# Patient Record
Sex: MALE | Race: WHITE | NOT HISPANIC OR LATINO | URBAN - METROPOLITAN AREA
[De-identification: names, ages, dates, MRNs, and addresses within clinical notes are randomized per-mention and may not be internally consistent; named-entity substitution may affect disease eponyms.]

---

## 2016-01-27 RX ORDER — LOSARTAN POTASSIUM 100 MG/1
2 TABLET, FILM COATED ORAL
Qty: 0 | Refills: 0 | COMMUNITY
Start: 2016-01-27

## 2017-01-12 ENCOUNTER — EMERGENCY (EMERGENCY)
Facility: HOSPITAL | Age: 67
LOS: 1 days | Discharge: HOME | End: 2017-01-12
Attending: EMERGENCY MEDICINE | Admitting: EMERGENCY MEDICINE
Payer: COMMERCIAL

## 2017-01-12 VITALS
HEIGHT: 63 IN | DIASTOLIC BLOOD PRESSURE: 79 MMHG | SYSTOLIC BLOOD PRESSURE: 178 MMHG | TEMPERATURE: 98 F | HEART RATE: 74 BPM | OXYGEN SATURATION: 99 % | RESPIRATION RATE: 15 BRPM | WEIGHT: 190.04 LBS
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
## 2017-01-12 DIAGNOSIS — S61.214A LACERATION WITHOUT FOREIGN BODY OF RIGHT RING FINGER WITHOUT DAMAGE TO NAIL, INITIAL ENCOUNTER: ICD-10-CM

## 2017-01-12 DIAGNOSIS — Z79.02 LONG TERM (CURRENT) USE OF ANTITHROMBOTICS/ANTIPLATELETS: ICD-10-CM

## 2017-01-12 DIAGNOSIS — I25.10 ATHEROSCLEROTIC HEART DISEASE OF NATIVE CORONARY ARTERY WITHOUT ANGINA PECTORIS: ICD-10-CM

## 2017-01-12 DIAGNOSIS — I10 ESSENTIAL (PRIMARY) HYPERTENSION: ICD-10-CM

## 2017-01-12 DIAGNOSIS — E78.5 HYPERLIPIDEMIA, UNSPECIFIED: ICD-10-CM

## 2017-01-12 DIAGNOSIS — Z79.82 LONG TERM (CURRENT) USE OF ASPIRIN: ICD-10-CM
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
## 2017-01-12 PROCEDURE — 12001 RPR S/N/AX/GEN/TRNK 2.5CM/<: CPT

## 2017-01-12 PROCEDURE — 90715 TDAP VACCINE 7 YRS/> IM: CPT

## 2017-01-12 PROCEDURE — 99283 EMERGENCY DEPT VISIT LOW MDM: CPT | Mod: 25

## 2017-01-12 PROCEDURE — 90471 IMMUNIZATION ADMIN: CPT

## 2017-01-12 RX ORDER — TETANUS TOXOID, REDUCED DIPHTHERIA TOXOID AND ACELLULAR PERTUSSIS VACCINE, ADSORBED 5; 2.5; 8; 8; 2.5 [IU]/.5ML; [IU]/.5ML; UG/.5ML; UG/.5ML; UG/.5ML
0.5 SUSPENSION INTRAMUSCULAR ONCE
Qty: 0 | Refills: 0 | Status: COMPLETED | OUTPATIENT
Start: 2017-01-12 | End: 2017-01-12

## 2017-01-12 RX ADMIN — TETANUS TOXOID, REDUCED DIPHTHERIA TOXOID AND ACELLULAR PERTUSSIS VACCINE, ADSORBED 0.5 MILLILITER(S): 5; 2.5; 8; 8; 2.5 SUSPENSION INTRAMUSCULAR at 22:28

## 2017-01-12 NOTE — ED PROVIDER NOTE - OBJECTIVE STATEMENT
66 male presents to [facility] while at work today at 10am he cut his right 4th digit with a razor blade, noted bleeding, cleaned it out, co-worker applied bandages to control the bleeding.

## 2017-01-25 ENCOUNTER — EMERGENCY (EMERGENCY)
Facility: HOSPITAL | Age: 67
LOS: 1 days | Discharge: HOME | End: 2017-01-25
Attending: EMERGENCY MEDICINE | Admitting: EMERGENCY MEDICINE
Payer: COMMERCIAL

## 2017-01-25 VITALS
SYSTOLIC BLOOD PRESSURE: 148 MMHG | TEMPERATURE: 98 F | DIASTOLIC BLOOD PRESSURE: 82 MMHG | HEART RATE: 55 BPM | RESPIRATION RATE: 12 BRPM | WEIGHT: 179.9 LBS | OXYGEN SATURATION: 99 %

## 2017-01-25 DIAGNOSIS — Z79.02 LONG TERM (CURRENT) USE OF ANTITHROMBOTICS/ANTIPLATELETS: ICD-10-CM

## 2017-01-25 DIAGNOSIS — I10 ESSENTIAL (PRIMARY) HYPERTENSION: ICD-10-CM

## 2017-01-25 DIAGNOSIS — Z48.02 ENCOUNTER FOR REMOVAL OF SUTURES: ICD-10-CM

## 2017-01-25 DIAGNOSIS — Z79.82 LONG TERM (CURRENT) USE OF ASPIRIN: ICD-10-CM

## 2017-01-25 DIAGNOSIS — E78.5 HYPERLIPIDEMIA, UNSPECIFIED: ICD-10-CM

## 2017-01-25 DIAGNOSIS — I25.10 ATHEROSCLEROTIC HEART DISEASE OF NATIVE CORONARY ARTERY WITHOUT ANGINA PECTORIS: ICD-10-CM

## 2017-01-25 PROCEDURE — G0463: CPT

## 2017-01-25 NOTE — ED PROCEDURE NOTE - CPROC ED POST PROC CARE GUIDE1
Keep the cast/splint/dressing clean and dry./Verbal/written post procedure instructions were given to patient/caregiver./Instructed patient/caregiver regarding signs and symptoms of infection./Instructed patient/caregiver to follow-up with primary care physician.

## 2017-01-25 NOTE — ED PROVIDER NOTE - PHYSICAL EXAMINATION
R 4th finger:  2.5cm well approximated laceration with sutures in place.  No erythema, TTP, d/c, or other signs of infection.  FROM, motor 5/5, sensation intact, CR<2secs.

## 2017-09-11 NOTE — ED PROVIDER NOTE - CONTEXT
Subjective   HPI Comments: 54-year-old white male with known history of rectal cancer followed by Dr. Benjamin complaining of abdominal pain , vomiting and no bowel movement for 2 days.  He notified his doctor but he was told to come to the emergency department.  He denies fever, chills, urinary complaints, or other complaints.    Patient is a 54 y.o. male presenting with abdominal pain.   History provided by:  Patient  Abdominal Pain   Pain location:  Generalized  Pain quality: dull    Pain radiates to:  Does not radiate  Pain severity:  Moderate  Onset quality:  Gradual  Timing:  Constant  Progression:  Worsening  Chronicity:  Recurrent  Relieved by:  Nothing  Worsened by:  Nothing  Ineffective treatments:  None tried  Associated symptoms: no anorexia, no cough, no fever, no hematemesis, no hematochezia, no hematuria, no nausea and no shortness of breath        Review of Systems   Constitutional: Negative for fever.   Respiratory: Negative for cough and shortness of breath.    Gastrointestinal: Positive for abdominal pain. Negative for anorexia, hematemesis, hematochezia and nausea.   Genitourinary: Negative for hematuria.   All other systems reviewed and are negative.      Past Medical History:   Diagnosis Date   • Degenerative disc disease, lumbar    • Rectal cancer        Allergies   Allergen Reactions   • Codeine Itching       Past Surgical History:   Procedure Laterality Date   • COLONOSCOPY  2016   • HEMORROIDECTOMY  2014   • LUMBAR DISC SURGERY  2006    L2-L5   • PILONIDAL CYSTECTOMY  2015   • UMBILICAL HERNIA REPAIR  2014   • UPPER GASTROINTESTINAL ENDOSCOPY  2016       Family History   Problem Relation Age of Onset   • Stroke Father    • Throat cancer Father        Social History     Social History   • Marital status: Single     Spouse name: N/A   • Number of children: 0   • Years of education: H.S.     Occupational History   • [email or web address]     Social History Main Topics   • Smoking status: Former  Smoker     Packs/day: 1.00     Years: 20.00     Types: Cigarettes     Quit date: 6/25/2015   • Smokeless tobacco: Never Used   • Alcohol use No   • Drug use: No   • Sexual activity: Yes     Partners: Male     Other Topics Concern   • None     Social History Narrative   • None           Objective   Physical Exam   Constitutional: He appears well-developed and well-nourished.   HENT:   Head: Normocephalic and atraumatic.   Eyes: Conjunctivae are normal.   Neck: Normal range of motion. Neck supple.   Cardiovascular: Normal rate, regular rhythm and normal heart sounds.    Pulmonary/Chest: Effort normal and breath sounds normal.   Abdominal: Soft. There is tenderness. There is guarding.   Patient with generalized tenderness and absence of bowel sounds.  Patient has guarding in the mid abdomen.   Musculoskeletal: Normal range of motion.   Neurological: He is alert.   Skin: Skin is warm and dry.   Psychiatric: He has a normal mood and affect. His behavior is normal. Judgment and thought content normal.   Nursing note and vitals reviewed.      Procedures         ED Course  ED Course   Comment By Time   I spoke with Dr. Benjamin by phone.  H&P and results were given.  He recommended the patient calling the office for further instructions. JOSEPH Ontiveros 09/11 1510          Recent Results (from the past 24 hour(s))   Comprehensive Metabolic Panel    Collection Time: 09/11/17 11:22 AM   Result Value Ref Range    Glucose 80 70 - 100 mg/dL    BUN 10 9 - 23 mg/dL    Creatinine 1.00 0.60 - 1.30 mg/dL    Sodium 144 132 - 146 mmol/L    Potassium 4.2 3.5 - 5.5 mmol/L    Chloride 109 99 - 109 mmol/L    CO2 30.0 20.0 - 31.0 mmol/L    Calcium 9.7 8.7 - 10.4 mg/dL    Total Protein 7.3 5.7 - 8.2 g/dL    Albumin 4.60 3.20 - 4.80 g/dL    ALT (SGPT) 10 7 - 40 U/L    AST (SGOT) 13 0 - 33 U/L    Alkaline Phosphatase 90 25 - 100 U/L    Total Bilirubin 0.3 0.3 - 1.2 mg/dL    eGFR Non African Amer 78 >60 mL/min/1.73    Globulin 2.7 gm/dL    A/G  Ratio 1.7 1.5 - 2.5 g/dL    BUN/Creatinine Ratio 10.0 7.0 - 25.0    Anion Gap 5.0 3.0 - 11.0 mmol/L   Amylase    Collection Time: 09/11/17 11:22 AM   Result Value Ref Range    Amylase 66 30 - 118 U/L   Lipase    Collection Time: 09/11/17 11:22 AM   Result Value Ref Range    Lipase 118 (H) 6 - 51 U/L   Lactic Acid, Plasma    Collection Time: 09/11/17 11:22 AM   Result Value Ref Range    Lactate 0.6 0.5 - 2.0 mmol/L   CBC Auto Differential    Collection Time: 09/11/17 11:22 AM   Result Value Ref Range    WBC 4.90 3.50 - 10.80 10*3/mm3    RBC 4.37 4.20 - 5.76 10*6/mm3    Hemoglobin 13.6 13.1 - 17.5 g/dL    Hematocrit 38.9 38.9 - 50.9 %    MCV 89.0 80.0 - 99.0 fL    MCH 31.1 (H) 27.0 - 31.0 pg    MCHC 35.0 32.0 - 36.0 g/dL    RDW 13.2 11.3 - 14.5 %    RDW-SD 43.0 37.0 - 54.0 fl    MPV 10.3 6.0 - 12.0 fL    Platelets 161 150 - 450 10*3/mm3    Neutrophil % 62.9 41.0 - 71.0 %    Lymphocyte % 25.7 24.0 - 44.0 %    Monocyte % 9.2 0.0 - 12.0 %    Eosinophil % 1.4 0.0 - 3.0 %    Basophil % 0.6 0.0 - 1.0 %    Immature Grans % 0.2 0.0 - 0.6 %    Neutrophils, Absolute 3.08 1.50 - 8.30 10*3/mm3    Lymphocytes, Absolute 1.26 0.60 - 4.80 10*3/mm3    Monocytes, Absolute 0.45 0.00 - 1.00 10*3/mm3    Eosinophils, Absolute 0.07 0.00 - 0.30 10*3/mm3    Basophils, Absolute 0.03 0.00 - 0.20 10*3/mm3    Immature Grans, Absolute 0.01 0.00 - 0.03 10*3/mm3   Urinalysis With / Culture If Indicated    Collection Time: 09/11/17 11:32 AM   Result Value Ref Range    Color, UA Yellow Yellow, Straw    Appearance, UA Clear Clear    pH, UA 6.0 5.0 - 8.0    Specific Gravity, UA 1.006 1.001 - 1.030    Glucose, UA Negative Negative    Ketones, UA Negative Negative    Bilirubin, UA Negative Negative    Blood, UA Moderate (2+) (A) Negative    Protein, UA Negative Negative    Leuk Esterase, UA Negative Negative    Nitrite, UA Negative Negative    Urobilinogen, UA 0.2 E.U./dL 0.2 - 1.0 E.U./dL   Urinalysis, Microscopic Only    Collection Time: 09/11/17 11:32  AM   Result Value Ref Range    RBC, UA 3-6 (A) None Seen, 0-2 /HPF    WBC, UA None Seen None Seen /HPF    Bacteria, UA None Seen None Seen, Trace /HPF    Squamous Epithelial Cells, UA None Seen None Seen, 0-2 /HPF    Hyaline Casts, UA None Seen 0 - 6 /LPF    Methodology Automated Microscopy    POCT Occult Blood, stool    Collection Time: 09/11/17 12:10 PM   Result Value Ref Range    Fecal Occult Blood Negative Negative    Lot Number [id number]     Expiration Date 3-20     DEVELOPER LOT NUMBER [id number]     DEVELOPER EXPIRATION DATE 5-20     Positive Control Positive Positive    Negative Control Negative Negative     Note: In addition to lab results from this visit, the labs listed above may include labs taken at another facility or during a different encounter within the last 24 hours. Please correlate lab times with ED admission and discharge times for further clarification of the services performed during this visit.    CT Abdomen Pelvis With Contrast   Preliminary Result   No acute intra-abdominal or pelvic abnormality.       D:  09/11/2017   E:  09/11/2017            Vitals:    09/11/17 1330 09/11/17 1351 09/11/17 1430 09/11/17 1445   BP:  112/86 145/79 152/77   BP Location:  Right arm Left arm Left arm   Patient Position:  Lying Lying Lying   Pulse:  50 (!) 46 (!) 46   Resp:       Temp:       TempSrc:       SpO2: 97% 94% 96% 98%   Weight:       Height:         Medications   HYDROmorphone (DILAUDID) injection 1 mg (1 mg Intravenous Given 9/11/17 1135)   ondansetron (ZOFRAN) injection 4 mg (4 mg Intravenous Given 9/11/17 1135)   sodium chloride 0.9 % bolus 1,000 mL (0 mL Intravenous Stopped 9/11/17 1345)   iopamidol (ISOVUE-300) 61 % injection 100 mL (98 mL Intravenous Given 9/11/17 1308)     ECG/EMG Results (last 24 hours)     Procedure Component Value Units Date/Time    ECG 12 Lead [[id number]] Collected:  09/11/17 1121     Updated:  09/11/17 1127    Narrative:       Test Reason : abd pain  Blood Pressure : **/**  mmHG  Vent. Rate : 059 BPM     Atrial Rate : 059 BPM     P-R Int : 164 ms          QRS Dur : 084 ms      QT Int : 400 ms       P-R-T Axes : 057 031 041 degrees     QTc Int : 396 ms    Sinus bradycardia  Otherwise normal ECG  When compared with ECG of 27-AUG-2017 02:26,  fusion complexes are no longer present  premature ventricular complexes are no longer present  premature supraventricular complexes are no longer present  QT has shortened  Confirmed by NINO SCHMIDT MD ([id number]) on 9/11/2017 11:27:09 AM    Referred By:  GRACIE IRELAND           Confirmed By:NINO SCHMIDT MD              [facility]    Final diagnoses:   Abdominal pain, unspecified location            JOSEPH Ontiveros  09/11/17 8905     known (describe)/work related

## 2017-11-04 ENCOUNTER — EMERGENCY (EMERGENCY)
Facility: HOSPITAL | Age: 67
LOS: 1 days | Discharge: HOME | End: 2017-11-04
Attending: INTERNAL MEDICINE | Admitting: INTERNAL MEDICINE
Payer: COMMERCIAL

## 2017-11-04 VITALS
OXYGEN SATURATION: 97 % | WEIGHT: 182.98 LBS | DIASTOLIC BLOOD PRESSURE: 96 MMHG | HEART RATE: 73 BPM | HEIGHT: 65 IN | SYSTOLIC BLOOD PRESSURE: 202 MMHG | RESPIRATION RATE: 96 BRPM | TEMPERATURE: 98 F

## 2017-11-04 VITALS
OXYGEN SATURATION: 97 % | SYSTOLIC BLOOD PRESSURE: 152 MMHG | TEMPERATURE: 98 F | DIASTOLIC BLOOD PRESSURE: 93 MMHG | HEART RATE: 76 BPM | RESPIRATION RATE: 18 BRPM

## 2017-11-04 DIAGNOSIS — Z95.5 PRESENCE OF CORONARY ANGIOPLASTY IMPLANT AND GRAFT: ICD-10-CM

## 2017-11-04 DIAGNOSIS — E78.5 HYPERLIPIDEMIA, UNSPECIFIED: ICD-10-CM

## 2017-11-04 DIAGNOSIS — R10.32 LEFT LOWER QUADRANT PAIN: ICD-10-CM

## 2017-11-04 DIAGNOSIS — I10 ESSENTIAL (PRIMARY) HYPERTENSION: ICD-10-CM

## 2017-11-04 DIAGNOSIS — I25.10 ATHEROSCLEROTIC HEART DISEASE OF NATIVE CORONARY ARTERY WITHOUT ANGINA PECTORIS: ICD-10-CM

## 2017-11-04 DIAGNOSIS — K40.90 UNILATERAL INGUINAL HERNIA, WITHOUT OBSTRUCTION OR GANGRENE, NOT SPECIFIED AS RECURRENT: ICD-10-CM

## 2017-11-04 LAB
ALBUMIN SERPL ELPH-MCNC: 4.1 G/DL — SIGNIFICANT CHANGE UP (ref 3.3–5)
ALP SERPL-CCNC: 133 U/L — HIGH (ref 40–120)
ALT FLD-CCNC: 41 U/L — SIGNIFICANT CHANGE UP (ref 12–78)
ANION GAP SERPL CALC-SCNC: 10 MMOL/L — SIGNIFICANT CHANGE UP (ref 5–17)
AST SERPL-CCNC: 23 U/L — SIGNIFICANT CHANGE UP (ref 15–37)
BILIRUB SERPL-MCNC: 0.5 MG/DL — SIGNIFICANT CHANGE UP (ref 0.2–1.2)
BLD GP AB SCN SERPL QL: SIGNIFICANT CHANGE UP
BUN SERPL-MCNC: 28 MG/DL — HIGH (ref 7–23)
CALCIUM SERPL-MCNC: 8.8 MG/DL — SIGNIFICANT CHANGE UP (ref 8.5–10.1)
CHLORIDE SERPL-SCNC: 105 MMOL/L — SIGNIFICANT CHANGE UP (ref 96–108)
CO2 SERPL-SCNC: 24 MMOL/L — SIGNIFICANT CHANGE UP (ref 22–31)
CREAT SERPL-MCNC: 1.5 MG/DL — HIGH (ref 0.5–1.3)
GLUCOSE SERPL-MCNC: 143 MG/DL — HIGH (ref 70–99)
HCT VFR BLD CALC: 42.3 % — SIGNIFICANT CHANGE UP (ref 39–50)
HGB BLD-MCNC: 13.5 G/DL — SIGNIFICANT CHANGE UP (ref 13–17)
MCHC RBC-ENTMCNC: 23.6 PG — LOW (ref 27–34)
MCHC RBC-ENTMCNC: 32 GM/DL — SIGNIFICANT CHANGE UP (ref 32–36)
MCV RBC AUTO: 73.7 FL — LOW (ref 80–100)
PLATELET # BLD AUTO: 226 K/UL — SIGNIFICANT CHANGE UP (ref 150–400)
POTASSIUM SERPL-MCNC: 4.1 MMOL/L — SIGNIFICANT CHANGE UP (ref 3.5–5.3)
POTASSIUM SERPL-SCNC: 4.1 MMOL/L — SIGNIFICANT CHANGE UP (ref 3.5–5.3)
PROT SERPL-MCNC: 8.1 G/DL — SIGNIFICANT CHANGE UP (ref 6–8.3)
RBC # BLD: 5.74 M/UL — SIGNIFICANT CHANGE UP (ref 4.2–5.8)
RBC # FLD: 13.9 % — SIGNIFICANT CHANGE UP (ref 10.3–14.5)
SODIUM SERPL-SCNC: 139 MMOL/L — SIGNIFICANT CHANGE UP (ref 135–145)
WBC # BLD: 10 K/UL — SIGNIFICANT CHANGE UP (ref 3.8–10.5)
WBC # FLD AUTO: 10 K/UL — SIGNIFICANT CHANGE UP (ref 3.8–10.5)

## 2017-11-04 PROCEDURE — 74176 CT ABD & PELVIS W/O CONTRAST: CPT | Mod: 26

## 2017-11-04 PROCEDURE — 99284 EMERGENCY DEPT VISIT MOD MDM: CPT

## 2017-11-04 PROCEDURE — 86850 RBC ANTIBODY SCREEN: CPT

## 2017-11-04 PROCEDURE — 86900 BLOOD TYPING SEROLOGIC ABO: CPT

## 2017-11-04 PROCEDURE — 99284 EMERGENCY DEPT VISIT MOD MDM: CPT | Mod: 25

## 2017-11-04 PROCEDURE — 80053 COMPREHEN METABOLIC PANEL: CPT

## 2017-11-04 PROCEDURE — 74176 CT ABD & PELVIS W/O CONTRAST: CPT

## 2017-11-04 PROCEDURE — 86901 BLOOD TYPING SEROLOGIC RH(D): CPT

## 2017-11-04 PROCEDURE — 85027 COMPLETE CBC AUTOMATED: CPT

## 2017-11-04 RX ORDER — MAGNESIUM HYDROXIDE 400 MG/1
30 TABLET, CHEWABLE ORAL ONCE
Qty: 0 | Refills: 0 | Status: COMPLETED | OUTPATIENT
Start: 2017-11-04 | End: 2017-11-04

## 2017-11-04 RX ADMIN — MAGNESIUM HYDROXIDE 30 MILLILITER(S): 400 TABLET, CHEWABLE ORAL at 03:46

## 2017-11-04 NOTE — ED ADULT NURSE NOTE - OBJECTIVE STATEMENT
bilateral groin pain and constipation x a few weeks, left worse then right. denies bumps/lumps and all other c/o

## 2017-11-04 NOTE — ED PROVIDER NOTE - PROGRESS NOTE DETAILS
no pain, the patient wasn't to go home and will f/u with Dr Klein in his office, CT non obstructive hernia

## 2017-11-04 NOTE — ED ADULT TRIAGE NOTE - CHIEF COMPLAINT QUOTE
bilateral groin pain, left more then the right x weeks, progressively getting worse. also reports constipation x weeks.

## 2017-11-04 NOTE — ED PROVIDER NOTE - PMH
CAD (coronary artery disease)  stents x 2  Essential hypertension    Gastroesophageal reflux disease, esophagitis presence not specified    HTN (hypertension)    Hyperlipidemia

## 2017-11-07 ENCOUNTER — APPOINTMENT (OUTPATIENT)
Dept: SURGERY | Facility: CLINIC | Age: 67
End: 2017-11-07

## 2018-03-26 ENCOUNTER — INPATIENT (INPATIENT)
Facility: HOSPITAL | Age: 68
LOS: 0 days | Discharge: HOME | DRG: 313 | End: 2018-03-27
Attending: FAMILY MEDICINE | Admitting: INTERNAL MEDICINE
Payer: COMMERCIAL

## 2018-03-26 VITALS
HEIGHT: 64 IN | OXYGEN SATURATION: 100 % | HEART RATE: 56 BPM | TEMPERATURE: 98 F | WEIGHT: 179.9 LBS | RESPIRATION RATE: 16 BRPM | SYSTOLIC BLOOD PRESSURE: 193 MMHG | DIASTOLIC BLOOD PRESSURE: 102 MMHG

## 2018-03-26 DIAGNOSIS — R07.9 CHEST PAIN, UNSPECIFIED: ICD-10-CM

## 2018-03-26 DIAGNOSIS — E78.5 HYPERLIPIDEMIA, UNSPECIFIED: ICD-10-CM

## 2018-03-26 DIAGNOSIS — N17.9 ACUTE KIDNEY FAILURE, UNSPECIFIED: ICD-10-CM

## 2018-03-26 DIAGNOSIS — I10 ESSENTIAL (PRIMARY) HYPERTENSION: ICD-10-CM

## 2018-03-26 DIAGNOSIS — I25.10 ATHEROSCLEROTIC HEART DISEASE OF NATIVE CORONARY ARTERY WITHOUT ANGINA PECTORIS: ICD-10-CM

## 2018-03-26 DIAGNOSIS — Z29.9 ENCOUNTER FOR PROPHYLACTIC MEASURES, UNSPECIFIED: ICD-10-CM

## 2018-03-26 DIAGNOSIS — K21.9 GASTRO-ESOPHAGEAL REFLUX DISEASE WITHOUT ESOPHAGITIS: ICD-10-CM

## 2018-03-26 LAB
ALBUMIN SERPL ELPH-MCNC: 4.4 G/DL — SIGNIFICANT CHANGE UP (ref 3.3–5)
ALP SERPL-CCNC: 141 U/L — HIGH (ref 40–120)
ALT FLD-CCNC: 48 U/L — SIGNIFICANT CHANGE UP (ref 12–78)
ANION GAP SERPL CALC-SCNC: 6 MMOL/L — SIGNIFICANT CHANGE UP (ref 5–17)
AST SERPL-CCNC: 22 U/L — SIGNIFICANT CHANGE UP (ref 15–37)
BASOPHILS # BLD AUTO: 0.1 K/UL — SIGNIFICANT CHANGE UP (ref 0–0.2)
BASOPHILS NFR BLD AUTO: 1.2 % — SIGNIFICANT CHANGE UP (ref 0–2)
BILIRUB SERPL-MCNC: 0.4 MG/DL — SIGNIFICANT CHANGE UP (ref 0.2–1.2)
BUN SERPL-MCNC: 32 MG/DL — HIGH (ref 7–23)
CALCIUM SERPL-MCNC: 9.2 MG/DL — SIGNIFICANT CHANGE UP (ref 8.5–10.1)
CHLORIDE SERPL-SCNC: 104 MMOL/L — SIGNIFICANT CHANGE UP (ref 96–108)
CK MB BLD-MCNC: 1.6 % — SIGNIFICANT CHANGE UP (ref 0–3.5)
CK MB CFR SERPL CALC: 1.3 NG/ML — SIGNIFICANT CHANGE UP (ref 0–3.6)
CK SERPL-CCNC: 79 U/L — SIGNIFICANT CHANGE UP (ref 26–308)
CK SERPL-CCNC: 80 U/L — SIGNIFICANT CHANGE UP (ref 26–308)
CO2 SERPL-SCNC: 26 MMOL/L — SIGNIFICANT CHANGE UP (ref 22–31)
CREAT SERPL-MCNC: 1.8 MG/DL — HIGH (ref 0.5–1.3)
EOSINOPHIL # BLD AUTO: 0.2 K/UL — SIGNIFICANT CHANGE UP (ref 0–0.5)
EOSINOPHIL NFR BLD AUTO: 2.6 % — SIGNIFICANT CHANGE UP (ref 0–6)
GLUCOSE SERPL-MCNC: 110 MG/DL — HIGH (ref 70–99)
HCT VFR BLD CALC: 45.2 % — SIGNIFICANT CHANGE UP (ref 39–50)
HGB BLD-MCNC: 14.1 G/DL — SIGNIFICANT CHANGE UP (ref 13–17)
LYMPHOCYTES # BLD AUTO: 2.1 K/UL — SIGNIFICANT CHANGE UP (ref 1–3.3)
LYMPHOCYTES # BLD AUTO: 26.6 % — SIGNIFICANT CHANGE UP (ref 13–44)
MCHC RBC-ENTMCNC: 23.3 PG — LOW (ref 27–34)
MCHC RBC-ENTMCNC: 31.2 GM/DL — LOW (ref 32–36)
MCV RBC AUTO: 74.5 FL — LOW (ref 80–100)
MONOCYTES # BLD AUTO: 0.6 K/UL — SIGNIFICANT CHANGE UP (ref 0–0.9)
MONOCYTES NFR BLD AUTO: 7.6 % — SIGNIFICANT CHANGE UP (ref 1–9)
NEUTROPHILS # BLD AUTO: 5 K/UL — SIGNIFICANT CHANGE UP (ref 1.8–7.4)
NEUTROPHILS NFR BLD AUTO: 62.1 % — SIGNIFICANT CHANGE UP (ref 43–77)
PLATELET # BLD AUTO: 218 K/UL — SIGNIFICANT CHANGE UP (ref 150–400)
POTASSIUM SERPL-MCNC: 4.5 MMOL/L — SIGNIFICANT CHANGE UP (ref 3.5–5.3)
POTASSIUM SERPL-SCNC: 4.5 MMOL/L — SIGNIFICANT CHANGE UP (ref 3.5–5.3)
PROT SERPL-MCNC: 8.4 G/DL — HIGH (ref 6–8.3)
RBC # BLD: 6.06 M/UL — HIGH (ref 4.2–5.8)
RBC # FLD: 13.6 % — SIGNIFICANT CHANGE UP (ref 10.3–14.5)
SODIUM SERPL-SCNC: 136 MMOL/L — SIGNIFICANT CHANGE UP (ref 135–145)
TROPONIN I SERPL-MCNC: <.015 NG/ML — SIGNIFICANT CHANGE UP (ref 0.01–0.04)
WBC # BLD: 8 K/UL — SIGNIFICANT CHANGE UP (ref 3.8–10.5)
WBC # FLD AUTO: 8 K/UL — SIGNIFICANT CHANGE UP (ref 3.8–10.5)

## 2018-03-26 PROCEDURE — 71046 X-RAY EXAM CHEST 2 VIEWS: CPT | Mod: 26

## 2018-03-26 PROCEDURE — 99285 EMERGENCY DEPT VISIT HI MDM: CPT

## 2018-03-26 PROCEDURE — 93010 ELECTROCARDIOGRAM REPORT: CPT

## 2018-03-26 PROCEDURE — 99223 1ST HOSP IP/OBS HIGH 75: CPT

## 2018-03-26 PROCEDURE — 99223 1ST HOSP IP/OBS HIGH 75: CPT | Mod: AI

## 2018-03-26 RX ORDER — SODIUM CHLORIDE 9 MG/ML
3 INJECTION INTRAMUSCULAR; INTRAVENOUS; SUBCUTANEOUS ONCE
Qty: 0 | Refills: 0 | Status: COMPLETED | OUTPATIENT
Start: 2018-03-26 | End: 2018-03-26

## 2018-03-26 RX ORDER — ATENOLOL 25 MG/1
50 TABLET ORAL DAILY
Qty: 0 | Refills: 0 | Status: DISCONTINUED | OUTPATIENT
Start: 2018-03-26 | End: 2018-03-26

## 2018-03-26 RX ORDER — ASPIRIN/CALCIUM CARB/MAGNESIUM 324 MG
81 TABLET ORAL DAILY
Qty: 0 | Refills: 0 | Status: DISCONTINUED | OUTPATIENT
Start: 2018-03-27 | End: 2018-03-27

## 2018-03-26 RX ORDER — ATORVASTATIN CALCIUM 80 MG/1
20 TABLET, FILM COATED ORAL AT BEDTIME
Qty: 0 | Refills: 0 | Status: DISCONTINUED | OUTPATIENT
Start: 2018-03-26 | End: 2018-03-27

## 2018-03-26 RX ORDER — FOLIC ACID 0.8 MG
1 TABLET ORAL DAILY
Qty: 0 | Refills: 0 | Status: DISCONTINUED | OUTPATIENT
Start: 2018-03-26 | End: 2018-03-27

## 2018-03-26 RX ORDER — ENOXAPARIN SODIUM 100 MG/ML
40 INJECTION SUBCUTANEOUS DAILY
Qty: 0 | Refills: 0 | Status: DISCONTINUED | OUTPATIENT
Start: 2018-03-26 | End: 2018-03-27

## 2018-03-26 RX ORDER — CLOPIDOGREL BISULFATE 75 MG/1
75 TABLET, FILM COATED ORAL DAILY
Qty: 0 | Refills: 0 | Status: DISCONTINUED | OUTPATIENT
Start: 2018-03-26 | End: 2018-03-27

## 2018-03-26 RX ORDER — ASPIRIN/CALCIUM CARB/MAGNESIUM 324 MG
81 TABLET ORAL ONCE
Qty: 0 | Refills: 0 | Status: COMPLETED | OUTPATIENT
Start: 2018-03-26 | End: 2018-03-26

## 2018-03-26 RX ORDER — PANTOPRAZOLE SODIUM 20 MG/1
40 TABLET, DELAYED RELEASE ORAL
Qty: 0 | Refills: 0 | Status: DISCONTINUED | OUTPATIENT
Start: 2018-03-26 | End: 2018-03-27

## 2018-03-26 RX ORDER — ATENOLOL 25 MG/1
50 TABLET ORAL DAILY
Qty: 0 | Refills: 0 | Status: DISCONTINUED | OUTPATIENT
Start: 2018-03-26 | End: 2018-03-27

## 2018-03-26 RX ORDER — SODIUM CHLORIDE 9 MG/ML
1000 INJECTION INTRAMUSCULAR; INTRAVENOUS; SUBCUTANEOUS ONCE
Qty: 0 | Refills: 0 | Status: COMPLETED | OUTPATIENT
Start: 2018-03-26 | End: 2018-03-26

## 2018-03-26 RX ORDER — LOSARTAN POTASSIUM 100 MG/1
25 TABLET, FILM COATED ORAL DAILY
Qty: 0 | Refills: 0 | Status: DISCONTINUED | OUTPATIENT
Start: 2018-03-26 | End: 2018-03-26

## 2018-03-26 RX ADMIN — SODIUM CHLORIDE 2000 MILLILITER(S): 9 INJECTION INTRAMUSCULAR; INTRAVENOUS; SUBCUTANEOUS at 09:02

## 2018-03-26 RX ADMIN — ENOXAPARIN SODIUM 40 MILLIGRAM(S): 100 INJECTION SUBCUTANEOUS at 12:00

## 2018-03-26 RX ADMIN — Medication 1 MILLIGRAM(S): at 17:22

## 2018-03-26 RX ADMIN — Medication 81 MILLIGRAM(S): at 07:35

## 2018-03-26 RX ADMIN — SODIUM CHLORIDE 3 MILLILITER(S): 9 INJECTION INTRAMUSCULAR; INTRAVENOUS; SUBCUTANEOUS at 07:33

## 2018-03-26 RX ADMIN — ATORVASTATIN CALCIUM 20 MILLIGRAM(S): 80 TABLET, FILM COATED ORAL at 22:06

## 2018-03-26 NOTE — H&P ADULT - NSHPREVIEWOFSYSTEMS_GEN_ALL_CORE
REVIEW OF SYSTEMS:    CONSTITUTIONAL: No weakness, fevers or chills  EYES/ENT: No visual changes;  No vertigo or throat pain   NECK: No pain or stiffness  RESPIRATORY: No cough, wheezing, hemoptysis; No shortness of breath  CARDIOVASCULAR: + chest pain, No palpitations  GASTROINTESTINAL: No abdominal or epigastric pain. No nausea, vomiting, or hematemesis; No diarrhea or constipation. No melena or hematochezia.  GENITOURINARY: No dysuria, frequency or hematuria  NEUROLOGICAL: No numbness or weakness  SKIN: No itching, burning, rashes, or lesions   All other review of systems is negative unless indicated above.

## 2018-03-26 NOTE — H&P ADULT - ASSESSMENT
68 yr old male with PMH of  HTN, GERD CAD s/p PCI ANAHI (by Dr Shaw Cardiac Cath, s/p PCI stent x 1 to Prox LAD, stent x 2 to Ramus on 1/27/2016 at [facility]) presents with atypical chest pain. Admitted to rule out ACS. Thus far ECG no acute changes, troponins negative x1, Cardiology consulted. Pt noted to be in LEOBARDO, likely iatrogenic/prerenal.

## 2018-03-26 NOTE — H&P ADULT - NSHPPHYSICALEXAM_GEN_ALL_CORE
Vital Signs Last 24 Hrs  T(C): 36.7 (26 Mar 2018 11:05), Max: 36.7 (26 Mar 2018 09:05)  T(F): 98.1 (26 Mar 2018 11:05), Max: 98.1 (26 Mar 2018 11:05)  HR: 63 (26 Mar 2018 12:03) (55 - 63)  BP: 153/73 (26 Mar 2018 11:05) (153/73 - 193/102)  BP(mean): --  RR: 16 (26 Mar 2018 11:05) (15 - 16)  SpO2: 100% (26 Mar 2018 11:05) (99% - 100%)    General: WN/WD NAD  Neurology: A&Ox3, nonfocal, FAJARDO x 4  Respiratory: CTA B/L  CV: RRR, S1S2, no murmurs, rubs or gallops  Abdominal: Soft, NT, ND +BS  Extremities: No edema, + peripheral pulses

## 2018-03-26 NOTE — H&P ADULT - PROBLEM SELECTOR PLAN 4
Continue atenolol for now, hold losartan Uncontrolled, elevated on admission now improved  Continue atenolol for now, hold losartan  Monitor VS q4h

## 2018-03-26 NOTE — CONSULT NOTE ADULT - ASSESSMENT
68 yr old male with PMH of  HTN, GERD CAD s/p PCI ANAHI (by Dr Shaw Cardiac Cath, s/p PCI stent x 1 to Prox LAD, stent x 1 to Ramus on 1/27/2016 at [facility]) presents with atypical chest pain, admitted for r/o ACS, and LEOBARDO.       - No clear evidence of acute ischemia, trops negative x 1, ECG shows no acute changes compared to previous studies  - Will f/u repeat cardiac enzymes, though pt asymptomatic  - Doubt ACS, pain likely neurogenic vs musculoskeletal.  - BP elevated but improving, continue home Atenolol, monitor routine hemodynamics  - Hold home Losartan 2/2 LEOBARDO, monitor renal indices  - monitor and replete lytes, keep K>4, Mg>2  - Pt has history of heart attack, PCI with ANAHI x 2 to LAD and ramus   - Other cardiovascular workup will depend on clinical course.  - All other workup per primary team  - Will follow 68 yr old male with PMH of  HTN, GERD CAD s/p PCI ANAHI (by Dr Shaw Cardiac Cath, s/p PCI stent x 1 to Prox LAD, stent x 1 to Ramus on 1/27/2016 at [facility]) presents with atypical chest pain, admitted for r/o ACS, and LEOBARDO.       - No clear evidence of acute ischemia, trops negative x 1, ECG shows no acute changes compared to previous studies, pt asymptomatic  - Will f/u repeat cardiac enzymes  - Doubt ACS, pain likely neurogenic vs musculoskeletal.  - Pt instructed to reschedule NM stress test for next week with Dr. Gillespie's office  - Pt will repeat blood work next week at stress test  - BP elevated but improving, continue home Atenolol and losartan, monitor routine hemodynamics and renal indices  - monitor and replete lytes, keep K>4, Mg>2  - Pt has history of heart attack, PCI with ANAHI x 2 to LAD and ramus   - Other cardiovascular workup will depend on clinical course.  - All other workup per primary team  - Will follow

## 2018-03-26 NOTE — ED ADULT NURSE NOTE - OBJECTIVE STATEMENT
PT to ED c/o chest pressure, Gerd-like ingestion x 1week, pt had stents 2016. Pt took one 81mg aspirin this morning, Denies SOB, will continue to monitor

## 2018-03-26 NOTE — H&P ADULT - PROBLEM SELECTOR PLAN 2
Continue aspirin, plavix, statin, beta blocker  Hold ARB for now given mild LEOBARDO  F/u with cardiology

## 2018-03-26 NOTE — ED PROVIDER NOTE - OBJECTIVE STATEMENT
67 y/o male with hx of CAD, HTN , s/p stent X 2 2015 p/w c/o 1 week hx of intermittent left sided chest pain. Pt described the CP as burning in nature, that radiates to left arm, lasting for few sec. Denies of any associated symptoms including N/V, sob. Denies of any fever, chills or cough. Pt states took asa 81 mg earlier this morning.

## 2018-03-26 NOTE — H&P ADULT - PROBLEM SELECTOR PLAN 3
Likely prerenal  Encourage po hydration, received IVF in ER  Monitor Cr daily  Hold losartan, patient was taking 50mg at home, consider stopping and reducing dose.

## 2018-03-26 NOTE — PATIENT PROFILE ADULT. - VISION (WITH CORRECTIVE LENSES IF THE PATIENT USUALLY WEARS THEM):
Partially impaired: cannot see medication labels or newsprint, but can see obstacles in path, and the surrounding layout; can count fingers at arm's length/also uses contacts

## 2018-03-26 NOTE — ED PROVIDER NOTE - PROGRESS NOTE DETAILS
Pt was given asa 81 mg in ED. Pt was informed about the lab and informed about the admission.    Hospitalist  was informed the admission.

## 2018-03-26 NOTE — H&P ADULT - HISTORY OF PRESENT ILLNESS
68 yr old male with PMH of  HTN, GERD CAD s/p PCI ANAHI (by Dr Shaw Cardiac Cath, s/p PCI stent x 1 to Prox LAD, stent x 2 to Ramus on 1/27/2016 at [facility]) presents with chest pain. 68 yr old male with PMH of  HTN, GERD CAD s/p PCI ANAHI (by Dr Shaw Cardiac Cath, s/p PCI stent x 1 to Prox LAD, stent x 2 to Ramus on 1/27/2016 at [facility]) presents with atypical chest pain. Pt states the pain started about 1 week ago, described as burning pain, radiates at the to the left arm, lasting seconds to a few minutes. Chest pain is not associated with breathing, movements, leaning forward or walking. He denies the pain being as severe as when he had a heart attach in 2016. He follows with Cardiologist Dr Shaw, complaint with all his medications. Was planned to do a Stress Test prior to an inguinal hernia repair he has scheduled soon.  In ED vitals stable, ECG no LAWRENCE or TWI, Tropinins neg x1. CXR no acute pathology 68 yr old male with PMH of  HTN, GERD CAD s/p PCI ANAHI (by Dr Shaw Cardiac Cath, s/p PCI stent x 1 to Prox LAD, stent x 2 to Ramus on 1/27/2016 at [facility]) presents with atypical chest pain. Pt states the pain started about 1 week ago, described as burning pain, radiates to the left arm, lasting seconds to a few minutes. Chest pain is not associated with breathing, movements, leaning forward or walking. He denies the pain being as severe as when he had a heart attack in 2016. He follows with Cardiologist Dr Shaw, complaint with all his medications. Was planned to do a Stress Test prior to an inguinal hernia repair he has scheduled soon.  In ED vitals stable, ECG no LAWRENCE or TWI, Tropinins neg x1. CXR no acute pathology 68 yr old male with PMH of  HTN, GERD CAD s/p PCI ANAHI (by Dr Shaw Cardiac Cath, s/p PCI stent x 1 to Prox LAD, stent x 2 to Ramus on 1/27/2016 at [facility]) presents with atypical chest pain. Pt states the pain started about 1 week ago, described as burning pain, radiates to the left arm, lasting seconds to a few minutes. Chest pain is not associated with breathing, movements, leaning forward or walking. He denies the pain being as severe as when he had a heart attack in 2016. He follows with Cardiologist Dr Shaw, complaint with all his medications. Was planned to do a Stress Test prior to an inguinal hernia repair he has scheduled soon.  In ED patient noted to be hypertensive 193/102, ECG no LAWRENCE or TWI, Tropinins neg x1. CXR no acute pathology

## 2018-03-26 NOTE — H&P ADULT - PROBLEM SELECTOR PLAN 1
Continue aspirin, plavix, statin, beta blocker  Will trend cardiac enzymes  Serial ECG  No evidence on exam, labs of acute coronary syndrome, to be ruled out  Cardiology consulted, appreciate recs if further cardiac work up indicated will

## 2018-03-27 ENCOUNTER — TRANSCRIPTION ENCOUNTER (OUTPATIENT)
Age: 68
End: 2018-03-27

## 2018-03-27 VITALS
TEMPERATURE: 98 F | SYSTOLIC BLOOD PRESSURE: 119 MMHG | RESPIRATION RATE: 17 BRPM | DIASTOLIC BLOOD PRESSURE: 65 MMHG | OXYGEN SATURATION: 99 % | HEART RATE: 71 BPM

## 2018-03-27 LAB
ANION GAP SERPL CALC-SCNC: 5 MMOL/L — SIGNIFICANT CHANGE UP (ref 5–17)
BUN SERPL-MCNC: 23 MG/DL — SIGNIFICANT CHANGE UP (ref 7–23)
CALCIUM SERPL-MCNC: 8.7 MG/DL — SIGNIFICANT CHANGE UP (ref 8.5–10.1)
CHLORIDE SERPL-SCNC: 107 MMOL/L — SIGNIFICANT CHANGE UP (ref 96–108)
CK SERPL-CCNC: 68 U/L — SIGNIFICANT CHANGE UP (ref 26–308)
CO2 SERPL-SCNC: 29 MMOL/L — SIGNIFICANT CHANGE UP (ref 22–31)
CREAT SERPL-MCNC: 1.6 MG/DL — HIGH (ref 0.5–1.3)
GLUCOSE SERPL-MCNC: 100 MG/DL — HIGH (ref 70–99)
HCT VFR BLD CALC: 42.8 % — SIGNIFICANT CHANGE UP (ref 39–50)
HGB BLD-MCNC: 13.1 G/DL — SIGNIFICANT CHANGE UP (ref 13–17)
MCHC RBC-ENTMCNC: 22.8 PG — LOW (ref 27–34)
MCHC RBC-ENTMCNC: 30.6 GM/DL — LOW (ref 32–36)
MCV RBC AUTO: 74.3 FL — LOW (ref 80–100)
PLATELET # BLD AUTO: 196 K/UL — SIGNIFICANT CHANGE UP (ref 150–400)
POTASSIUM SERPL-MCNC: 4.5 MMOL/L — SIGNIFICANT CHANGE UP (ref 3.5–5.3)
POTASSIUM SERPL-SCNC: 4.5 MMOL/L — SIGNIFICANT CHANGE UP (ref 3.5–5.3)
RBC # BLD: 5.76 M/UL — SIGNIFICANT CHANGE UP (ref 4.2–5.8)
RBC # FLD: 13.8 % — SIGNIFICANT CHANGE UP (ref 10.3–14.5)
SODIUM SERPL-SCNC: 141 MMOL/L — SIGNIFICANT CHANGE UP (ref 135–145)
TROPONIN I SERPL-MCNC: <.015 NG/ML — SIGNIFICANT CHANGE UP (ref 0.01–0.04)
WBC # BLD: 7 K/UL — SIGNIFICANT CHANGE UP (ref 3.8–10.5)
WBC # FLD AUTO: 7 K/UL — SIGNIFICANT CHANGE UP (ref 3.8–10.5)

## 2018-03-27 PROCEDURE — 85027 COMPLETE CBC AUTOMATED: CPT

## 2018-03-27 PROCEDURE — 99285 EMERGENCY DEPT VISIT HI MDM: CPT | Mod: 25

## 2018-03-27 PROCEDURE — 99232 SBSQ HOSP IP/OBS MODERATE 35: CPT

## 2018-03-27 PROCEDURE — 93005 ELECTROCARDIOGRAM TRACING: CPT

## 2018-03-27 PROCEDURE — 99239 HOSP IP/OBS DSCHRG MGMT >30: CPT

## 2018-03-27 PROCEDURE — 80048 BASIC METABOLIC PNL TOTAL CA: CPT

## 2018-03-27 PROCEDURE — 82553 CREATINE MB FRACTION: CPT

## 2018-03-27 PROCEDURE — 84484 ASSAY OF TROPONIN QUANT: CPT

## 2018-03-27 PROCEDURE — 82550 ASSAY OF CK (CPK): CPT

## 2018-03-27 PROCEDURE — 71046 X-RAY EXAM CHEST 2 VIEWS: CPT

## 2018-03-27 PROCEDURE — 80053 COMPREHEN METABOLIC PANEL: CPT

## 2018-03-27 PROCEDURE — 96372 THER/PROPH/DIAG INJ SC/IM: CPT

## 2018-03-27 RX ORDER — ESOMEPRAZOLE MAGNESIUM 40 MG/1
1 CAPSULE, DELAYED RELEASE ORAL
Qty: 0 | Refills: 0 | COMMUNITY

## 2018-03-27 RX ADMIN — Medication 1 MILLIGRAM(S): at 11:08

## 2018-03-27 RX ADMIN — ENOXAPARIN SODIUM 40 MILLIGRAM(S): 100 INJECTION SUBCUTANEOUS at 11:09

## 2018-03-27 RX ADMIN — PANTOPRAZOLE SODIUM 40 MILLIGRAM(S): 20 TABLET, DELAYED RELEASE ORAL at 05:02

## 2018-03-27 RX ADMIN — Medication 81 MILLIGRAM(S): at 11:09

## 2018-03-27 RX ADMIN — CLOPIDOGREL BISULFATE 75 MILLIGRAM(S): 75 TABLET, FILM COATED ORAL at 11:08

## 2018-03-27 NOTE — PROGRESS NOTE ADULT - SUBJECTIVE AND OBJECTIVE BOX
[facility] - Edvin Patel, Heaven, Roxi, Jose Miguel, Caroline Carney  Office Number:  [phone]    68 yr old male with PMH of  HTN, GERD CAD s/p PCI ANAHI (by Dr Shaw Cardiac Cath, s/p PCI stent x 1 to Prox LAD, stent x 1 to Ramus on 1/27/2016 at [facility]) presents with atypical chest pain. Pt states the pain started about 1 week ago, described as intermittent burning pain, which radiates to left elbow, lasting seconds. Chest pain is not associated with breathing, movements, leaning forward or walking. He denies the pain being as severe as when he had a heart attack in 2016. He follows with Cardiologist Dr Shaw, and is complaint with all his medications. Patient had planned to do a Stress Test prior to an inguinal hernia repair he has scheduled. In ED patient noted to be hypertensive 193/102, ECG no LAWRENCE or TWI, Tropinins neg x1. CXR no acute pathology       Patient resting comfortably in bed in NAD.  Laying flat with no respiratory distress.  No complaints of chest pain, dyspnea, palpitations, PND, or orthopnea.    Telemetry:  Bradycardic this AM    MEDICATIONS  (STANDING):  aspirin enteric coated 81 milliGRAM(s) Oral daily  ATENolol  Tablet 50 milliGRAM(s) Oral daily  atorvastatin 20 milliGRAM(s) Oral at bedtime  clopidogrel Tablet 75 milliGRAM(s) Oral daily  enoxaparin Injectable 40 milliGRAM(s) SubCutaneous daily  folic acid 1 milliGRAM(s) Oral daily  pantoprazole    Tablet 40 milliGRAM(s) Oral before breakfast    MEDICATIONS  (PRN):      Allergies    No Known Allergies    Intolerances        Vital Signs Last 24 Hrs  T(C): 36.6 (27 Mar 2018 04:45), Max: 36.7 (26 Mar 2018 09:05)  T(F): 97.8 (27 Mar 2018 04:45), Max: 98.1 (26 Mar 2018 11:05)  HR: 51 (27 Mar 2018 07:30) (48 - 63)  BP: 129/74 (27 Mar 2018 04:45) (129/74 - 170/84)  BP(mean): --  RR: 16 (27 Mar 2018 04:45) (15 - 17)  SpO2: 98% (27 Mar 2018 04:45) (98% - 100%)    I&O's Summary      ON EXAM:    General: NAD, awake and alert, oriented x 3  HEENT: Mucous membranes are moist, anicteric  Lungs: Non-labored, breath sounds are clear bilaterally, No wheezing, rales or rhonchi  Cardiovascular: Regular, S1 and S2, no murmurs, rubs, or gallops  Gastrointestinal: Bowel Sounds present, soft, nontender.   Lymph: No peripheral edema. No lymphadenopathy.  Skin: No rashes or ulcers  Psych:  Mood & affect appropriate    LABS: All Labs Reviewed:                        13.1   7.0   )-----------( 196      ( 27 Mar 2018 06:08 )             42.8                         14.1   8.0   )-----------( 218      ( 26 Mar 2018 07:31 )             45.2     27 Mar 2018 06:08    141    |  107    |  23     ----------------------------<  100    4.5     |  29     |  1.60   26 Mar 2018 07:31    136    |  104    |  32     ----------------------------<  110    4.5     |  26     |  1.80     Ca    8.7        27 Mar 2018 06:08  Ca    9.2        26 Mar 2018 07:31    TPro  8.4    /  Alb  4.4    /  TBili  0.4    /  DBili  x      /  AST  22     /  ALT  48     /  AlkPhos  141    26 Mar 2018 07:31      CARDIAC MARKERS ( 27 Mar 2018 06:08 )  <.015 ng/mL / x     / x     / x     / x      CARDIAC MARKERS ( 26 Mar 2018 22:37 )  <.015 ng/mL / x     / 80 U/L / x     / x      CARDIAC MARKERS ( 26 Mar 2018 16:01 )  <.015 ng/mL / x     / 79 U/L / x     / 1.3 ng/mL  CARDIAC MARKERS ( 26 Mar 2018 07:31 )  <.015 ng/mL / x     / x     / x     / x          Blood Culture:

## 2018-03-27 NOTE — DISCHARGE NOTE ADULT - MEDICATION SUMMARY - MEDICATIONS TO TAKE
I will START or STAY ON the medications listed below when I get home from the hospital:    aspirin 81 mg oral tablet  -- 1 tab(s) by mouth once a day  -- Indication: For CAD (coronary artery disease)    Lipitor 20 mg oral tablet  -- 1 tab(s) by mouth once a day  -- Indication: For CAD (coronary artery disease)    clopidogrel 75 mg oral tablet  -- 1 tab(s) by mouth once a day  -- Indication: For CAD (coronary artery disease)    atenolol 50 mg oral tablet  -- 1 tab(s) by mouth once a day  -- Indication: For HTN (hypertension)    esomeprazole 40 mg oral delayed release capsule  -- 1 cap(s) by mouth once a day  -- Indication: For Gerd    folic acid 1 mg oral tablet  -- 1 tab(s) by mouth once a day  -- Indication: For vitamin

## 2018-03-27 NOTE — PROGRESS NOTE ADULT - ASSESSMENT
68 yr old male with PMH of  HTN, GERD CAD s/p PCI ANAHI (by Dr Shaw Cardiac Cath, s/p PCI stent x 1 to Prox LAD, stent x 1 to Ramus on 1/27/2016 at [facility]) presents with atypical chest pain, admitted for r/o ACS, and LEOBARDO. Bradycardic overnight. Troponins negative x 4.      - No clear evidence of acute ischemia, trops negative x 4, ECG shows no acute changes compared to previous studies, pt asymptomatic  - - Pt instructed to reschedule NM stress test for next week with Dr. Gillespie's office  - Doubt ACS, pain likely neurogenic vs musculoskeletal.  - Pt will repeat blood work next week at stress test to monitor renal indices  - BP elevated but improving, continue home Atenolol and losartan, monitor routine hemodynamics and renal indices  - monitor and replete lytes, keep K>4, Mg>2  - Pt has history of heart attack, PCI with ANAHI x 2 to LAD and ramus   - Other cardiovascular workup will depend on clinical course.  - All other workup per primary team  - Will follow 68 yr old male with PMH of  HTN, GERD CAD s/p PCI ANAHI (by Dr Shaw Cardiac Cath, s/p PCI stent x 1 to Prox LAD, stent x 1 to Ramus on 1/27/2016 at [facility]) presents with atypical chest pain, admitted for r/o ACS, and LEOBARDO. Bradycardic overnight. Troponins negative x 4.      - No clear evidence of acute ischemia, trops negative x 4, ECG shows no acute changes compared to previous studies, pt asymptomatic  - - Pt instructed to reschedule NM stress test for next week with Dr. Gillespie's office  - Doubt ACS, pain likely neurogenic vs musculoskeletal.  - Pt will repeat blood work next week at stress test to monitor renal indices  - BP elevated but improving, continue home Atenolol and losartan, monitor routine hemodynamics and renal indices  - monitor and replete lytes, keep K>4, Mg>2  - Pt has history of heart attack, PCI with ANAHI x 2 to LAD and ramus   - Other cardiovascular workup will depend on clinical course.  - All other workup per primary team  - Will follow   DC planning per primary team.

## 2018-03-27 NOTE — DISCHARGE NOTE ADULT - PATIENT PORTAL LINK FT
You can access the [facility] Patient Portal, offered by [facility], by registering with the following website: [email or web address]

## 2018-03-27 NOTE — DISCHARGE NOTE ADULT - CARE PLAN
Principal Discharge DX:	Chest pain, unspecified type  Goal:	resolution of pain  Assessment and plan of treatment:	-heart attack was ruled out as a cause of your chest pain, it is likely musculoskeletal   -please follow-up with cardio-Dr. Lomas within 1 week to schedule a stress test   -follow-up with your PMD-Dr. Bustillos within 1 week  Secondary Diagnosis:	LEOBARDO (acute kidney injury)  Assessment and plan of treatment:	-your kidney function was noted to be mildly abnormal, some of this has resolved while in the hospital, however, you should follow-up with your PMD-Dr. Bustillos within 1 week for repeat blood work   -one of your blood pressure medications, Losartan, was held during your hospital stay as it may make your kidney function worse. Again, please follow-up with your PMD-Dr. Bustillos within 1 week to see if you need to be restarted on this medication  Secondary Diagnosis:	CAD (coronary artery disease)  Assessment and plan of treatment:	-continue with aspirin, plavix  Secondary Diagnosis:	Hyperlipidemia  Assessment and plan of treatment:	-continue with simvastatin  Secondary Diagnosis:	HTN (hypertension)  Assessment and plan of treatment:	-continue with atenolol   -your losartan was held during your hospital stay as it may worsen your kidney function, please follow-up with your PMD-Dr. Bustillos within 1 week to see if you need to be restarted on this medication Principal Discharge DX:	Chest pain, unspecified type  Goal:	resolution of pain  Assessment and plan of treatment:	-heart attack was ruled out as a cause of your chest pain, it is likely musculoskeletal   -please follow-up with cardio-Dr. Lomas within 1 week to schedule a stress test   -follow-up with your PMD-Dr. Bustillos within 1 week  Secondary Diagnosis:	LEOBARDO (acute kidney injury)  Assessment and plan of treatment:	-your kidney function was noted to be mildly abnormal (creatinine elevated), this has improved, but still remains slightly elevated. You should follow-up with your PMD-Dr. Bustillos within 1 week for repeat blood work   -one of your blood pressure medications, Losartan, was held during your hospital stay as it may make your kidney function worse. Again, please follow-up with your PMD-Dr. Bustillos within 1 week to see if you need to be restarted on this medication  Secondary Diagnosis:	CAD (coronary artery disease)  Assessment and plan of treatment:	-continue with aspirin, Plavix  Secondary Diagnosis:	Hyperlipidemia  Assessment and plan of treatment:	-continue with simvastatin  Secondary Diagnosis:	HTN (hypertension)  Assessment and plan of treatment:	-continue with atenolol   -your losartan was held during your hospital stay as it may worsen your kidney function, please follow-up with your PMD-Dr. Bustillos within 1 week to see if you need to be restarted on this medication

## 2018-03-27 NOTE — DISCHARGE NOTE ADULT - PLAN OF CARE
-heart attack was ruled out as a cause of your chest pain, it is likely musculoskeletal   -please follow-up with cardio-Dr. Lomas within 1 week to schedule a stress test   -follow-up with your PMD-Dr. Bustillos within 1 week -your kidney function was noted to be mildly abnormal, some of this has resolved while in the hospital, however, you should follow-up with your PMD-Dr. Bustillos within 1 week for repeat blood work   -one of your blood pressure medications, Losartan, was held during your hospital stay as it may make your kidney function worse. Again, please follow-up with your PMD-Dr. Bustillos within 1 week to see if you need to be restarted on this medication -continue with aspirin, plavix -continue with simvastatin -continue with atenolol   -your losartan was held during your hospital stay as it may worsen your kidney function, please follow-up with your PMD-Dr. Bustillos within 1 week to see if you need to be restarted on this medication resolution of pain -your kidney function was noted to be mildly abnormal (creatinine elevated), this has improved, but still remains slightly elevated. You should follow-up with your PMD-Dr. Bustillos within 1 week for repeat blood work   -one of your blood pressure medications, Losartan, was held during your hospital stay as it may make your kidney function worse. Again, please follow-up with your PMD-Dr. Bustillos within 1 week to see if you need to be restarted on this medication -continue with aspirin, Plavix

## 2018-03-27 NOTE — DISCHARGE NOTE ADULT - MEDICATION SUMMARY - MEDICATIONS TO STOP TAKING
I will STOP taking the medications listed below when I get home from the hospital:    losartan 25 mg oral tablet  -- 2 tab(s) by mouth once a day

## 2018-03-27 NOTE — DISCHARGE NOTE ADULT - CARE PROVIDERS DIRECT ADDRESSES
[email or web address]

## 2018-03-27 NOTE — DISCHARGE NOTE ADULT - HOSPITAL COURSE
68 y.o. M with PMHx of  HTN, GERD CAD s/p PCI ANAHI (by Dr Shaw Cardiac Cath, s/p PCI stent x 1 to Prox LAD, stent x 2 to Ramus on 1/27/2016 at [facility]) presents with atypical chest pain, admitted to r/o ACS. ACS was ruled out with negative trops x4, no changes on EKG. Cardio-Dr. Gillespie evaluated pt, chest pain likely musculoskeletal in nature, pt to schedule outpt stress test within 1 week. Pt also noted to have LEOBARDO, pt unsure of baseline. Slightly improved with hydration. Losartan held during hospital stay. Remote tele monitoring only significant for sinus alejandra, asymptomatic. Pt pain free, stable for d/c to home. Follow-up with cardio-Dr. Gillespie within 1 week for stress test. Follow-up with PMD-Dr. Bustillos for repeat blood work to assess kidney function and need to restart Losartan. 68 y.o. M with PMHx of  HTN, GERD CAD s/p PCI ANAHI (by Dr Shaw Cardiac Cath, s/p PCI stent x 1 to Prox LAD, stent x 2 to Ramus on 1/27/2016 at [facility]) presents with atypical chest pain, admitted to r/o ACS. ACS was ruled out with negative trops x4, no changes on EKG. Cardio-Dr. Gillespie evaluated pt, chest pain likely musculoskeletal in nature, pt to schedule outpt stress test within 1 week. Pt also noted to have LEOBARDO, pt unsure of baseline. Slightly improved with hydration. Losartan held during hospital stay. Remote tele monitoring only significant for sinus alejandra, asymptomatic. Pt pain free, stable for d/c to home. Follow-up with cardio-Dr. Gillespie within 1 week for stress test. Follow-up with PMD-Dr. Bustillos for repeat blood work to assess kidney function and need to restart Losartan.     Patient seen and examined on day of discharge, in NAD, resting comfortably  Vital Signs  T(F): 97.8  HR: 51  BP: 129/74   RR: 16  SpO2: 98%    Physical Exam  General: WN/WD NAD  	Neurology: A&Ox3, nonfocal, FAJARDO x 4  	Respiratory: CTA B/L  	CV: RRR, S1S2, no murmurs, rubs or gallops  	Abdominal: Soft, NT, ND +BS  Extremities: No edema, + peripheral pulses 68 y.o. M with PMHx of  HTN, GERD CAD s/p PCI ANAHI (by Dr Shaw Cardiac Cath, s/p PCI stent x 1 to Prox LAD, stent x 2 to Ramus on 1/27/2016 at [facility]) presents with atypical chest pain, admitted to r/o ACS. ACS was ruled out with negative trops x4, no changes on EKG. Cardio-Dr. Gillespie evaluated pt, chest pain likely musculoskeletal in nature, pt to schedule outpt stress test within 1 week. Pt also noted to have LEOBARDO, pt unsure of baseline. Slightly improved with hydration. Losartan held during hospital stay. Remote tele monitoring only significant for sinus alejandra, asymptomatic. Pt pain free, stable for d/c to home. Follow-up with cardio-Dr. Gillespie within 1 week for stress test. Follow-up with PMD-Dr. Bustillos for repeat blood work to assess kidney function and need to restart Losartan.     Patient seen and examined on day of discharge, in NAD, resting comfortably  Vital Signs  T(F): 97.8  HR: 51  BP: 129/74   RR: 16  SpO2: 98%    Physical Exam  General: WN/WD NAD  HEENT: NC/AT, EOMI, neck supple, MMM	  Neurology: A&Ox3, nonfocal, FAJARDO x 4  Respiratory: CTA B/L  CV: RRR, S1S2, no murmurs, rubs or gallops  Abdominal: Soft, NT, ND +BS  Extremities: No edema, + peripheral pulses    Patient stable for discharge home. He will follow up with his PCP and Cardiology.    PCP will be notified--dc to be faxed to him.    Time spent: 40 mins.

## 2018-03-27 NOTE — PROGRESS NOTE ADULT - ATTENDING COMMENTS
I saw and examined the patient personally. I reviewed the above findings.  I agree with the above history, physical, and plan which I have reviewed and edited where appropriate.

## 2018-03-27 NOTE — DISCHARGE NOTE ADULT - CARE PROVIDER_API CALL
Ezekiel Gillespie), Internal Medicine  [address] [place] [address]  Phone: [phone]  Fax: [phone]    Jaskaran Bustillos), Family Medicine  [address] [place] [address]  Phone: [phone]  Fax: [phone]

## 2018-04-20 NOTE — ED ADULT TRIAGE NOTE - NSWEIGHTCALCTOOLDRUG_GEN_A_CORE
· POA, noted on CT scan  Follows Dr Shu Norris  No fever or leukocytosis noted  No signs of sepsis     · Given concerning symptoms that may or may not have been related to antibiotics will hold for now   · Would consider restarting antibiotics if she spikes a temp  · Consult gastroenterology   · Clear liquid diet   · Supportive care   · Bentyl  used

## 2018-04-26 ENCOUNTER — NON-APPOINTMENT (OUTPATIENT)
Age: 68
End: 2018-04-26

## 2018-04-26 ENCOUNTER — APPOINTMENT (OUTPATIENT)
Dept: CARDIOLOGY | Facility: CLINIC | Age: 68
End: 2018-04-26
Payer: COMMERCIAL

## 2018-04-26 VITALS
BODY MASS INDEX: 26.52 KG/M2 | SYSTOLIC BLOOD PRESSURE: 174 MMHG | DIASTOLIC BLOOD PRESSURE: 96 MMHG | HEIGHT: 68 IN | OXYGEN SATURATION: 100 % | HEART RATE: 62 BPM | WEIGHT: 175 LBS

## 2018-04-26 PROCEDURE — 99215 OFFICE O/P EST HI 40 MIN: CPT

## 2018-04-26 PROCEDURE — 93000 ELECTROCARDIOGRAM COMPLETE: CPT

## 2018-06-07 ENCOUNTER — NON-APPOINTMENT (OUTPATIENT)
Age: 68
End: 2018-06-07

## 2018-06-07 ENCOUNTER — APPOINTMENT (OUTPATIENT)
Dept: CARDIOLOGY | Facility: CLINIC | Age: 68
End: 2018-06-07
Payer: COMMERCIAL

## 2018-06-07 VITALS
HEART RATE: 55 BPM | SYSTOLIC BLOOD PRESSURE: 152 MMHG | WEIGHT: 175 LBS | HEIGHT: 68 IN | OXYGEN SATURATION: 99 % | BODY MASS INDEX: 26.52 KG/M2 | DIASTOLIC BLOOD PRESSURE: 82 MMHG

## 2018-06-07 PROCEDURE — 93000 ELECTROCARDIOGRAM COMPLETE: CPT

## 2018-06-07 PROCEDURE — 99215 OFFICE O/P EST HI 40 MIN: CPT

## 2018-06-25 ENCOUNTER — RX RENEWAL (OUTPATIENT)
Age: 68
End: 2018-06-25

## 2018-07-14 ENCOUNTER — APPOINTMENT (OUTPATIENT)
Dept: CARDIOLOGY | Facility: CLINIC | Age: 68
End: 2018-07-14
Payer: COMMERCIAL

## 2018-07-14 PROCEDURE — 93306 TTE W/DOPPLER COMPLETE: CPT

## 2018-07-20 ENCOUNTER — APPOINTMENT (OUTPATIENT)
Dept: CARDIOLOGY | Facility: CLINIC | Age: 68
End: 2018-07-20
Payer: COMMERCIAL

## 2018-07-20 VITALS
HEART RATE: 57 BPM | OXYGEN SATURATION: 98 % | WEIGHT: 174 LBS | SYSTOLIC BLOOD PRESSURE: 126 MMHG | BODY MASS INDEX: 26.37 KG/M2 | HEIGHT: 68 IN | DIASTOLIC BLOOD PRESSURE: 78 MMHG

## 2018-07-20 PROCEDURE — 93000 ELECTROCARDIOGRAM COMPLETE: CPT

## 2018-07-20 PROCEDURE — 99215 OFFICE O/P EST HI 40 MIN: CPT

## 2018-07-20 RX ORDER — AMLODIPINE BESYLATE 5 MG/1
5 TABLET ORAL
Qty: 30 | Refills: 1 | Status: DISCONTINUED | COMMUNITY
Start: 2018-06-25 | End: 2018-07-20

## 2018-11-12 ENCOUNTER — RX RENEWAL (OUTPATIENT)
Age: 68
End: 2018-11-12

## 2018-12-31 ENCOUNTER — RX RENEWAL (OUTPATIENT)
Age: 68
End: 2018-12-31

## 2019-01-11 ENCOUNTER — APPOINTMENT (OUTPATIENT)
Dept: CARDIOLOGY | Facility: CLINIC | Age: 69
End: 2019-01-11
Payer: COMMERCIAL

## 2019-01-11 PROCEDURE — 93015 CV STRESS TEST SUPVJ I&R: CPT

## 2019-01-11 PROCEDURE — A9500: CPT

## 2019-01-11 PROCEDURE — 78452 HT MUSCLE IMAGE SPECT MULT: CPT

## 2019-03-12 ENCOUNTER — MEDICATION RENEWAL (OUTPATIENT)
Age: 69
End: 2019-03-12

## 2019-03-27 ENCOUNTER — EMERGENCY (EMERGENCY)
Facility: HOSPITAL | Age: 69
LOS: 1 days | Discharge: HOME | End: 2019-03-27
Attending: EMERGENCY MEDICINE | Admitting: EMERGENCY MEDICINE
Payer: COMMERCIAL

## 2019-03-27 VITALS
SYSTOLIC BLOOD PRESSURE: 134 MMHG | RESPIRATION RATE: 17 BRPM | HEART RATE: 60 BPM | OXYGEN SATURATION: 100 % | DIASTOLIC BLOOD PRESSURE: 89 MMHG | TEMPERATURE: 98 F

## 2019-03-27 VITALS
DIASTOLIC BLOOD PRESSURE: 75 MMHG | HEART RATE: 78 BPM | TEMPERATURE: 98 F | WEIGHT: 179.9 LBS | SYSTOLIC BLOOD PRESSURE: 175 MMHG | HEIGHT: 68 IN | OXYGEN SATURATION: 97 % | RESPIRATION RATE: 18 BRPM

## 2019-03-27 LAB
ALBUMIN SERPL ELPH-MCNC: 4.2 G/DL — SIGNIFICANT CHANGE UP (ref 3.3–5)
ALP SERPL-CCNC: 102 U/L — SIGNIFICANT CHANGE UP (ref 40–120)
ALT FLD-CCNC: 33 U/L — SIGNIFICANT CHANGE UP (ref 12–78)
ANION GAP SERPL CALC-SCNC: 7 MMOL/L — SIGNIFICANT CHANGE UP (ref 5–17)
APPEARANCE UR: CLEAR — SIGNIFICANT CHANGE UP
APTT BLD: 32.4 SEC — SIGNIFICANT CHANGE UP (ref 28.5–37)
AST SERPL-CCNC: 17 U/L — SIGNIFICANT CHANGE UP (ref 15–37)
BASOPHILS # BLD AUTO: 0.03 K/UL — SIGNIFICANT CHANGE UP (ref 0–0.2)
BASOPHILS NFR BLD AUTO: 0.4 % — SIGNIFICANT CHANGE UP (ref 0–2)
BILIRUB SERPL-MCNC: 0.5 MG/DL — SIGNIFICANT CHANGE UP (ref 0.2–1.2)
BILIRUB UR-MCNC: NEGATIVE — SIGNIFICANT CHANGE UP
BUN SERPL-MCNC: 22 MG/DL — SIGNIFICANT CHANGE UP (ref 7–23)
CALCIUM SERPL-MCNC: 8.8 MG/DL — SIGNIFICANT CHANGE UP (ref 8.5–10.1)
CHLORIDE SERPL-SCNC: 108 MMOL/L — SIGNIFICANT CHANGE UP (ref 96–108)
CO2 SERPL-SCNC: 27 MMOL/L — SIGNIFICANT CHANGE UP (ref 22–31)
COLOR SPEC: YELLOW — SIGNIFICANT CHANGE UP
CREAT SERPL-MCNC: 1.3 MG/DL — SIGNIFICANT CHANGE UP (ref 0.5–1.3)
DIFF PNL FLD: NEGATIVE — SIGNIFICANT CHANGE UP
EOSINOPHIL # BLD AUTO: 0.19 K/UL — SIGNIFICANT CHANGE UP (ref 0–0.5)
EOSINOPHIL NFR BLD AUTO: 2.8 % — SIGNIFICANT CHANGE UP (ref 0–6)
GLUCOSE SERPL-MCNC: 93 MG/DL — SIGNIFICANT CHANGE UP (ref 70–99)
GLUCOSE UR QL: NEGATIVE — SIGNIFICANT CHANGE UP
HCT VFR BLD CALC: 41.1 % — SIGNIFICANT CHANGE UP (ref 39–50)
HGB BLD-MCNC: 13 G/DL — SIGNIFICANT CHANGE UP (ref 13–17)
IMM GRANULOCYTES NFR BLD AUTO: 0.4 % — SIGNIFICANT CHANGE UP (ref 0–1.5)
INR BLD: 1.21 RATIO — HIGH (ref 0.88–1.16)
KETONES UR-MCNC: NEGATIVE — SIGNIFICANT CHANGE UP
LACTATE SERPL-SCNC: 0.5 MMOL/L — LOW (ref 0.7–2)
LEUKOCYTE ESTERASE UR-ACNC: NEGATIVE — SIGNIFICANT CHANGE UP
LYMPHOCYTES # BLD AUTO: 1.91 K/UL — SIGNIFICANT CHANGE UP (ref 1–3.3)
LYMPHOCYTES # BLD AUTO: 28.5 % — SIGNIFICANT CHANGE UP (ref 13–44)
MCHC RBC-ENTMCNC: 22.5 PG — LOW (ref 27–34)
MCHC RBC-ENTMCNC: 31.6 GM/DL — LOW (ref 32–36)
MCV RBC AUTO: 71.1 FL — LOW (ref 80–100)
MONOCYTES # BLD AUTO: 0.61 K/UL — SIGNIFICANT CHANGE UP (ref 0–0.9)
MONOCYTES NFR BLD AUTO: 9.1 % — SIGNIFICANT CHANGE UP (ref 2–14)
NEUTROPHILS # BLD AUTO: 3.93 K/UL — SIGNIFICANT CHANGE UP (ref 1.8–7.4)
NEUTROPHILS NFR BLD AUTO: 58.8 % — SIGNIFICANT CHANGE UP (ref 43–77)
NITRITE UR-MCNC: NEGATIVE — SIGNIFICANT CHANGE UP
NRBC # BLD: 0 /100 WBCS — SIGNIFICANT CHANGE UP (ref 0–0)
PH UR: 6.5 — SIGNIFICANT CHANGE UP (ref 5–8)
PLATELET # BLD AUTO: 290 K/UL — SIGNIFICANT CHANGE UP (ref 150–400)
POTASSIUM SERPL-MCNC: 4.2 MMOL/L — SIGNIFICANT CHANGE UP (ref 3.5–5.3)
POTASSIUM SERPL-SCNC: 4.2 MMOL/L — SIGNIFICANT CHANGE UP (ref 3.5–5.3)
PROT SERPL-MCNC: 8.1 G/DL — SIGNIFICANT CHANGE UP (ref 6–8.3)
PROT UR-MCNC: NEGATIVE — SIGNIFICANT CHANGE UP
PROTHROM AB SERPL-ACNC: 13.9 SEC — HIGH (ref 10–12.9)
RBC # BLD: 5.78 M/UL — SIGNIFICANT CHANGE UP (ref 4.2–5.8)
RBC # FLD: 15.4 % — HIGH (ref 10.3–14.5)
SODIUM SERPL-SCNC: 142 MMOL/L — SIGNIFICANT CHANGE UP (ref 135–145)
SP GR SPEC: 1.01 — SIGNIFICANT CHANGE UP (ref 1.01–1.02)
UROBILINOGEN FLD QL: NEGATIVE — SIGNIFICANT CHANGE UP
WBC # BLD: 6.7 K/UL — SIGNIFICANT CHANGE UP (ref 3.8–10.5)
WBC # FLD AUTO: 6.7 K/UL — SIGNIFICANT CHANGE UP (ref 3.8–10.5)

## 2019-03-27 PROCEDURE — 99284 EMERGENCY DEPT VISIT MOD MDM: CPT

## 2019-03-27 PROCEDURE — 99284 EMERGENCY DEPT VISIT MOD MDM: CPT | Mod: 25

## 2019-03-27 PROCEDURE — 74176 CT ABD & PELVIS W/O CONTRAST: CPT

## 2019-03-27 PROCEDURE — 83605 ASSAY OF LACTIC ACID: CPT

## 2019-03-27 PROCEDURE — 36415 COLL VENOUS BLD VENIPUNCTURE: CPT

## 2019-03-27 PROCEDURE — 74176 CT ABD & PELVIS W/O CONTRAST: CPT | Mod: 26

## 2019-03-27 PROCEDURE — 85027 COMPLETE CBC AUTOMATED: CPT

## 2019-03-27 PROCEDURE — 80053 COMPREHEN METABOLIC PANEL: CPT

## 2019-03-27 PROCEDURE — 85730 THROMBOPLASTIN TIME PARTIAL: CPT

## 2019-03-27 PROCEDURE — 85610 PROTHROMBIN TIME: CPT

## 2019-03-27 PROCEDURE — 81003 URINALYSIS AUTO W/O SCOPE: CPT

## 2019-03-27 NOTE — ED PROVIDER NOTE - NSFOLLOWUPINSTRUCTIONS_ED_ALL_ED_FT
1) Follow-up with Dr. Mills and Dr. Gillespie. Call today / next business day for prompt follow-up.  2) Return to Emergency room for any worsening or persistent pain, weakness, fever, or any other concerning symptoms.  3) See attached instruction sheets for additional information, including information regarding signs and symptoms to look out for, reasons to seek immediate care and other important instructions.

## 2019-03-27 NOTE — ED PROVIDER NOTE - CARE PROVIDER_API CALL
Ze Mills (MD)  Surgery  [address], [address]  [place] [address]  Phone: [phone]  Fax: [phone]  Follow Up Time:

## 2019-03-27 NOTE — ED PROVIDER NOTE - OBJECTIVE STATEMENT
70 yo male hx of CAD, htn, gerd, inguinal hernia c/o inguinal hernia pain x 1.5 years, seen Dr. Klein in the past, but never scheduled surgery, past few days, pain got worse.  +bulge 68 yo male hx of CAD, htn, gerd, inguinal hernia c/o inguinal hernia pain x 1.5 years, seen Dr. Klein in the past, but never scheduled surgery, past few days, pain got worse.  +bulge noted in right inguinal region with standing.  No fever/chills.  No nausea/vomiting/diarrhea.

## 2019-03-27 NOTE — ED ADULT NURSE NOTE - OBJECTIVE STATEMENT
Received pt awake alert and oriented  x3, FAJAROD. Pt presents with abdominal pain started a few days ago, as per pt he has abdominal hernia that might be related to his pain.  Denies nausea, vomiting or diarrhea. Vss, afebrile. will continue to monitor

## 2019-03-27 NOTE — ED PROVIDER NOTE - PHYSICAL EXAMINATION
Gen: Alert, NAD  Head/eyes: NC/AT, PERRL, EOMI, normal lids/conjunctiva, no scleral icterus  ENT: Bilateral TM WNL, normal hearing, patent oropharynx without erythema/exudate, uvula midline, no peritonsillar abscess, no tongue/uvula swelling  Neck: supple, no tenderness/meningismus/JVD, Trachea midline  Pulm/lung: Bilateral clear BS, normal resp effort, no wheeze/stridor/retractions  CV/heart: RRR, no M/R/G, +2 dist pulses (radial, pedal DP/PT, popliteal)  GI/Abd: soft, +mild ttp LLQ/ND, +BS, no guarding/rebound tenderness, +small inguinal hernia on left noted, no erythema, mild ttp  Musculoskeletal: no edema/erythema/cyanosis, FROM in all extremities, no C/T/L spine ttp  Skin: no rash, no vesicles, no petechaie, no ecchymosis, no swelling  Neuro: AAOx3, CN 2-12 intact, normal sensation, 5/5 motor strength in all extremities, normal gait, no dysmetria

## 2019-03-27 NOTE — ED PROVIDER NOTE - PROGRESS NOTE DETAILS
feels better, seen by Dr Mills will f/u with him in office,  patient has to f/u with Dr. Gillespie for outpatient angiocath (had +stress test 2 months ago)

## 2019-03-27 NOTE — CONSULT NOTE ADULT - ASSESSMENT
68 yo with b/l inguinal hernia, incarcerated Left. Reduced in ER. PT had +stress test and needs angiogram. PT also on plavix/asa      -cT      -will need cardiac workup and off plavix for 5 days for urgent case

## 2019-03-28 ENCOUNTER — NON-APPOINTMENT (OUTPATIENT)
Age: 69
End: 2019-03-28

## 2019-03-28 ENCOUNTER — APPOINTMENT (OUTPATIENT)
Dept: CARDIOLOGY | Facility: CLINIC | Age: 69
End: 2019-03-28
Payer: COMMERCIAL

## 2019-03-28 VITALS
OXYGEN SATURATION: 99 % | SYSTOLIC BLOOD PRESSURE: 135 MMHG | HEIGHT: 68 IN | BODY MASS INDEX: 26.07 KG/M2 | HEART RATE: 50 BPM | DIASTOLIC BLOOD PRESSURE: 79 MMHG | WEIGHT: 172 LBS

## 2019-03-28 PROCEDURE — 93000 ELECTROCARDIOGRAM COMPLETE: CPT

## 2019-03-28 PROCEDURE — 99215 OFFICE O/P EST HI 40 MIN: CPT

## 2019-04-08 ENCOUNTER — OUTPATIENT (OUTPATIENT)
Dept: OUTPATIENT SERVICES | Facility: HOSPITAL | Age: 69
LOS: 1 days | End: 2019-04-08
Payer: COMMERCIAL

## 2019-04-08 VITALS
HEIGHT: 66 IN | HEART RATE: 56 BPM | DIASTOLIC BLOOD PRESSURE: 59 MMHG | OXYGEN SATURATION: 99 % | WEIGHT: 173.06 LBS | TEMPERATURE: 98 F | SYSTOLIC BLOOD PRESSURE: 129 MMHG | RESPIRATION RATE: 16 BRPM

## 2019-04-08 DIAGNOSIS — I25.10 ATHEROSCLEROTIC HEART DISEASE OF NATIVE CORONARY ARTERY WITHOUT ANGINA PECTORIS: Chronic | ICD-10-CM

## 2019-04-08 DIAGNOSIS — K40.20 BILATERAL INGUINAL HERNIA, WITHOUT OBSTRUCTION OR GANGRENE, NOT SPECIFIED AS RECURRENT: ICD-10-CM

## 2019-04-08 DIAGNOSIS — Z01.818 ENCOUNTER FOR OTHER PREPROCEDURAL EXAMINATION: ICD-10-CM

## 2019-04-08 LAB
ANION GAP SERPL CALC-SCNC: 4 MMOL/L — LOW (ref 5–17)
APPEARANCE UR: CLEAR — SIGNIFICANT CHANGE UP
APTT BLD: 33.1 SEC — SIGNIFICANT CHANGE UP (ref 28.5–37)
BILIRUB UR-MCNC: NEGATIVE — SIGNIFICANT CHANGE UP
BUN SERPL-MCNC: 23 MG/DL — SIGNIFICANT CHANGE UP (ref 7–23)
CALCIUM SERPL-MCNC: 9 MG/DL — SIGNIFICANT CHANGE UP (ref 8.5–10.1)
CHLORIDE SERPL-SCNC: 107 MMOL/L — SIGNIFICANT CHANGE UP (ref 96–108)
CO2 SERPL-SCNC: 30 MMOL/L — SIGNIFICANT CHANGE UP (ref 22–31)
COLOR SPEC: YELLOW — SIGNIFICANT CHANGE UP
CREAT SERPL-MCNC: 1.4 MG/DL — HIGH (ref 0.5–1.3)
DIFF PNL FLD: NEGATIVE — SIGNIFICANT CHANGE UP
GLUCOSE SERPL-MCNC: 104 MG/DL — HIGH (ref 70–99)
GLUCOSE UR QL: NEGATIVE — SIGNIFICANT CHANGE UP
HCT VFR BLD CALC: 39.7 % — SIGNIFICANT CHANGE UP (ref 39–50)
HGB BLD-MCNC: 12.8 G/DL — LOW (ref 13–17)
INR BLD: 1 RATIO — SIGNIFICANT CHANGE UP (ref 0.88–1.16)
KETONES UR-MCNC: NEGATIVE — SIGNIFICANT CHANGE UP
LEUKOCYTE ESTERASE UR-ACNC: NEGATIVE — SIGNIFICANT CHANGE UP
MCHC RBC-ENTMCNC: 23.1 PG — LOW (ref 27–34)
MCHC RBC-ENTMCNC: 32.2 GM/DL — SIGNIFICANT CHANGE UP (ref 32–36)
MCV RBC AUTO: 71.8 FL — LOW (ref 80–100)
NITRITE UR-MCNC: NEGATIVE — SIGNIFICANT CHANGE UP
NRBC # BLD: 0 /100 WBCS — SIGNIFICANT CHANGE UP (ref 0–0)
PH UR: 6.5 — SIGNIFICANT CHANGE UP (ref 5–8)
PLATELET # BLD AUTO: 263 K/UL — SIGNIFICANT CHANGE UP (ref 150–400)
POTASSIUM SERPL-MCNC: 4.8 MMOL/L — SIGNIFICANT CHANGE UP (ref 3.5–5.3)
POTASSIUM SERPL-SCNC: 4.8 MMOL/L — SIGNIFICANT CHANGE UP (ref 3.5–5.3)
PROT UR-MCNC: NEGATIVE — SIGNIFICANT CHANGE UP
PROTHROM AB SERPL-ACNC: 11.4 SEC — SIGNIFICANT CHANGE UP (ref 10–12.9)
RBC # BLD: 5.53 M/UL — SIGNIFICANT CHANGE UP (ref 4.2–5.8)
RBC # FLD: 15.9 % — HIGH (ref 10.3–14.5)
SODIUM SERPL-SCNC: 141 MMOL/L — SIGNIFICANT CHANGE UP (ref 135–145)
SP GR SPEC: 1 — LOW (ref 1.01–1.02)
UROBILINOGEN FLD QL: NEGATIVE — SIGNIFICANT CHANGE UP
WBC # BLD: 5.79 K/UL — SIGNIFICANT CHANGE UP (ref 3.8–10.5)
WBC # FLD AUTO: 5.79 K/UL — SIGNIFICANT CHANGE UP (ref 3.8–10.5)

## 2019-04-08 PROCEDURE — 81003 URINALYSIS AUTO W/O SCOPE: CPT

## 2019-04-08 PROCEDURE — 93010 ELECTROCARDIOGRAM REPORT: CPT | Mod: NC

## 2019-04-08 PROCEDURE — 87086 URINE CULTURE/COLONY COUNT: CPT

## 2019-04-08 PROCEDURE — 86850 RBC ANTIBODY SCREEN: CPT

## 2019-04-08 PROCEDURE — 85730 THROMBOPLASTIN TIME PARTIAL: CPT

## 2019-04-08 PROCEDURE — 93005 ELECTROCARDIOGRAM TRACING: CPT

## 2019-04-08 PROCEDURE — 36415 COLL VENOUS BLD VENIPUNCTURE: CPT

## 2019-04-08 PROCEDURE — 86901 BLOOD TYPING SEROLOGIC RH(D): CPT

## 2019-04-08 PROCEDURE — 85027 COMPLETE CBC AUTOMATED: CPT

## 2019-04-08 PROCEDURE — 86900 BLOOD TYPING SEROLOGIC ABO: CPT

## 2019-04-08 PROCEDURE — 85610 PROTHROMBIN TIME: CPT

## 2019-04-08 PROCEDURE — 80048 BASIC METABOLIC PNL TOTAL CA: CPT

## 2019-04-08 PROCEDURE — G0463: CPT

## 2019-04-08 NOTE — H&P PST ADULT - CARDIOVASCULAR COMMENTS
h/o of stents h/o of stents 2016, not sure if it was a heart attack, went to hospital with chest pains. 4 stents

## 2019-04-08 NOTE — H&P PST ADULT - HISTORY OF PRESENT ILLNESS
70 y/o male, PMH of HTN, HLD, CAD with DE stents (not sure how many) card shows 4 form 2016. In PST with c/o of bulging in the left groin over a yr ago and c/o of lot of abdominal pain and constipation for almost a yr. Was  in the Er with abdominal pain on 3/27/19, scan showed bilateral inguinal hernia. seen by Dr Mills on 4/1/19. Now in PST scheduled for laparoscopic bilateral inguinal hernia repair. Pre op testing today. 70 y/o male, PMH of HTN, HLD, CAD with DE stents (not sure how many) card shows 4 in 2016. In PST with c/o of bulging in the left groin over a yr ago and c/o of lot of abdominal pain and constipation for almost a yr. Was  in the Er with abdominal pain on 3/27/19, scan showed bilateral inguinal hernia. seen by Dr Mills on 4/1/19. Now in PST scheduled for laparoscopic bilateral inguinal hernia repair. Pre op testing today.

## 2019-04-08 NOTE — H&P PST ADULT - ASSESSMENT
68 y/o male, PMH of HTN, HLD, CAD with DE stents (not sure how many) card shows 4 form 2016. In PST with c/o of bulging in the left groin over a yr ago and c/o of lot of abdominal pain and constipation for almost a yr. Was  in the Er with abdominal pain on 3/27/19, scan showed bilateral inguinal hernia. seen by Dr Mills on 4/1/19. Now in PST scheduled for laparoscopic bilateral inguinal hernia repair. Pre op testing today.

## 2019-04-08 NOTE — H&P PST ADULT - NSICDXPASTMEDICALHX_GEN_ALL_CORE_FT
PAST MEDICAL HISTORY:  CAD (coronary artery disease) stents x 2    Essential hypertension     Gastroesophageal reflux disease, esophagitis presence not specified     HTN (hypertension)     Hyperlipidemia

## 2019-04-08 NOTE — H&P PST ADULT - NSICDXPROBLEM_GEN_ALL_CORE_FT
PROBLEM DIAGNOSES  Problem: Bilateral inguinal hernia  Assessment and Plan: laparoscopic bilateral inguinal hernia

## 2019-04-08 NOTE — H&P PST ADULT - NSANTHOSAYNRD_GEN_A_CORE
No. HANK screening performed.  STOP BANG Legend: 0-2 = LOW Risk; 3-4 = INTERMEDIATE Risk; 5-8 = HIGH Risk

## 2019-04-08 NOTE — H&P PST ADULT - BP NONINVASIVE MEAN (MM HG)
Postpartum progress note  3/26/2017     S: Doing well, got a period of 2 hours of sleep and felt very refreshed. Pain is pretty well controlled on the oral pain medications. Lochia less than a normal period. Ambulating without difficulty. Voiding without difficulty. Passing flatus, no BM yet.  Tolerating po intake.     PHYSICAL EXAM:   Vitals:    17 0749 17 1600 17 0120 17 0943   BP: 110/61 135/73 126/70 122/70   Pulse: 81 80 74 88   Resp: 16 16 16 18   Temp: 98.1  F (36.7  C) 98.4  F (36.9  C) 97.9  F (36.6  C) 97.9  F (36.6  C)   TempSrc: Oral Oral Oral Oral   SpO2:   96%    Weight:       Height:           General: sitting up, alert and cooperative  Abd: soft, non-distended, non-tender. Fundus firm, nontender, 2 cm below umbilicus.   Incision clean/dry/intact with dermabond in place.  Extremities: calves nontender, trace edema of lower extremities bilaterally    Vitals:    17 0600   Weight: 75.1 kg (165 lb 9.6 oz)       Lab Results   Component Value Date    HGB 11.5 2017    HGB 13.1 2017    HGB 12.3 2017     Blood type: AB  Lab Results   Component Value Date    RH  Pos 2017     No results found for: RUBELLAABIGG    ASSESSMENT: 29 year old  POD 2 s/p PLTCS for breech.     PLAN:  - Heme: 13.1 > 11.5, no s/s ABLA  - Feeding: breast  - Birth control: undecided   - Rh status: pos, Rhogam not indicated   - Rubella status: immune  - Dispo: home tomorrow    Chey Gilbert MD, MPH  [facility]     84

## 2019-04-09 LAB
CULTURE RESULTS: NO GROWTH — SIGNIFICANT CHANGE UP
SPECIMEN SOURCE: SIGNIFICANT CHANGE UP

## 2019-04-14 ENCOUNTER — TRANSCRIPTION ENCOUNTER (OUTPATIENT)
Age: 69
End: 2019-04-14

## 2019-04-15 ENCOUNTER — TRANSCRIPTION ENCOUNTER (OUTPATIENT)
Age: 69
End: 2019-04-15

## 2019-04-18 ENCOUNTER — APPOINTMENT (OUTPATIENT)
Dept: CARDIOLOGY | Facility: CLINIC | Age: 69
End: 2019-04-18
Payer: COMMERCIAL

## 2019-04-18 ENCOUNTER — NON-APPOINTMENT (OUTPATIENT)
Age: 69
End: 2019-04-18

## 2019-04-18 VITALS
DIASTOLIC BLOOD PRESSURE: 74 MMHG | HEART RATE: 47 BPM | WEIGHT: 170 LBS | SYSTOLIC BLOOD PRESSURE: 131 MMHG | HEIGHT: 68 IN | BODY MASS INDEX: 25.76 KG/M2 | OXYGEN SATURATION: 99 %

## 2019-04-18 DIAGNOSIS — R07.9 CHEST PAIN, UNSPECIFIED: ICD-10-CM

## 2019-04-18 PROCEDURE — 93000 ELECTROCARDIOGRAM COMPLETE: CPT

## 2019-04-18 PROCEDURE — 99215 OFFICE O/P EST HI 40 MIN: CPT

## 2019-04-18 NOTE — PHYSICAL EXAM
[General Appearance - Well Developed] : well developed [Well Groomed] : well groomed [Normal Appearance] : normal appearance [General Appearance - Well Nourished] : well nourished [No Deformities] : no deformities [General Appearance - In No Acute Distress] : no acute distress [Normal Conjunctiva] : the conjunctiva exhibited no abnormalities [Normal Oral Mucosa] : normal oral mucosa [Normal Jugular Venous V Waves Present] : normal jugular venous V waves present [FreeTextEntry1] : No JVD [Exaggerated Use Of Accessory Muscles For Inspiration] : no accessory muscle use [Respiration, Rhythm And Depth] : normal respiratory rhythm and effort [Auscultation Breath Sounds / Voice Sounds] : lungs were clear to auscultation bilaterally [Bowel Sounds] : normal bowel sounds [Abdomen Soft] : soft [Abdomen Tenderness] : non-tender [Abnormal Walk] : normal gait [Gait - Sufficient For Exercise Testing] : the gait was sufficient for exercise testing [Cyanosis, Localized] : no localized cyanosis [Nail Clubbing] : no clubbing of the fingernails [Petechial Hemorrhages (___cm)] : no petechial hemorrhages [Skin Color & Pigmentation] : normal skin color and pigmentation [] : no rash [No Venous Stasis] : no venous stasis [Skin Lesions] : no skin lesions [Oriented To Time, Place, And Person] : oriented to person, place, and time [Affect] : the affect was normal [Mood] : the mood was normal [No Anxiety] : not feeling anxious [Not Palpable] : not palpable [No Precordial Heave] : no precordial heave was noted [Bradycardia] : bradycardic [Rhythm Regular] : regular [Normal S1] : normal S1 [No Gallop] : no gallop heard [Normal S2] : normal S2 [No Murmur] : no murmurs heard [Right Carotid Bruit] : no bruit heard over the right carotid [Left Carotid Bruit] : no bruit heard over the left carotid [2+] : left 2+ [Bruit] : no bruit heard [No Abnormalities] : the abdominal aorta was not enlarged and no bruit was heard [No Pitting Edema] : no pitting edema present

## 2019-04-18 NOTE — DISCUSSION/SUMMARY
[FreeTextEntry1] : Mr. Kat is a 69 year old man with a history of CAD s/p PCI to the proximal LAD and RI in January of 2016, hypertension and hyperlipidemia who presents to the office for follow up.  \par \par He presents today with chest pain, since the weekend. His hernia surgery has been postponed because of this. His EKG is a sinus bradycardia without obvious ischemia.\par \par Given the description of his pain, and mid to distal anterior ischemia on recent stress test, I think we have no choice but to pursue cardiac catheterization. He will restart his Plavix today, given the fact that his surgery has been cancelled. \par I have explained to him and his wife in detail that if the cardiac angiogram is positive and that he requires stents, he will need to remain on dual antiplatelet therapy without interruption for at least 6 months. His elective laparoscopic hernia repair will need to be postponed until then.\par \par He will continue taking his atenolol and amlodipine, along with a statin and statin.\par He will follow up with Dr. Gillespie after the procedure. He is to go to the ER if the pain returns.

## 2019-04-18 NOTE — HISTORY OF PRESENT ILLNESS
[FreeTextEntry1] : 69 year old man with a history of CAD s/p PCI to the proximal LAD and RI in January of 2016, hypertension and hyperlipidemia who presents to the office for follow up. \par \par In April of 2018 was having episodic, burning left sided chest pain radiating to the left elbow.  It was non exertional.  He ruled out and had a normal EKG, and was discharged with follow up.  In January 2019 he had an echocardiogram that showed normal LV function.  He had a stress test where he had a 9 METS exercise capacity with no symptoms.  He had a large area of inferior and inferolateral infarct, along with a medium sized area of mild mid to distal anterior wall ischemia.  Medical management was pursued.\par \par He presents today for followup. He went for an elective hernia repair today, and reported chest pain over the past weekend, and his surgery was postponed.\par On Saturday, he felt a left-sided chest burning, radiating down the left arm. The pain has been intermittent over the last few days. It seems sometimes to be exertional though there is no significant factor to make it better or worse. He denies significant palpitations or shortness of breath. He stopped taking his Plavix on Saturday, as recommended, prior to proceeding with his hernia surgery.

## 2019-05-22 NOTE — ED ADULT NURSE NOTE - CHPI ED NUR DURATION
Health Maintenance Due   Topic Date Due   • DTaP/Tdap/Td Vaccine (1 - Tdap) 06/23/1986   • Shingles Vaccine (1 of 2) 06/23/2017       Patient is due for topics as listed above but is not proceeding with Immunization(s) Dtap/Tdap/Td and Shingles at this time.              day(s)

## 2019-06-02 NOTE — CONSULT NOTE ADULT - SUBJECTIVE AND OBJECTIVE BOX
[facility] - Edvin Patel, Heaven, Roxi, Jose Miguel, Caroline Carney  Office Number: [phone]    Initial Consult Note    CHIEF COMPLAINT: Patient is a 68y old  Male who presents with a chief complaint of chest pain (26 Mar 2018 11:41)      HPI:  68 yr old male with PMH of  HTN, GERD CAD s/p PCI ANAHI (by Dr Shaw Cardiac Cath, s/p PCI stent x 1 to Prox LAD, stent x 1 to Ramus on 1/27/2016 at [facility]) presents with atypical chest pain. Pt states the pain started about 1 week ago, described as intermittent burning pain, which radiates to left elbow, lasting seconds. Chest pain is not associated with breathing, movements, leaning forward or walking. He denies the pain being as severe as when he had a heart attack in 2016. He follows with Cardiologist Dr Shaw, and is complaint with all his medications. Patient had planned to do a Stress Test prior to an inguinal hernia repair he has scheduled. In ED patient noted to be hypertensive 193/102, ECG no LAWRENCE or TWI, Tropinins neg x1. CXR no acute pathology (26 Mar 2018 09:32)    Patient seen and examined at bedside with wife present. Patient appeared comfortable and able to participate in exam. Patient had no active complaints.    PAST MEDICAL & SURGICAL HISTORY:  CAD (coronary artery disease): stents x 2  Hyperlipidemia  HTN (hypertension)  Gastroesophageal reflux disease, esophagitis presence not specified  Essential hypertension  No significant past surgical history  No significant past surgical history      SOCIAL HISTORY:  No tobacco, ethanol, or drug abuse.    FAMILY HISTORY:  No pertinent family history in first degree relatives    No family history of acute MI or sudden cardiac death.    MEDICATIONS  (STANDING):  aspirin enteric coated 81 milliGRAM(s) Oral daily  ATENolol  Tablet 50 milliGRAM(s) Oral daily  atorvastatin 20 milliGRAM(s) Oral at bedtime  clopidogrel Tablet 75 milliGRAM(s) Oral daily  enoxaparin Injectable 40 milliGRAM(s) SubCutaneous daily  folic acid 1 milliGRAM(s) Oral daily  pantoprazole    Tablet 40 milliGRAM(s) Oral before breakfast    MEDICATIONS  (PRN):      Allergies    No Known Allergies    Intolerances        REVIEW OF SYSTEMS:    CONSTITUTIONAL: No weakness, fevers or chills  EYES/ENT: No visual changes;  No vertigo or throat pain   NECK: No pain or stiffness  RESPIRATORY: No cough, wheezing, hemoptysis; No shortness of breath  CARDIOVASCULAR: +++ Left upper chest pain, no palpitations  GASTROINTESTINAL: No abdominal pain. No nausea, vomiting, or hematemesis; No diarrhea or constipation. No melena or hematochezia.  GENITOURINARY: No dysuria, frequency or hematuria  NEUROLOGICAL: No numbness or weakness  SKIN: No itching or rash  All other review of systems is negative unless indicated above    VITAL SIGNS:   Vital Signs Last 24 Hrs  T(C): 36.7 (26 Mar 2018 11:05), Max: 36.7 (26 Mar 2018 09:05)  T(F): 98.1 (26 Mar 2018 11:05), Max: 98.1 (26 Mar 2018 11:05)  HR: 63 (26 Mar 2018 12:03) (55 - 63)  BP: 153/73 (26 Mar 2018 11:05) (153/73 - 193/102)  BP(mean): --  RR: 16 (26 Mar 2018 11:05) (15 - 16)  SpO2: 100% (26 Mar 2018 11:05) (99% - 100%)    I&O's Summary      On Exam:    Constitutional: NAD, alert and oriented x 3  Lungs:  Non-labored, breath sounds are clear bilaterally, No wheezing, rales or rhonchi  Cardiovascular: RRR.  S1 and S2 positive.  No murmurs, rubs, gallops or clicks  Gastrointestinal: Bowel Sounds present, soft, nontender.   Lymph: No peripheral edema. No cervical lymphadenopathy.  Neurological: Alert, no focal deficits  Skin: No rashes or ulcers   Psych:  Mood & affect appropriate.    LABS: All Labs Reviewed:                        14.1   8.0   )-----------( 218      ( 26 Mar 2018 07:31 )             45.2     26 Mar 2018 07:31    136    |  104    |  32     ----------------------------<  110    4.5     |  26     |  1.80     Ca    9.2        26 Mar 2018 07:31    TPro  8.4    /  Alb  4.4    /  TBili  0.4    /  DBili  x      /  AST  22     /  ALT  48     /  AlkPhos  141    26 Mar 2018 07:31      CARDIAC MARKERS ( 26 Mar 2018 07:31 )  <.015 ng/mL / x     / x     / x     / x          Blood Culture:         RADIOLOGY:    CXR  IMPRESSION:  Normal chest    EKG:  NSR @ 66bpm Cellulitis of leg, left

## 2019-12-16 ENCOUNTER — MEDICATION RENEWAL (OUTPATIENT)
Age: 69
End: 2019-12-16

## 2020-01-24 ENCOUNTER — NON-APPOINTMENT (OUTPATIENT)
Age: 70
End: 2020-01-24

## 2020-01-24 ENCOUNTER — APPOINTMENT (OUTPATIENT)
Dept: CARDIOLOGY | Facility: CLINIC | Age: 70
End: 2020-01-24
Payer: COMMERCIAL

## 2020-01-24 VITALS
BODY MASS INDEX: 29.1 KG/M2 | WEIGHT: 192 LBS | DIASTOLIC BLOOD PRESSURE: 84 MMHG | OXYGEN SATURATION: 99 % | HEART RATE: 60 BPM | HEIGHT: 68 IN | SYSTOLIC BLOOD PRESSURE: 162 MMHG

## 2020-01-24 DIAGNOSIS — Z00.00 ENCOUNTER FOR GENERAL ADULT MEDICAL EXAMINATION W/OUT ABNORMAL FINDINGS: ICD-10-CM

## 2020-01-24 PROCEDURE — 99214 OFFICE O/P EST MOD 30 MIN: CPT

## 2020-01-24 PROCEDURE — 93000 ELECTROCARDIOGRAM COMPLETE: CPT

## 2020-01-24 RX ORDER — ESOMEPRAZOLE MAGNESIUM 40 MG/1
40 CAPSULE, DELAYED RELEASE ORAL DAILY
Refills: 0 | Status: DISCONTINUED | COMMUNITY
Start: 2018-04-26 | End: 2020-01-24

## 2020-02-03 ENCOUNTER — TRANSCRIPTION ENCOUNTER (OUTPATIENT)
Age: 70
End: 2020-02-03

## 2020-02-04 ENCOUNTER — TRANSCRIPTION ENCOUNTER (OUTPATIENT)
Age: 70
End: 2020-02-04

## 2020-02-04 LAB
ALBUMIN SERPL ELPH-MCNC: 4.6 G/DL
ALP BLD-CCNC: 116 U/L
ALT SERPL-CCNC: 58 U/L
ANION GAP SERPL CALC-SCNC: 12 MMOL/L
AST SERPL-CCNC: 27 U/L
BASOPHILS # BLD AUTO: 0.09 K/UL
BASOPHILS NFR BLD AUTO: 1.3 %
BILIRUB SERPL-MCNC: 0.5 MG/DL
BUN SERPL-MCNC: 24 MG/DL
CALCIUM SERPL-MCNC: 9.5 MG/DL
CHLORIDE SERPL-SCNC: 105 MMOL/L
CHOLEST SERPL-MCNC: 140 MG/DL
CHOLEST/HDLC SERPL: 3.6 RATIO
CO2 SERPL-SCNC: 24 MMOL/L
CREAT SERPL-MCNC: 1.64 MG/DL
EOSINOPHIL # BLD AUTO: 0.33 K/UL
EOSINOPHIL NFR BLD AUTO: 4.8 %
ESTIMATED AVERAGE GLUCOSE: 111 MG/DL
GLUCOSE SERPL-MCNC: 108 MG/DL
HBA1C MFR BLD HPLC: 5.5 %
HCT VFR BLD CALC: 42.2 %
HDLC SERPL-MCNC: 39 MG/DL
HGB BLD-MCNC: 13.4 G/DL
IMM GRANULOCYTES NFR BLD AUTO: 0.3 %
LDLC SERPL CALC-MCNC: 83 MG/DL
LYMPHOCYTES # BLD AUTO: 1.98 K/UL
LYMPHOCYTES NFR BLD AUTO: 28.7 %
MAN DIFF?: NORMAL
MCHC RBC-ENTMCNC: 23.7 PG
MCHC RBC-ENTMCNC: 31.8 GM/DL
MCV RBC AUTO: 74.7 FL
MONOCYTES # BLD AUTO: 0.62 K/UL
MONOCYTES NFR BLD AUTO: 9 %
NEUTROPHILS # BLD AUTO: 3.86 K/UL
NEUTROPHILS NFR BLD AUTO: 55.9 %
PLATELET # BLD AUTO: 238 K/UL
POTASSIUM SERPL-SCNC: 4.4 MMOL/L
PROT SERPL-MCNC: 7.2 G/DL
RBC # BLD: 5.65 M/UL
RBC # FLD: 16.1 %
SODIUM SERPL-SCNC: 141 MMOL/L
TRIGL SERPL-MCNC: 90 MG/DL
WBC # FLD AUTO: 6.9 K/UL

## 2020-02-27 ENCOUNTER — TRANSCRIPTION ENCOUNTER (OUTPATIENT)
Age: 70
End: 2020-02-27

## 2020-03-25 ENCOUNTER — APPOINTMENT (OUTPATIENT)
Dept: CARDIOLOGY | Facility: CLINIC | Age: 70
End: 2020-03-25

## 2020-03-31 NOTE — ED ADULT NURSE NOTE - NS ED NURSE DISCH DISPOSITION
From: Lynn Tao  To: Shamika Arroyo DO  Sent: 3/30/2020 12:01 PM CDT  Subject: Non-Urgent Medical Question    This message is being sent by Pearl Arora on behalf of Lynn Tao    Everytime Lynn tries to do the bathroom she forces to much she throws up what can I do? I have been doing what you said and just giving her cereal every other day but a very little only   Discharged

## 2020-09-23 NOTE — ED PROCEDURE NOTE - SUBCUTANEOUS SUTURE
interrupted Advancement-Rotation Flap Text: The defect edges were debeveled with a #15 scalpel blade.  Given the location of the defect, shape of the defect and the proximity to free margins an advancement-rotation flap was deemed most appropriate.  Using a sterile surgical marker, an appropriate flap was drawn incorporating the defect and placing the expected incisions within the relaxed skin tension lines where possible. The area thus outlined was incised deep to adipose tissue with a #15 scalpel blade.  The skin margins were undermined to an appropriate distance in all directions utilizing iris scissors.

## 2020-11-13 ENCOUNTER — APPOINTMENT (OUTPATIENT)
Dept: CARDIOLOGY | Facility: CLINIC | Age: 70
End: 2020-11-13
Payer: COMMERCIAL

## 2020-11-13 ENCOUNTER — NON-APPOINTMENT (OUTPATIENT)
Age: 70
End: 2020-11-13

## 2020-11-13 VITALS
WEIGHT: 187 LBS | BODY MASS INDEX: 28.34 KG/M2 | HEART RATE: 62 BPM | OXYGEN SATURATION: 99 % | SYSTOLIC BLOOD PRESSURE: 167 MMHG | DIASTOLIC BLOOD PRESSURE: 74 MMHG | HEIGHT: 68 IN

## 2020-11-13 PROCEDURE — 99214 OFFICE O/P EST MOD 30 MIN: CPT

## 2020-11-13 PROCEDURE — 99072 ADDL SUPL MATRL&STAF TM PHE: CPT

## 2020-11-13 PROCEDURE — 93000 ELECTROCARDIOGRAM COMPLETE: CPT

## 2021-01-04 NOTE — ED PROVIDER NOTE - ENMT, MLM
[facility]  Cardiac Rehab Department  [address]  [phone]  Toll Free [phone]              January 7, 2021    Dear Tiffanie Oleary,    Please find enclosed information concerning the care of your pacemaker insertion site and the guidelines you should follow for the next four weeks of your recovery. Each of these recommendations apply unless you were specifically instructed otherwise by your cardiologist.    If you have not already received one, a transtelephonic patient transmitter has been ordered for you on your behalf. When in your possession and appropriate, unbox the transmitter, plug it in and complete your first pacemaker check by following the instructional pictures in the easy-to-follow pamphlet or on the transmitter itself. In the event you have a 'smartphone', an erica may have been downloaded on your phone during your hospitilization to allow you to use your phone for the same purpose. If any questions or concerns arise or you experience difficulty completing the steps stated above, you should contact your cardiologist's office for assistance. Rest assured that the use of your transmitter will be reviewed with you as needed during your upcoming follow-up visit in Dr. Mirna Aleman office or via teleconference. Thank you. To Your [facility],        [facility] Cardiac Re[facility] Staff    ALPA Mendoza, BS, MA  Registered Nurse  Registered Nurse   Exercise Physiologist
Airway patent, Nasal mucosa clear. Mouth with normal mucosa. Throat has no vesicles, no oropharyngeal exudates and uvula is midline.

## 2021-04-25 NOTE — ED ADULT TRIAGE NOTE - CHIEF COMPLAINT QUOTE
"I cut my finger @ work today" lac to right 4th digit. Unsure of last tetanus Brunilda Knox(Attending)

## 2021-08-06 ENCOUNTER — APPOINTMENT (OUTPATIENT)
Dept: CARDIOLOGY | Facility: CLINIC | Age: 71
End: 2021-08-06
Payer: COMMERCIAL

## 2021-08-06 ENCOUNTER — NON-APPOINTMENT (OUTPATIENT)
Age: 71
End: 2021-08-06

## 2021-08-06 VITALS
BODY MASS INDEX: 28.79 KG/M2 | SYSTOLIC BLOOD PRESSURE: 155 MMHG | OXYGEN SATURATION: 99 % | DIASTOLIC BLOOD PRESSURE: 78 MMHG | WEIGHT: 190 LBS | HEIGHT: 68 IN | HEART RATE: 57 BPM

## 2021-08-06 PROCEDURE — 99214 OFFICE O/P EST MOD 30 MIN: CPT

## 2021-08-06 PROCEDURE — 93000 ELECTROCARDIOGRAM COMPLETE: CPT

## 2021-08-11 ENCOUNTER — TRANSCRIPTION ENCOUNTER (OUTPATIENT)
Age: 71
End: 2021-08-11

## 2021-12-14 ENCOUNTER — RX RENEWAL (OUTPATIENT)
Age: 71
End: 2021-12-14

## 2022-02-07 ENCOUNTER — RX RENEWAL (OUTPATIENT)
Age: 72
End: 2022-02-07

## 2022-04-08 ENCOUNTER — APPOINTMENT (OUTPATIENT)
Dept: CARDIOLOGY | Facility: CLINIC | Age: 72
End: 2022-04-08
Payer: COMMERCIAL

## 2022-04-08 ENCOUNTER — NON-APPOINTMENT (OUTPATIENT)
Age: 72
End: 2022-04-08

## 2022-04-08 VITALS
WEIGHT: 191 LBS | OXYGEN SATURATION: 97 % | DIASTOLIC BLOOD PRESSURE: 82 MMHG | HEIGHT: 68 IN | HEART RATE: 56 BPM | BODY MASS INDEX: 28.95 KG/M2 | SYSTOLIC BLOOD PRESSURE: 162 MMHG

## 2022-04-08 PROCEDURE — 93000 ELECTROCARDIOGRAM COMPLETE: CPT

## 2022-04-08 PROCEDURE — 99214 OFFICE O/P EST MOD 30 MIN: CPT

## 2022-05-13 ENCOUNTER — LABORATORY RESULT (OUTPATIENT)
Age: 72
End: 2022-05-13

## 2022-05-18 LAB
25(OH)D3 SERPL-MCNC: 14 NG/ML
ALBUMIN SERPL ELPH-MCNC: 4.5 G/DL
ALP BLD-CCNC: 121 U/L
ALT SERPL-CCNC: 57 U/L
ANION GAP SERPL CALC-SCNC: 14 MMOL/L
AST SERPL-CCNC: 28 U/L
BASOPHILS # BLD AUTO: 0.1 K/UL
BASOPHILS NFR BLD AUTO: 1.4 %
BILIRUB SERPL-MCNC: 0.3 MG/DL
BUN SERPL-MCNC: 28 MG/DL
CALCIUM SERPL-MCNC: 9.6 MG/DL
CHLORIDE SERPL-SCNC: 108 MMOL/L
CHOLEST SERPL-MCNC: 143 MG/DL
CO2 SERPL-SCNC: 20 MMOL/L
CREAT SERPL-MCNC: 1.57 MG/DL
EGFR: 47 ML/MIN/1.73M2
EOSINOPHIL # BLD AUTO: 0.27 K/UL
EOSINOPHIL NFR BLD AUTO: 3.8 %
ESTIMATED AVERAGE GLUCOSE: 114 MG/DL
GLUCOSE SERPL-MCNC: 101 MG/DL
HBA1C MFR BLD HPLC: 5.6 %
HCT VFR BLD CALC: 43.3 %
HDLC SERPL-MCNC: 38 MG/DL
HGB BLD-MCNC: 13.5 G/DL
IMM GRANULOCYTES NFR BLD AUTO: 0.4 %
LDLC SERPL CALC-MCNC: 87 MG/DL
LYMPHOCYTES # BLD AUTO: 1.92 K/UL
LYMPHOCYTES NFR BLD AUTO: 27 %
MAN DIFF?: NORMAL
MCHC RBC-ENTMCNC: 23 PG
MCHC RBC-ENTMCNC: 31.2 GM/DL
MCV RBC AUTO: 73.8 FL
MONOCYTES # BLD AUTO: 0.77 K/UL
MONOCYTES NFR BLD AUTO: 10.8 %
NEUTROPHILS # BLD AUTO: 4.01 K/UL
NEUTROPHILS NFR BLD AUTO: 56.6 %
NONHDLC SERPL-MCNC: 105 MG/DL
PLATELET # BLD AUTO: 244 K/UL
POTASSIUM SERPL-SCNC: 4.6 MMOL/L
PROT SERPL-MCNC: 7 G/DL
PSA FREE FLD-MCNC: 21 %
PSA FREE SERPL-MCNC: 0.61 NG/ML
PSA SERPL-MCNC: 2.94 NG/ML
RBC # BLD: 5.87 M/UL
RBC # FLD: 18 %
SODIUM SERPL-SCNC: 142 MMOL/L
T3RU NFR SERPL: 1 TBI
T4 SERPL-MCNC: 6.5 UG/DL
TRIGL SERPL-MCNC: 92 MG/DL
TSH SERPL-ACNC: 2.48 UIU/ML
WBC # FLD AUTO: 7.1 K/UL

## 2022-10-14 ENCOUNTER — NON-APPOINTMENT (OUTPATIENT)
Age: 72
End: 2022-10-14

## 2022-10-14 ENCOUNTER — APPOINTMENT (OUTPATIENT)
Dept: CARDIOLOGY | Facility: CLINIC | Age: 72
End: 2022-10-14

## 2022-10-14 VITALS
BODY MASS INDEX: 29.1 KG/M2 | OXYGEN SATURATION: 96 % | HEART RATE: 63 BPM | DIASTOLIC BLOOD PRESSURE: 84 MMHG | WEIGHT: 192 LBS | SYSTOLIC BLOOD PRESSURE: 158 MMHG | HEIGHT: 68 IN

## 2022-10-14 VITALS — DIASTOLIC BLOOD PRESSURE: 68 MMHG | SYSTOLIC BLOOD PRESSURE: 142 MMHG

## 2022-10-14 PROCEDURE — 99214 OFFICE O/P EST MOD 30 MIN: CPT

## 2022-10-14 PROCEDURE — 93000 ELECTROCARDIOGRAM COMPLETE: CPT

## 2022-10-14 RX ORDER — CLOPIDOGREL BISULFATE 75 MG/1
75 TABLET, FILM COATED ORAL
Qty: 90 | Refills: 3 | Status: DISCONTINUED | COMMUNITY
Start: 2018-04-26 | End: 2022-10-14

## 2022-10-14 RX ORDER — FOLIC ACID 1 MG/1
1 TABLET ORAL DAILY
Qty: 30 | Refills: 5 | Status: DISCONTINUED | COMMUNITY
Start: 2018-04-26 | End: 2022-10-14

## 2022-10-14 NOTE — PHYSICAL EXAM
[Normal Appearance] : normal appearance [General Appearance - In No Acute Distress] : no acute distress [Normal Conjunctiva] : the conjunctiva exhibited no abnormalities [Normal Oral Mucosa] : normal oral mucosa [Normal Jugular Venous V Waves Present] : normal jugular venous V waves present [Respiration, Rhythm And Depth] : normal respiratory rhythm and effort [Exaggerated Use Of Accessory Muscles For Inspiration] : no accessory muscle use [Auscultation Breath Sounds / Voice Sounds] : lungs were clear to auscultation bilaterally [Bowel Sounds] : normal bowel sounds [Abdomen Soft] : soft [Abdomen Tenderness] : non-tender [Abnormal Walk] : normal gait [Gait - Sufficient For Exercise Testing] : the gait was sufficient for exercise testing [Nail Clubbing] : no clubbing of the fingernails [Cyanosis, Localized] : no localized cyanosis [Petechial Hemorrhages (___cm)] : no petechial hemorrhages [Skin Color & Pigmentation] : normal skin color and pigmentation [] : no rash [No Venous Stasis] : no venous stasis [Skin Lesions] : no skin lesions [Oriented To Time, Place, And Person] : oriented to person, place, and time [Affect] : the affect was normal [Mood] : the mood was normal [No Anxiety] : not feeling anxious [Not Palpable] : not palpable [No Precordial Heave] : no precordial heave was noted [Bradycardia] : bradycardic [Rhythm Regular] : regular [Normal S1] : normal S1 [Normal S2] : normal S2 [No Gallop] : no gallop heard [No Murmur] : no murmurs heard [2+] : left 2+ [No Abnormalities] : the abdominal aorta was not enlarged and no bruit was heard [No Pitting Edema] : no pitting edema present [FreeTextEntry1] : No JVD [Right Carotid Bruit] : no bruit heard over the right carotid [Left Carotid Bruit] : no bruit heard over the left carotid [Bruit] : no bruit heard

## 2022-10-14 NOTE — HISTORY OF PRESENT ILLNESS
[FreeTextEntry1] : This is a 72 year old man with a history of CAD s/p PCI to the proximal LAD and RI in January of 2016, hypertension and hyperlipidemia who presents to the office for follow up.  He underwent nuclear stress testing in January of 2019 and was found to have a 9 METS exercise capacity with no symptoms.  He had a large area of inferior and inferolateral infarct, along with a medium sized area of mild mid to distal anterior wall ischemia.  We discussed the options, and given that his defect was mild, he had a robust exercise tolerance, and no symptoms, we elected to manage this medically.  He has not had any further chest pains since.   He denies dyspnea, PND, orthopnea, LE swelling, dizziness, or syncope.  He is taking his medications as prescribe.d  He is not eating right or exercising.  His blood pressure is elevated today.

## 2022-10-14 NOTE — CARDIOLOGY SUMMARY
Subjective   Jonathan Lakhani is a 17 y.o. male.   Chief Complaint   Patient presents with   • Sore Throat      History of Present Illness       Jonathan presents to [facility] accompanied by his father with cc of sore throat and low grade fever today, dad says he hasn't had any medications today.  Reviewed PMFSH, immunizations are UTD.  See ROS.        The following portions of the patient's history were reviewed and updated as appropriate: allergies, current medications, past family history, past medical history, past social history, past surgical history and problem list.    Review of Systems   Constitutional: Positive for fever. Negative for fatigue.   HENT: Positive for sore throat.    Respiratory: Negative for shortness of breath.    Cardiovascular: Negative for chest pain.   Endocrine: Negative for cold intolerance.   Skin: Negative for rash.   Neurological: Positive for headaches.   Hematological: Negative for adenopathy.     Pulse 83   Temp 98 °F (36.7 °C) (Temporal Artery )   Resp 18   Wt 106 kg (233 lb 9.6 oz)   SpO2 97%     Objective     Current Outpatient Prescriptions:   •  albuterol (PROVENTIL HFA;VENTOLIN HFA) 108 (90 BASE) MCG/ACT inhaler, Inhale 2 puffs every 4 (four) hours as needed for wheezing or shortness of air., Disp: 1 inhaler, Rfl: 0  •  amoxicillin (AMOXIL) 400 MG/5ML suspension, Take 12.6 mL by mouth 2 (Two) Times a Day for 10 days., Disp: 252 mL, Rfl: 0  No Known Allergies    Physical Exam   Constitutional: He is oriented to person, place, and time. He appears well-developed and well-nourished. No distress.   HENT:   Head: Normocephalic and atraumatic.   Right Ear: Tympanic membrane and external ear normal.   Left Ear: Tympanic membrane and external ear normal.   Nose: Mucosal edema present. Right sinus exhibits no maxillary sinus tenderness and no frontal sinus tenderness. Left sinus exhibits no maxillary sinus tenderness and no frontal sinus tenderness.   Mouth/Throat: Uvula is midline and  [___] : [unfilled] mucous membranes are normal. Posterior oropharyngeal erythema present. No oropharyngeal exudate. Tonsils are 2+ on the right. Tonsils are 2+ on the left.   Eyes: Conjunctivae and EOM are normal. Pupils are equal, round, and reactive to light.   Neck: Normal range of motion. Neck supple.   Cardiovascular: Normal rate, regular rhythm and normal heart sounds.    Pulmonary/Chest: Effort normal and breath sounds normal. No respiratory distress.   Abdominal: Soft. Bowel sounds are normal.   Musculoskeletal: Normal range of motion.   Lymphadenopathy:     He has no cervical adenopathy.   Neurological: He is alert and oriented to person, place, and time.   Skin: Skin is warm and dry. No rash noted.   Psychiatric: He has a normal mood and affect. His behavior is normal. Judgment and thought content normal.   Nursing note and vitals reviewed.      Assessment/Plan   Jonathan was seen today for sore throat.    Diagnoses and all orders for this visit:    Strep pharyngitis  -     POCT rapid strep A  -     amoxicillin (AMOXIL) 400 MG/5ML suspension; Take 12.6 mL by mouth 2 (Two) Times a Day for 10 days.      Results for orders placed or performed in visit on 04/11/18   POCT rapid strep A   Result Value Ref Range    Rapid Strep A Screen Positive (A) Negative, VALID, INVALID, Not Performed    Internal Control Passed Passed    Lot Number [id number]     Expiration Date 12/19                [None] : normal LV function [de-identified] : Sinus bradycardia.

## 2022-10-21 DIAGNOSIS — M79.89 OTHER SPECIFIED SOFT TISSUE DISORDERS: ICD-10-CM

## 2022-10-22 ENCOUNTER — OUTPATIENT (OUTPATIENT)
Dept: OUTPATIENT SERVICES | Facility: HOSPITAL | Age: 72
LOS: 1 days | End: 2022-10-22
Payer: SELF-PAY

## 2022-10-22 ENCOUNTER — APPOINTMENT (OUTPATIENT)
Dept: ULTRASOUND IMAGING | Facility: CLINIC | Age: 72
End: 2022-10-22

## 2022-10-22 DIAGNOSIS — M79.89 OTHER SPECIFIED SOFT TISSUE DISORDERS: ICD-10-CM

## 2022-10-22 DIAGNOSIS — I25.10 ATHEROSCLEROTIC HEART DISEASE OF NATIVE CORONARY ARTERY WITHOUT ANGINA PECTORIS: Chronic | ICD-10-CM

## 2022-10-22 PROCEDURE — 93971 EXTREMITY STUDY: CPT | Mod: 26,LT

## 2022-10-22 PROCEDURE — 93971 EXTREMITY STUDY: CPT

## 2022-12-21 NOTE — DISCUSSION/SUMMARY
Next appt none  Last appt 10/14/2022    Refill request for   Disp Refills Start End    fluoxetine (PROzac) 10 MG tablet 34 tablet 0 11/16/2022 12/25/2022    Sig - Route: Take 0.5 tablets by mouth daily for 8 days, THEN 1 tablet daily. - Oral        New medication, refill appropriate?    Routed to provider's pool to review, thanks.        [FreeTextEntry1] : This is a 72 year old man with a history of CAD s/p PCI to the proximal LAD and RI in January of 2016, hypertension and hyperlipidemia who presents to the office for follow up.  He underwent nuclear stress testing in January of 2019 and was found to have a 9 METS exercise capacity with no symptoms.  He had a large area of inferior and inferolateral infarct, along with a medium sized area of mild mid to distal anterior wall ischemia.  We discussed the options, and given that his defect was mild, he had a robust exercise tolerance, and no symptoms, we elected to manage this medically.  He has not had any further chest pains since.   He continues asymptomatic, and I am going to continue to manage his CAD medically.  His BP is mildly high.  He is due for his medications, and will likely come down.  He will continue Imdur 120 QD.  He will continue amlodipine and atenolol at the current doses.  We discussed that he needs to exercise, eat better, and lose weight.  He will continue his statin drug.  I am stopping Plavix as he is far removed from PCI.  He will follow in six months.

## 2023-01-10 ENCOUNTER — RX RENEWAL (OUTPATIENT)
Age: 73
End: 2023-01-10

## 2023-01-31 ENCOUNTER — NON-APPOINTMENT (OUTPATIENT)
Age: 73
End: 2023-01-31

## 2023-01-31 ENCOUNTER — APPOINTMENT (OUTPATIENT)
Dept: CARDIOLOGY | Facility: CLINIC | Age: 73
End: 2023-01-31
Payer: COMMERCIAL

## 2023-01-31 VITALS
DIASTOLIC BLOOD PRESSURE: 96 MMHG | BODY MASS INDEX: 29.55 KG/M2 | WEIGHT: 195 LBS | HEIGHT: 68 IN | OXYGEN SATURATION: 99 % | HEART RATE: 99 BPM | SYSTOLIC BLOOD PRESSURE: 153 MMHG

## 2023-01-31 PROCEDURE — 93000 ELECTROCARDIOGRAM COMPLETE: CPT

## 2023-01-31 PROCEDURE — 99215 OFFICE O/P EST HI 40 MIN: CPT

## 2023-02-03 ENCOUNTER — TRANSCRIPTION ENCOUNTER (OUTPATIENT)
Age: 73
End: 2023-02-03

## 2023-02-06 ENCOUNTER — TRANSCRIPTION ENCOUNTER (OUTPATIENT)
Age: 73
End: 2023-02-06

## 2023-02-07 ENCOUNTER — EMERGENCY (EMERGENCY)
Facility: HOSPITAL | Age: 73
LOS: 1 days | Discharge: HOME | End: 2023-02-07
Attending: STUDENT IN AN ORGANIZED HEALTH CARE EDUCATION/TRAINING PROGRAM | Admitting: EMERGENCY MEDICINE
Payer: COMMERCIAL

## 2023-02-07 VITALS
WEIGHT: 190.04 LBS | HEIGHT: 64 IN | SYSTOLIC BLOOD PRESSURE: 145 MMHG | HEART RATE: 60 BPM | TEMPERATURE: 98 F | DIASTOLIC BLOOD PRESSURE: 78 MMHG | RESPIRATION RATE: 18 BRPM | OXYGEN SATURATION: 100 %

## 2023-02-07 DIAGNOSIS — I25.10 ATHEROSCLEROTIC HEART DISEASE OF NATIVE CORONARY ARTERY WITHOUT ANGINA PECTORIS: Chronic | ICD-10-CM

## 2023-02-07 PROCEDURE — 93010 ELECTROCARDIOGRAM REPORT: CPT

## 2023-02-07 PROCEDURE — 99285 EMERGENCY DEPT VISIT HI MDM: CPT

## 2023-02-07 NOTE — ED ADULT TRIAGE NOTE - CHIEF COMPLAINT QUOTE
Pt c/ochet pain under left pectoral area since this morning, pt sts he was supposed to have some cardiac testing done last week but never followed up.

## 2023-02-07 NOTE — ED ADULT NURSE NOTE - CHIEF COMPLAINT QUOTE
Pt c/o chest pain under left pectoral area since this morning, pt sts he was supposed to have some cardiac testing done last week but never followed up.

## 2023-02-07 NOTE — ED ADULT NURSE NOTE - OBJECTIVE STATEMENT
received pt stable alert oriented x4 with c/o of chest pain pt placed on cardiac monitor  ekg done and blood work drawned and sent to lab  awaiting repeat cardiac enezyme and cardio in am

## 2023-02-08 VITALS
RESPIRATION RATE: 16 BRPM | TEMPERATURE: 98 F | SYSTOLIC BLOOD PRESSURE: 160 MMHG | OXYGEN SATURATION: 100 % | HEART RATE: 57 BPM | DIASTOLIC BLOOD PRESSURE: 75 MMHG

## 2023-02-08 LAB
ALBUMIN SERPL ELPH-MCNC: 3.8 G/DL — SIGNIFICANT CHANGE UP (ref 3.3–5)
ALP SERPL-CCNC: 89 U/L — SIGNIFICANT CHANGE UP (ref 40–120)
ALT FLD-CCNC: 36 U/L — SIGNIFICANT CHANGE UP (ref 12–78)
ANION GAP SERPL CALC-SCNC: 6 MMOL/L — SIGNIFICANT CHANGE UP (ref 5–17)
ANISOCYTOSIS BLD QL: SLIGHT — SIGNIFICANT CHANGE UP
APTT BLD: 43.4 SEC — HIGH (ref 27.5–35.5)
AST SERPL-CCNC: 21 U/L — SIGNIFICANT CHANGE UP (ref 15–37)
BASOPHILS # BLD AUTO: 0.05 K/UL — SIGNIFICANT CHANGE UP (ref 0–0.2)
BASOPHILS NFR BLD AUTO: 0.7 % — SIGNIFICANT CHANGE UP (ref 0–2)
BILIRUB SERPL-MCNC: 0.3 MG/DL — SIGNIFICANT CHANGE UP (ref 0.2–1.2)
BUN SERPL-MCNC: 23 MG/DL — SIGNIFICANT CHANGE UP (ref 7–23)
CALCIUM SERPL-MCNC: 8.8 MG/DL — SIGNIFICANT CHANGE UP (ref 8.5–10.1)
CHLORIDE SERPL-SCNC: 110 MMOL/L — HIGH (ref 96–108)
CK MB BLD-MCNC: 1.1 % — SIGNIFICANT CHANGE UP (ref 0–3.5)
CK MB BLD-MCNC: 1.8 % — SIGNIFICANT CHANGE UP (ref 0–3.5)
CK MB CFR SERPL CALC: 1.1 NG/ML — SIGNIFICANT CHANGE UP (ref 0–3.6)
CK MB CFR SERPL CALC: 1.4 NG/ML — SIGNIFICANT CHANGE UP (ref 0–3.6)
CK SERPL-CCNC: 79 U/L — SIGNIFICANT CHANGE UP (ref 26–308)
CK SERPL-CCNC: 96 U/L — SIGNIFICANT CHANGE UP (ref 26–308)
CO2 SERPL-SCNC: 28 MMOL/L — SIGNIFICANT CHANGE UP (ref 22–31)
CREAT SERPL-MCNC: 1.5 MG/DL — HIGH (ref 0.5–1.3)
D DIMER BLD IA.RAPID-MCNC: <150 NG/ML DDU — SIGNIFICANT CHANGE UP
EGFR: 50 ML/MIN/1.73M2 — LOW
EOSINOPHIL # BLD AUTO: 0.33 K/UL — SIGNIFICANT CHANGE UP (ref 0–0.5)
EOSINOPHIL NFR BLD AUTO: 4.9 % — SIGNIFICANT CHANGE UP (ref 0–6)
GLUCOSE SERPL-MCNC: 105 MG/DL — HIGH (ref 70–99)
HCT VFR BLD CALC: 41.3 % — SIGNIFICANT CHANGE UP (ref 39–50)
HGB BLD-MCNC: 13.2 G/DL — SIGNIFICANT CHANGE UP (ref 13–17)
IMM GRANULOCYTES NFR BLD AUTO: 0.3 % — SIGNIFICANT CHANGE UP (ref 0–0.9)
INR BLD: 1.69 RATIO — HIGH (ref 0.88–1.16)
LYMPHOCYTES # BLD AUTO: 1.83 K/UL — SIGNIFICANT CHANGE UP (ref 1–3.3)
LYMPHOCYTES # BLD AUTO: 27.3 % — SIGNIFICANT CHANGE UP (ref 13–44)
MANUAL SMEAR VERIFICATION: SIGNIFICANT CHANGE UP
MCHC RBC-ENTMCNC: 23.6 PG — LOW (ref 27–34)
MCHC RBC-ENTMCNC: 32 GM/DL — SIGNIFICANT CHANGE UP (ref 32–36)
MCV RBC AUTO: 73.9 FL — LOW (ref 80–100)
MONOCYTES # BLD AUTO: 0.64 K/UL — SIGNIFICANT CHANGE UP (ref 0–0.9)
MONOCYTES NFR BLD AUTO: 9.6 % — SIGNIFICANT CHANGE UP (ref 2–14)
NEUTROPHILS # BLD AUTO: 3.83 K/UL — SIGNIFICANT CHANGE UP (ref 1.8–7.4)
NEUTROPHILS NFR BLD AUTO: 57.2 % — SIGNIFICANT CHANGE UP (ref 43–77)
NRBC # BLD: 0 /100 WBCS — SIGNIFICANT CHANGE UP (ref 0–0)
NT-PROBNP SERPL-SCNC: 384 PG/ML — HIGH (ref 0–125)
PLAT MORPH BLD: NORMAL — SIGNIFICANT CHANGE UP
PLATELET # BLD AUTO: 255 K/UL — SIGNIFICANT CHANGE UP (ref 150–400)
POIKILOCYTOSIS BLD QL AUTO: SLIGHT — SIGNIFICANT CHANGE UP
POTASSIUM SERPL-MCNC: 4.4 MMOL/L — SIGNIFICANT CHANGE UP (ref 3.5–5.3)
POTASSIUM SERPL-SCNC: 4.4 MMOL/L — SIGNIFICANT CHANGE UP (ref 3.5–5.3)
PROT SERPL-MCNC: 7.2 G/DL — SIGNIFICANT CHANGE UP (ref 6–8.3)
PROTHROM AB SERPL-ACNC: 19.9 SEC — HIGH (ref 10.5–13.4)
RAPID RVP RESULT: SIGNIFICANT CHANGE UP
RBC # BLD: 5.59 M/UL — SIGNIFICANT CHANGE UP (ref 4.2–5.8)
RBC # FLD: 15.8 % — HIGH (ref 10.3–14.5)
RBC BLD AUTO: SIGNIFICANT CHANGE UP
SARS-COV-2 RNA SPEC QL NAA+PROBE: SIGNIFICANT CHANGE UP
SODIUM SERPL-SCNC: 144 MMOL/L — SIGNIFICANT CHANGE UP (ref 135–145)
TROPONIN I, HIGH SENSITIVITY RESULT: 10.3 NG/L — SIGNIFICANT CHANGE UP
TROPONIN I, HIGH SENSITIVITY RESULT: 11.9 NG/L — SIGNIFICANT CHANGE UP
WBC # BLD: 6.7 K/UL — SIGNIFICANT CHANGE UP (ref 3.8–10.5)
WBC # FLD AUTO: 6.7 K/UL — SIGNIFICANT CHANGE UP (ref 3.8–10.5)

## 2023-02-08 PROCEDURE — 85379 FIBRIN DEGRADATION QUANT: CPT

## 2023-02-08 PROCEDURE — 0225U NFCT DS DNA&RNA 21 SARSCOV2: CPT

## 2023-02-08 PROCEDURE — 71045 X-RAY EXAM CHEST 1 VIEW: CPT | Mod: 26

## 2023-02-08 PROCEDURE — 99285 EMERGENCY DEPT VISIT HI MDM: CPT | Mod: 25

## 2023-02-08 PROCEDURE — 85025 COMPLETE CBC W/AUTO DIFF WBC: CPT

## 2023-02-08 PROCEDURE — 82550 ASSAY OF CK (CPK): CPT

## 2023-02-08 PROCEDURE — 85610 PROTHROMBIN TIME: CPT

## 2023-02-08 PROCEDURE — 84484 ASSAY OF TROPONIN QUANT: CPT

## 2023-02-08 PROCEDURE — 99284 EMERGENCY DEPT VISIT MOD MDM: CPT

## 2023-02-08 PROCEDURE — 93010 ELECTROCARDIOGRAM REPORT: CPT

## 2023-02-08 PROCEDURE — 80053 COMPREHEN METABOLIC PANEL: CPT

## 2023-02-08 PROCEDURE — 82553 CREATINE MB FRACTION: CPT

## 2023-02-08 PROCEDURE — 36415 COLL VENOUS BLD VENIPUNCTURE: CPT

## 2023-02-08 PROCEDURE — 71045 X-RAY EXAM CHEST 1 VIEW: CPT

## 2023-02-08 PROCEDURE — 83880 ASSAY OF NATRIURETIC PEPTIDE: CPT

## 2023-02-08 PROCEDURE — 85730 THROMBOPLASTIN TIME PARTIAL: CPT

## 2023-02-08 PROCEDURE — 93005 ELECTROCARDIOGRAM TRACING: CPT

## 2023-02-08 NOTE — ED PROVIDER NOTE - PATIENT PORTAL LINK FT
You can access the FollowMyHealth Patient Portal offered by [facility] by registering at the following website: [email or web address]. By joining [employer]’s FollowMyHealth portal, you will also be able to view your health information using other applications (apps) compatible with our system.

## 2023-02-08 NOTE — CONSULT NOTE ADULT - SUBJECTIVE AND OBJECTIVE BOX
[facility]         Edvin Patel, Heaven, Roxi, Jose Miguel, Caroline Carney        [phone] (office)    Reason for Consult: Chest pain     HPI:  71 yo M with PMHx of CAD s/p PCI to prox LAD + RI (2016), Afib (on Xarelto), Hypertension, Hyperlipidemia presents to ED c/o chest pain. Pt reports he was lifting heavy boxes at work an doing chest and back exercises yesterday. Later that evening he began to experience 6/10 L-sided chest pain, constant, non-radiating to jaw/extremities, worse with leaning forward and improved with leaning backwards. Pt reports the pain feels like a muscle strain and the pain is unlike the pain he experienced in 2016 at the time of his previous MI. Denies headache, dizziness, palpitations, dizziness, shortness of breath. Admits to chronic LE edema. Pt follows with Cardio Dr. Gillespie outpt. He last saw him on  and is scheduled for follow up with him on . Of note, previous stress test (2019) showed METS 9 and Echo (2018) showed EF 59%.     PAST MEDICAL & SURGICAL HISTORY:  Essential hypertension      Gastroesophageal reflux disease, esophagitis presence not specified      HTN (hypertension)      Hyperlipidemia      CAD (coronary artery disease)  stents x 2      CAD S/P percutaneous coronary angioplasty  with ANAHI          SOCIAL HISTORY: No active tobacco, alcohol or illicit drug use    FAMILY HISTORY:  FH: heart disease  father and mother         Home Medications:  aspirin 81 mg oral tablet: 1 tab(s) orally once a day (2023 06:46)  atenolol 50 mg oral tablet: 1 tab(s) orally once a day (2023 06:46)  Colace 100 mg oral capsule: as needed (2023 06:46)  folic acid 1 mg oral tablet: 1 tab(s) orally once a day (2023 06:46)  Lipitor 20 mg oral tablet: 1 tab(s) orally once a day (2023 06:46)  Xarelto:  (2023 06:46)      MEDICATIONS  (STANDING):    MEDICATIONS  (PRN):      Allergies    No Known Allergies    Intolerances        REVIEW OF SYSTEMS: Negative except as per HPI.    VITAL SIGNS:   Vital Signs Last 24 Hrs  T(C): 36.7 (2023 07:41), Max: 36.7 (2023 06:00)  T(F): 98 (2023 07:41), Max: 98 (2023 06:00)  HR: 57 (2023 07:41) (56 - 60)  BP: 160/75 (2023 07:41) (130/74 - 160/75)  BP(mean): --  RR: 16 (2023 07:41) (16 - 18)  SpO2: 100% (2023 07:41) (100% - 100%)    Parameters below as of 2023 07:41  Patient On (Oxygen Delivery Method): room air        I&O's Summary      PHYSICAL EXAM:  Constitutional: NAD, well-appearing  HEENT: NC/AT, moist mucous membranes  Pulmonary: Non-labored, breath sounds are clear bilaterally, no wheezing, rales or rhonchi  Cardiovascular: +S1, S2, RRR, no murmur  Gastrointestinal: Soft, nontender, nondistended  MSK: tenderness to deep palpation of L chest   Extremities: b/l LE edema, no calf tenderness  Neurological: AAOx3, appropriately responsive to questions and commands  Skin: No obvious lesions/rashes  Psych: Mood & affect appropriate    LABS: All Labs Reviewed:                        13.2   6.70  )-----------( 255      ( 2023 00:40 )             41.3     2023 00:40    144    |  110    |  23     ----------------------------<  105    4.4     |  28     |  1.50     Ca    8.8        2023 00:40    TPro  7.2    /  Alb  3.8    /  TBili  0.3    /  DBili  x      /  AST  21     /  ALT  36     /  AlkPhos  89     2023 00:40    PT/INR - ( 2023 00:40 )   PT: 19.9 sec;   INR: 1.69 ratio         PTT - ( 2023 00:40 )  PTT:43.4 sec  CARDIAC MARKERS ( 2023 04:31 )  x     / x     / 79 U/L / x     / 1.4 ng/mL  CARDIAC MARKERS ( 2023 00:40 )  x     / x     / 96 U/L / x     / 1.1 ng/mL      Blood Culture:    @ 00:40  Pro Bnp 384        EKG:    RADIOLOGY:    CXR:

## 2023-02-08 NOTE — ED PROVIDER NOTE - OBJECTIVE STATEMENT
72 year old male with a history of CAD, stent x 2, HTN, HLD, presents with chest pain that started yesterday morning.  Patient states he was at work and developed pain to his left pectoral region around 11:00am.  It was initially dull but then became constant.  He described it as a "muscle pull" and feel it is worse with movement and sitting up.  No radiation to his back, arm, jaw.  No associated n/v/SOB/diaphoresis.  Patient was started on Xarelto last week, stopped taking Plavix in October.  He also started taking an extra tab of Atenolol as per cardiology.  He is scheduled to see cardiology on 2/16 and wife states patient had an abnormal EKG at his last visit, possible a-fib.  PMD Dr. Lazo, Cardiology Dr. Gillespie

## 2023-02-08 NOTE — CONSULT NOTE ADULT - ATTENDING COMMENTS
I have personally seen and examined the patient in detail.  I have spoken to the provider regarding the assessment and plan of care.  I have made changes to the note accordingly.    71 yo M with PMHx of CAD s/p PCI to prox LAD + RI (1/2016), Afib (on Xarelto), Hypertension, Hyperlipidemia presents to ED c/o chest pain.    Chest pain  - Likely musculoskeletal   - Pain is localized, reproducible upon palpation and reproducible with positional changes  - No clear evidence of acute ischemia, trops negative x 2, CKs negative   - No acute changes on EKG compared to previous  - Patient stable for dc from cardio standpoint with outpt follow up with Dr. Gillespie on 1/16    Afib   - NSR upon arrival but AF during exam  - Continue home Atenolol and Xarelto    Thank you for this consult!

## 2023-02-08 NOTE — ED PROVIDER NOTE - CARE PROVIDER_API CALL
JOHNY TAY  Internal Medicine  Phone: [phone]  Fax: [phone]  Follow Up Time:     Ezekiel Gillespie)  [place] [address]  Phone: [phone]  Fax: [phone]  Follow Up Time:

## 2023-02-08 NOTE — ED PROVIDER NOTE - CLINICAL SUMMARY MEDICAL DECISION MAKING FREE TEXT BOX
72 year old male with CAD, stent x 2, HTN, HLD p/w chest pain since yesterday, left-sided, worse with movement.  Check labs, CE x 2, CXR, EKG, cardiac monitor.  AM cardiology consult. Evaluate for MI, CAD, CHF, PE, PNA

## 2023-02-08 NOTE — CONSULT NOTE ADULT - ASSESSMENT
71 yo M with PMHx of CAD s/p PCI to prox LAD + RI (1/2016), Afib (on Xarelto), Hypertension, Hyperlipidemia presents to ED c/o chest pain.    Chest pain  - Likely musculoskeletal   - Pain is localized, reproducible upon palpation and reproducible with positional changes  - No clear evidence of acute ischemia, trops negative x 2, CKs negative   - No acute changes on EKG compared to previous  - Patient stable for dc from cardio standpoint with outpt follow up with Dr. Gillespie on 1/16    Afib   - Continue home Atenolol and Xarelto    Hypertension   - Continue home Amlodipine and Imdur    Hyperlipidemia   - Continue home ASA and statin

## 2023-02-08 NOTE — ED PROVIDER NOTE - LOCATION
pectoral region Melolabial Transposition Flap Text: The defect edges were debeveled with a #15 scalpel blade.  Given the location of the defect and the proximity to free margins a melolabial flap was deemed most appropriate.  Using a sterile surgical marker, an appropriate melolabial transposition flap was drawn incorporating the defect.    The area thus outlined was incised deep to adipose tissue with a #15 scalpel blade.  The skin margins were undermined to an appropriate distance in all directions utilizing iris scissors.

## 2023-02-16 ENCOUNTER — APPOINTMENT (OUTPATIENT)
Dept: CARDIOLOGY | Facility: CLINIC | Age: 73
End: 2023-02-16

## 2023-03-07 ENCOUNTER — NON-APPOINTMENT (OUTPATIENT)
Age: 73
End: 2023-03-07

## 2023-03-13 NOTE — H&P PST ADULT - NEGATIVE GENERAL SYMPTOMS
Patient wife called in asking to speak with WAI Sawyer and requesting a call back. Patients wife would not specify what it was regarding but stated it is important information   no chills/no fever/no sweating/no anorexia/no weight loss/no weight gain

## 2023-06-07 NOTE — ED PROVIDER NOTE - GASTROINTESTINAL NEGATIVE STATEMENT, MLM
MD Jose C Montalvo MD Jose C Montalvo MD, Damian Justice MD Jose C Montalvo MD, Damian Justice no abdominal pain, no bloating, no constipation, no diarrhea, no nausea and no vomiting.

## 2023-06-08 NOTE — ED PROVIDER NOTE - CROS ED GI ALL NEG
negative... Azathioprine Pregnancy And Lactation Text: This medication is Pregnancy Category D and isn't considered safe during pregnancy. It is unknown if this medication is excreted in breast milk.

## 2023-12-20 ENCOUNTER — INPATIENT (INPATIENT)
Facility: HOSPITAL | Age: 73
LOS: 3 days | Discharge: HOME | DRG: 291 | End: 2023-12-24
Attending: FAMILY MEDICINE | Admitting: FAMILY MEDICINE
Payer: COMMERCIAL

## 2023-12-20 VITALS
RESPIRATION RATE: 18 BRPM | SYSTOLIC BLOOD PRESSURE: 194 MMHG | WEIGHT: 195.11 LBS | HEIGHT: 64 IN | HEART RATE: 84 BPM | DIASTOLIC BLOOD PRESSURE: 114 MMHG | OXYGEN SATURATION: 88 % | TEMPERATURE: 98 F

## 2023-12-20 DIAGNOSIS — I25.10 ATHEROSCLEROTIC HEART DISEASE OF NATIVE CORONARY ARTERY WITHOUT ANGINA PECTORIS: Chronic | ICD-10-CM

## 2023-12-20 LAB
ALBUMIN SERPL ELPH-MCNC: 3.6 G/DL — SIGNIFICANT CHANGE UP (ref 3.3–5)
ALBUMIN SERPL ELPH-MCNC: 3.6 G/DL — SIGNIFICANT CHANGE UP (ref 3.3–5)
ALP SERPL-CCNC: 115 U/L — SIGNIFICANT CHANGE UP (ref 40–120)
ALP SERPL-CCNC: 115 U/L — SIGNIFICANT CHANGE UP (ref 40–120)
ALT FLD-CCNC: 33 U/L — SIGNIFICANT CHANGE UP (ref 12–78)
ALT FLD-CCNC: 33 U/L — SIGNIFICANT CHANGE UP (ref 12–78)
ANION GAP SERPL CALC-SCNC: 3 MMOL/L — LOW (ref 5–17)
ANION GAP SERPL CALC-SCNC: 3 MMOL/L — LOW (ref 5–17)
ANISOCYTOSIS BLD QL: SLIGHT — SIGNIFICANT CHANGE UP
ANISOCYTOSIS BLD QL: SLIGHT — SIGNIFICANT CHANGE UP
APTT BLD: 40.7 SEC — HIGH (ref 24.5–35.6)
APTT BLD: 40.7 SEC — HIGH (ref 24.5–35.6)
AST SERPL-CCNC: 18 U/L — SIGNIFICANT CHANGE UP (ref 15–37)
AST SERPL-CCNC: 18 U/L — SIGNIFICANT CHANGE UP (ref 15–37)
BASOPHILS # BLD AUTO: 0.09 K/UL — SIGNIFICANT CHANGE UP (ref 0–0.2)
BASOPHILS # BLD AUTO: 0.09 K/UL — SIGNIFICANT CHANGE UP (ref 0–0.2)
BASOPHILS NFR BLD AUTO: 0.7 % — SIGNIFICANT CHANGE UP (ref 0–2)
BASOPHILS NFR BLD AUTO: 0.7 % — SIGNIFICANT CHANGE UP (ref 0–2)
BILIRUB SERPL-MCNC: 0.5 MG/DL — SIGNIFICANT CHANGE UP (ref 0.2–1.2)
BILIRUB SERPL-MCNC: 0.5 MG/DL — SIGNIFICANT CHANGE UP (ref 0.2–1.2)
BUN SERPL-MCNC: 39 MG/DL — HIGH (ref 7–23)
BUN SERPL-MCNC: 39 MG/DL — HIGH (ref 7–23)
CALCIUM SERPL-MCNC: 8.5 MG/DL — SIGNIFICANT CHANGE UP (ref 8.5–10.1)
CALCIUM SERPL-MCNC: 8.5 MG/DL — SIGNIFICANT CHANGE UP (ref 8.5–10.1)
CHLORIDE SERPL-SCNC: 112 MMOL/L — HIGH (ref 96–108)
CHLORIDE SERPL-SCNC: 112 MMOL/L — HIGH (ref 96–108)
CO2 SERPL-SCNC: 25 MMOL/L — SIGNIFICANT CHANGE UP (ref 22–31)
CO2 SERPL-SCNC: 25 MMOL/L — SIGNIFICANT CHANGE UP (ref 22–31)
CREAT SERPL-MCNC: 2 MG/DL — HIGH (ref 0.5–1.3)
CREAT SERPL-MCNC: 2 MG/DL — HIGH (ref 0.5–1.3)
EGFR: 35 ML/MIN/1.73M2 — LOW
EGFR: 35 ML/MIN/1.73M2 — LOW
EOSINOPHIL # BLD AUTO: 0.56 K/UL — HIGH (ref 0–0.5)
EOSINOPHIL # BLD AUTO: 0.56 K/UL — HIGH (ref 0–0.5)
EOSINOPHIL NFR BLD AUTO: 4.2 % — SIGNIFICANT CHANGE UP (ref 0–6)
EOSINOPHIL NFR BLD AUTO: 4.2 % — SIGNIFICANT CHANGE UP (ref 0–6)
FLUAV AG NPH QL: SIGNIFICANT CHANGE UP
FLUAV AG NPH QL: SIGNIFICANT CHANGE UP
FLUBV AG NPH QL: SIGNIFICANT CHANGE UP
FLUBV AG NPH QL: SIGNIFICANT CHANGE UP
GLUCOSE SERPL-MCNC: 134 MG/DL — HIGH (ref 70–99)
GLUCOSE SERPL-MCNC: 134 MG/DL — HIGH (ref 70–99)
HCT VFR BLD CALC: 42.6 % — SIGNIFICANT CHANGE UP (ref 39–50)
HCT VFR BLD CALC: 42.6 % — SIGNIFICANT CHANGE UP (ref 39–50)
HGB BLD-MCNC: 13.5 G/DL — SIGNIFICANT CHANGE UP (ref 13–17)
HGB BLD-MCNC: 13.5 G/DL — SIGNIFICANT CHANGE UP (ref 13–17)
IMM GRANULOCYTES NFR BLD AUTO: 0.5 % — SIGNIFICANT CHANGE UP (ref 0–0.9)
IMM GRANULOCYTES NFR BLD AUTO: 0.5 % — SIGNIFICANT CHANGE UP (ref 0–0.9)
INR BLD: 1.9 RATIO — HIGH (ref 0.85–1.18)
INR BLD: 1.9 RATIO — HIGH (ref 0.85–1.18)
LYMPHOCYTES # BLD AUTO: 1.83 K/UL — SIGNIFICANT CHANGE UP (ref 1–3.3)
LYMPHOCYTES # BLD AUTO: 1.83 K/UL — SIGNIFICANT CHANGE UP (ref 1–3.3)
LYMPHOCYTES # BLD AUTO: 13.7 % — SIGNIFICANT CHANGE UP (ref 13–44)
LYMPHOCYTES # BLD AUTO: 13.7 % — SIGNIFICANT CHANGE UP (ref 13–44)
MAGNESIUM SERPL-MCNC: 1.9 MG/DL — SIGNIFICANT CHANGE UP (ref 1.6–2.6)
MAGNESIUM SERPL-MCNC: 1.9 MG/DL — SIGNIFICANT CHANGE UP (ref 1.6–2.6)
MANUAL SMEAR VERIFICATION: SIGNIFICANT CHANGE UP
MANUAL SMEAR VERIFICATION: SIGNIFICANT CHANGE UP
MCHC RBC-ENTMCNC: 23.6 PG — LOW (ref 27–34)
MCHC RBC-ENTMCNC: 23.6 PG — LOW (ref 27–34)
MCHC RBC-ENTMCNC: 31.7 GM/DL — LOW (ref 32–36)
MCHC RBC-ENTMCNC: 31.7 GM/DL — LOW (ref 32–36)
MCV RBC AUTO: 74.3 FL — LOW (ref 80–100)
MCV RBC AUTO: 74.3 FL — LOW (ref 80–100)
MICROCYTES BLD QL: SLIGHT — SIGNIFICANT CHANGE UP
MICROCYTES BLD QL: SLIGHT — SIGNIFICANT CHANGE UP
MONOCYTES # BLD AUTO: 0.56 K/UL — SIGNIFICANT CHANGE UP (ref 0–0.9)
MONOCYTES # BLD AUTO: 0.56 K/UL — SIGNIFICANT CHANGE UP (ref 0–0.9)
MONOCYTES NFR BLD AUTO: 4.2 % — SIGNIFICANT CHANGE UP (ref 2–14)
MONOCYTES NFR BLD AUTO: 4.2 % — SIGNIFICANT CHANGE UP (ref 2–14)
NEUTROPHILS # BLD AUTO: 10.2 K/UL — HIGH (ref 1.8–7.4)
NEUTROPHILS # BLD AUTO: 10.2 K/UL — HIGH (ref 1.8–7.4)
NEUTROPHILS NFR BLD AUTO: 76.7 % — SIGNIFICANT CHANGE UP (ref 43–77)
NEUTROPHILS NFR BLD AUTO: 76.7 % — SIGNIFICANT CHANGE UP (ref 43–77)
NRBC # BLD: 0 /100 WBCS — SIGNIFICANT CHANGE UP (ref 0–0)
NRBC # BLD: 0 /100 WBCS — SIGNIFICANT CHANGE UP (ref 0–0)
NT-PROBNP SERPL-SCNC: 1153 PG/ML — HIGH (ref 0–125)
NT-PROBNP SERPL-SCNC: 1153 PG/ML — HIGH (ref 0–125)
PLAT MORPH BLD: NORMAL — SIGNIFICANT CHANGE UP
PLAT MORPH BLD: NORMAL — SIGNIFICANT CHANGE UP
PLATELET # BLD AUTO: 252 K/UL — SIGNIFICANT CHANGE UP (ref 150–400)
PLATELET # BLD AUTO: 252 K/UL — SIGNIFICANT CHANGE UP (ref 150–400)
POLYCHROMASIA BLD QL SMEAR: SLIGHT — SIGNIFICANT CHANGE UP
POLYCHROMASIA BLD QL SMEAR: SLIGHT — SIGNIFICANT CHANGE UP
POTASSIUM SERPL-MCNC: 4 MMOL/L — SIGNIFICANT CHANGE UP (ref 3.5–5.3)
POTASSIUM SERPL-MCNC: 4 MMOL/L — SIGNIFICANT CHANGE UP (ref 3.5–5.3)
POTASSIUM SERPL-SCNC: 4 MMOL/L — SIGNIFICANT CHANGE UP (ref 3.5–5.3)
POTASSIUM SERPL-SCNC: 4 MMOL/L — SIGNIFICANT CHANGE UP (ref 3.5–5.3)
PROT SERPL-MCNC: 7.2 G/DL — SIGNIFICANT CHANGE UP (ref 6–8.3)
PROT SERPL-MCNC: 7.2 G/DL — SIGNIFICANT CHANGE UP (ref 6–8.3)
PROTHROM AB SERPL-ACNC: 21.8 SEC — HIGH (ref 9.5–13)
PROTHROM AB SERPL-ACNC: 21.8 SEC — HIGH (ref 9.5–13)
RBC # BLD: 5.73 M/UL — SIGNIFICANT CHANGE UP (ref 4.2–5.8)
RBC # BLD: 5.73 M/UL — SIGNIFICANT CHANGE UP (ref 4.2–5.8)
RBC # FLD: 16.8 % — HIGH (ref 10.3–14.5)
RBC # FLD: 16.8 % — HIGH (ref 10.3–14.5)
RBC BLD AUTO: SIGNIFICANT CHANGE UP
RBC BLD AUTO: SIGNIFICANT CHANGE UP
RSV RNA NPH QL NAA+NON-PROBE: SIGNIFICANT CHANGE UP
RSV RNA NPH QL NAA+NON-PROBE: SIGNIFICANT CHANGE UP
SARS-COV-2 RNA SPEC QL NAA+PROBE: SIGNIFICANT CHANGE UP
SARS-COV-2 RNA SPEC QL NAA+PROBE: SIGNIFICANT CHANGE UP
SODIUM SERPL-SCNC: 140 MMOL/L — SIGNIFICANT CHANGE UP (ref 135–145)
SODIUM SERPL-SCNC: 140 MMOL/L — SIGNIFICANT CHANGE UP (ref 135–145)
TROPONIN I, HIGH SENSITIVITY RESULT: 19.2 NG/L — SIGNIFICANT CHANGE UP
TROPONIN I, HIGH SENSITIVITY RESULT: 19.2 NG/L — SIGNIFICANT CHANGE UP
WBC # BLD: 13.31 K/UL — HIGH (ref 3.8–10.5)
WBC # BLD: 13.31 K/UL — HIGH (ref 3.8–10.5)
WBC # FLD AUTO: 13.31 K/UL — HIGH (ref 3.8–10.5)
WBC # FLD AUTO: 13.31 K/UL — HIGH (ref 3.8–10.5)

## 2023-12-20 PROCEDURE — 99285 EMERGENCY DEPT VISIT HI MDM: CPT

## 2023-12-20 PROCEDURE — 93010 ELECTROCARDIOGRAM REPORT: CPT

## 2023-12-20 PROCEDURE — 71045 X-RAY EXAM CHEST 1 VIEW: CPT | Mod: 26

## 2023-12-20 RX ORDER — HYDRALAZINE HCL 50 MG
10 TABLET ORAL ONCE
Refills: 0 | Status: DISCONTINUED | OUTPATIENT
Start: 2023-12-20 | End: 2023-12-20

## 2023-12-20 NOTE — ED PROVIDER NOTE - CARE PLAN
1 Principal Discharge DX:	Pneumonia  Secondary Diagnosis:	Acute respiratory failure with hypoxia   Principal Discharge DX:	Pneumonia  Secondary Diagnosis:	Acute respiratory failure with hypoxia  Secondary Diagnosis:	LEOBARDO (acute kidney injury)

## 2023-12-20 NOTE — ED PROVIDER NOTE - PHYSICAL EXAMINATION
Vital signs as available reviewed.  General:  No acute distress.  Head:  Normocephalic, atraumatic.  Eyes:  Conjunctiva pink, no icterus.  Cardiovascular:  Regular rate, no obvious murmur.  Respiratory:  Coarse bilaterally.  Abdomen:  Soft, non-tender.  Musculoskeletal:  No obvious deformity.  Neurologic: Alert and oriented, moving all extremities.  Skin:  Warm and dry.

## 2023-12-20 NOTE — ED PROVIDER NOTE - OBJECTIVE STATEMENT
73 M history CAD with stents, afib, hypertension, hyperlipidemia here complaining of heavy breathing. patient reports yesterday he had a mild cough then about 45 minutes prior to arrival patient developed difficulty breathing. patient feel like he has congestion in his head and chest. no chest pain, nausea, vomiting, dizziness, sweating, abdominal pain. no fevers, chills, sick contacts. EMS gave Nitro and ASA. PMD Tamparo. Cards Dr. Gillespie.

## 2023-12-20 NOTE — ED ADULT TRIAGE NOTE - GLASGOW COMA SCALE: EYE OPENING, MLM
Anesthesia Post-op Note    Patient: Ella Flores  Procedure(s) Performed: COLONOSCOPY  Anesthesia type: MAC    Vitals Value Taken Time   Temp 97.2 07/21/23 0835   Pulse 83 07/21/23 0833   Resp 20 07/21/23 0833   SpO2 98 % 07/21/23 0833   /77 07/21/23 0830   Vitals shown include unvalidated device data.      Patient Location: Phase II  Post-op Vital Signs:stable  Level of Consciousness: participates in exam, awake, alert and oriented  Respiratory Status: spontaneous ventilation and unassisted  Cardiovascular blood pressure returned to baseline and stable  Hydration: euvolemic  Pain Management: well controlled  Handoff: Handoff to receiving clinician was performed and questions were answered  Vomiting: none  Nausea: None  Airway Patency:patent  Post-op Assessment: awake, alert, appropriately conversant, or baseline, no complications, patient tolerated procedure well, no evidence of recall, dentition within defined limits and No Corneal Abrasion  Comments: SV, VSS, no pt complaints of N/V or pain. Report to RN.      No notable events documented.   (E4) spontaneous

## 2023-12-20 NOTE — ED ADULT NURSE NOTE - OBJECTIVE STATEMENT
Brought in by ambulance patient reports started having mild cough yesterday and 45 minutes PTA he developed difficulty of breathing. Patient received on O2 via nonrebreather mask with saturation of 96%. Noted with g20 on the left AC via EMS. Patient was given 1 nitro SL and aspirin 325mg po via EMS. Patient denies chest pain, fever/ chills, nausea/ vomiting.

## 2023-12-20 NOTE — ED PROVIDER NOTE - PROGRESS NOTE DETAILS
patient and family updated on results. spO2 87% on room air. 4lpm O2 via NC placed on patient with spO2 95%. admitter made aware.

## 2023-12-20 NOTE — ED ADULT TRIAGE NOTE - CHIEF COMPLAINT QUOTE
Patient brought by ambulance from home as reported complaining of shortness of breath with chest discomfort was given  mg; Nitro Sl.04mg; IV jimmy to left AC; on non-rebreather at 88%

## 2023-12-20 NOTE — ED ADULT NURSE NOTE - NSFALLUNIVINTERV_ED_ALL_ED
Bed/Stretcher in lowest position, wheels locked, appropriate side rails in place/Call bell, personal items and telephone in reach/Instruct patient to call for assistance before getting out of bed/chair/stretcher/Non-slip footwear applied when patient is off stretcher/[place] to call system/Physically safe environment - no spills, clutter or unnecessary equipment/Purposeful proactive rounding/Room/bathroom lighting operational, light cord in reach Bed/Stretcher in lowest position, wheels locked, appropriate side rails in place/Call bell, personal items and telephone in reach/Instruct patient to call for assistance before getting out of bed/chair/stretcher/Non-slip footwear applied when patient is off stretcher/[place] to call system/Physically safe environment - no spills, clutter or unnecessary equipment/Purposeful proactive rounding/Room/bathroom lighting operational, light cord in reach

## 2023-12-20 NOTE — ED PROVIDER NOTE - CLINICAL SUMMARY MEDICAL DECISION MAKING FREE TEXT BOX
here with a history of CAD complaining of chest congestion and difficulty breathing- check labs, XR, viral swab, re-evaluate.

## 2023-12-21 DIAGNOSIS — J96.01 ACUTE RESPIRATORY FAILURE WITH HYPOXIA: ICD-10-CM

## 2023-12-21 DIAGNOSIS — I50.89 OTHER HEART FAILURE: ICD-10-CM

## 2023-12-21 DIAGNOSIS — J18.9 PNEUMONIA, UNSPECIFIED ORGANISM: ICD-10-CM

## 2023-12-21 LAB
A1C WITH ESTIMATED AVERAGE GLUCOSE RESULT: 5.6 % — SIGNIFICANT CHANGE UP (ref 4–5.6)
A1C WITH ESTIMATED AVERAGE GLUCOSE RESULT: 5.6 % — SIGNIFICANT CHANGE UP (ref 4–5.6)
ANION GAP SERPL CALC-SCNC: 8 MMOL/L — SIGNIFICANT CHANGE UP (ref 5–17)
ANION GAP SERPL CALC-SCNC: 8 MMOL/L — SIGNIFICANT CHANGE UP (ref 5–17)
BASE EXCESS BLDA CALC-SCNC: -7.7 MMOL/L — LOW (ref -2–3)
BASE EXCESS BLDA CALC-SCNC: -7.7 MMOL/L — LOW (ref -2–3)
BLOOD GAS COMMENTS ARTERIAL: SIGNIFICANT CHANGE UP
BLOOD GAS COMMENTS ARTERIAL: SIGNIFICANT CHANGE UP
BUN SERPL-MCNC: 38 MG/DL — HIGH (ref 7–23)
BUN SERPL-MCNC: 38 MG/DL — HIGH (ref 7–23)
CALCIUM SERPL-MCNC: 8.2 MG/DL — LOW (ref 8.5–10.1)
CALCIUM SERPL-MCNC: 8.2 MG/DL — LOW (ref 8.5–10.1)
CHLORIDE SERPL-SCNC: 111 MMOL/L — HIGH (ref 96–108)
CHLORIDE SERPL-SCNC: 111 MMOL/L — HIGH (ref 96–108)
CO2 SERPL-SCNC: 20 MMOL/L — LOW (ref 22–31)
CO2 SERPL-SCNC: 20 MMOL/L — LOW (ref 22–31)
CREAT SERPL-MCNC: 2 MG/DL — HIGH (ref 0.5–1.3)
CREAT SERPL-MCNC: 2 MG/DL — HIGH (ref 0.5–1.3)
EGFR: 35 ML/MIN/1.73M2 — LOW
EGFR: 35 ML/MIN/1.73M2 — LOW
ESTIMATED AVERAGE GLUCOSE: 114 MG/DL — SIGNIFICANT CHANGE UP (ref 68–114)
ESTIMATED AVERAGE GLUCOSE: 114 MG/DL — SIGNIFICANT CHANGE UP (ref 68–114)
GAS PNL BLDA: SIGNIFICANT CHANGE UP
GAS PNL BLDA: SIGNIFICANT CHANGE UP
GLUCOSE SERPL-MCNC: 156 MG/DL — HIGH (ref 70–99)
GLUCOSE SERPL-MCNC: 156 MG/DL — HIGH (ref 70–99)
HCO3 BLDA-SCNC: 19 MMOL/L — LOW (ref 21–28)
HCO3 BLDA-SCNC: 19 MMOL/L — LOW (ref 21–28)
HCT VFR BLD CALC: 44 % — SIGNIFICANT CHANGE UP (ref 39–50)
HCT VFR BLD CALC: 44 % — SIGNIFICANT CHANGE UP (ref 39–50)
HGB BLD-MCNC: 14.3 G/DL — SIGNIFICANT CHANGE UP (ref 13–17)
HGB BLD-MCNC: 14.3 G/DL — SIGNIFICANT CHANGE UP (ref 13–17)
HOROWITZ INDEX BLDA+IHG-RTO: 100 — SIGNIFICANT CHANGE UP
HOROWITZ INDEX BLDA+IHG-RTO: 100 — SIGNIFICANT CHANGE UP
LACTATE SERPL-SCNC: 0.9 MMOL/L — SIGNIFICANT CHANGE UP (ref 0.7–2)
LACTATE SERPL-SCNC: 0.9 MMOL/L — SIGNIFICANT CHANGE UP (ref 0.7–2)
LEGIONELLA AG UR QL: NEGATIVE — SIGNIFICANT CHANGE UP
LEGIONELLA AG UR QL: NEGATIVE — SIGNIFICANT CHANGE UP
MCHC RBC-ENTMCNC: 23.8 PG — LOW (ref 27–34)
MCHC RBC-ENTMCNC: 23.8 PG — LOW (ref 27–34)
MCHC RBC-ENTMCNC: 32.5 GM/DL — SIGNIFICANT CHANGE UP (ref 32–36)
MCHC RBC-ENTMCNC: 32.5 GM/DL — SIGNIFICANT CHANGE UP (ref 32–36)
MCV RBC AUTO: 73.3 FL — LOW (ref 80–100)
MCV RBC AUTO: 73.3 FL — LOW (ref 80–100)
NRBC # BLD: 0 /100 WBCS — SIGNIFICANT CHANGE UP (ref 0–0)
NRBC # BLD: 0 /100 WBCS — SIGNIFICANT CHANGE UP (ref 0–0)
PCO2 BLDA: 37 MMHG — SIGNIFICANT CHANGE UP (ref 35–48)
PCO2 BLDA: 37 MMHG — SIGNIFICANT CHANGE UP (ref 35–48)
PH BLDA: 7.31 — LOW (ref 7.35–7.45)
PH BLDA: 7.31 — LOW (ref 7.35–7.45)
PLATELET # BLD AUTO: 257 K/UL — SIGNIFICANT CHANGE UP (ref 150–400)
PLATELET # BLD AUTO: 257 K/UL — SIGNIFICANT CHANGE UP (ref 150–400)
PO2 BLDA: 87 MMHG — SIGNIFICANT CHANGE UP (ref 83–108)
PO2 BLDA: 87 MMHG — SIGNIFICANT CHANGE UP (ref 83–108)
POTASSIUM SERPL-MCNC: 4 MMOL/L — SIGNIFICANT CHANGE UP (ref 3.5–5.3)
POTASSIUM SERPL-MCNC: 4 MMOL/L — SIGNIFICANT CHANGE UP (ref 3.5–5.3)
POTASSIUM SERPL-SCNC: 4 MMOL/L — SIGNIFICANT CHANGE UP (ref 3.5–5.3)
POTASSIUM SERPL-SCNC: 4 MMOL/L — SIGNIFICANT CHANGE UP (ref 3.5–5.3)
RAPID RVP RESULT: SIGNIFICANT CHANGE UP
RAPID RVP RESULT: SIGNIFICANT CHANGE UP
RBC # BLD: 6 M/UL — HIGH (ref 4.2–5.8)
RBC # BLD: 6 M/UL — HIGH (ref 4.2–5.8)
RBC # FLD: 17.2 % — HIGH (ref 10.3–14.5)
RBC # FLD: 17.2 % — HIGH (ref 10.3–14.5)
SAO2 % BLDA: 97.8 % — SIGNIFICANT CHANGE UP (ref 94–98)
SAO2 % BLDA: 97.8 % — SIGNIFICANT CHANGE UP (ref 94–98)
SARS-COV-2 RNA SPEC QL NAA+PROBE: SIGNIFICANT CHANGE UP
SARS-COV-2 RNA SPEC QL NAA+PROBE: SIGNIFICANT CHANGE UP
SODIUM SERPL-SCNC: 139 MMOL/L — SIGNIFICANT CHANGE UP (ref 135–145)
SODIUM SERPL-SCNC: 139 MMOL/L — SIGNIFICANT CHANGE UP (ref 135–145)
TROPONIN I, HIGH SENSITIVITY RESULT: 33.3 NG/L — SIGNIFICANT CHANGE UP
TROPONIN I, HIGH SENSITIVITY RESULT: 33.3 NG/L — SIGNIFICANT CHANGE UP
TSH SERPL-MCNC: 3.02 UIU/ML — SIGNIFICANT CHANGE UP (ref 0.36–3.74)
TSH SERPL-MCNC: 3.02 UIU/ML — SIGNIFICANT CHANGE UP (ref 0.36–3.74)
WBC # BLD: 17.69 K/UL — HIGH (ref 3.8–10.5)
WBC # BLD: 17.69 K/UL — HIGH (ref 3.8–10.5)
WBC # FLD AUTO: 17.69 K/UL — HIGH (ref 3.8–10.5)
WBC # FLD AUTO: 17.69 K/UL — HIGH (ref 3.8–10.5)

## 2023-12-21 PROCEDURE — 99233 SBSQ HOSP IP/OBS HIGH 50: CPT | Mod: GC

## 2023-12-21 PROCEDURE — 93306 TTE W/DOPPLER COMPLETE: CPT | Mod: 26

## 2023-12-21 PROCEDURE — 99222 1ST HOSP IP/OBS MODERATE 55: CPT

## 2023-12-21 PROCEDURE — 93010 ELECTROCARDIOGRAM REPORT: CPT

## 2023-12-21 PROCEDURE — 99291 CRITICAL CARE FIRST HOUR: CPT

## 2023-12-21 RX ORDER — MAGNESIUM SULFATE 500 MG/ML
2 VIAL (ML) INJECTION ONCE
Refills: 0 | Status: COMPLETED | OUTPATIENT
Start: 2023-12-21 | End: 2023-12-21

## 2023-12-21 RX ORDER — SODIUM CHLORIDE 9 MG/ML
1000 INJECTION INTRAMUSCULAR; INTRAVENOUS; SUBCUTANEOUS
Refills: 0 | Status: DISCONTINUED | OUTPATIENT
Start: 2023-12-21 | End: 2023-12-21

## 2023-12-21 RX ORDER — ACETAMINOPHEN 500 MG
1000 TABLET ORAL ONCE
Refills: 0 | Status: COMPLETED | OUTPATIENT
Start: 2023-12-21 | End: 2023-12-21

## 2023-12-21 RX ORDER — ATORVASTATIN CALCIUM 80 MG/1
1 TABLET, FILM COATED ORAL
Qty: 0 | Refills: 0 | DISCHARGE

## 2023-12-21 RX ORDER — ASPIRIN/CALCIUM CARB/MAGNESIUM 324 MG
81 TABLET ORAL DAILY
Refills: 0 | Status: DISCONTINUED | OUTPATIENT
Start: 2023-12-21 | End: 2023-12-24

## 2023-12-21 RX ORDER — RIVAROXABAN 15 MG-20MG
15 KIT ORAL
Refills: 0 | Status: DISCONTINUED | OUTPATIENT
Start: 2023-12-21 | End: 2023-12-24

## 2023-12-21 RX ORDER — FOLIC ACID 0.8 MG
1 TABLET ORAL DAILY
Refills: 0 | Status: DISCONTINUED | OUTPATIENT
Start: 2023-12-21 | End: 2023-12-24

## 2023-12-21 RX ORDER — IPRATROPIUM/ALBUTEROL SULFATE 18-103MCG
3 AEROSOL WITH ADAPTER (GRAM) INHALATION ONCE
Refills: 0 | Status: COMPLETED | OUTPATIENT
Start: 2023-12-21 | End: 2023-12-21

## 2023-12-21 RX ORDER — AZITHROMYCIN 500 MG/1
500 TABLET, FILM COATED ORAL EVERY 24 HOURS
Refills: 0 | Status: DISCONTINUED | OUTPATIENT
Start: 2023-12-22 | End: 2023-12-21

## 2023-12-21 RX ORDER — ATENOLOL 25 MG/1
50 TABLET ORAL DAILY
Refills: 0 | Status: DISCONTINUED | OUTPATIENT
Start: 2023-12-21 | End: 2023-12-22

## 2023-12-21 RX ORDER — AZITHROMYCIN 500 MG/1
500 TABLET, FILM COATED ORAL ONCE
Refills: 0 | Status: COMPLETED | OUTPATIENT
Start: 2023-12-21 | End: 2023-12-21

## 2023-12-21 RX ORDER — CEFTRIAXONE 500 MG/1
1000 INJECTION, POWDER, FOR SOLUTION INTRAMUSCULAR; INTRAVENOUS ONCE
Refills: 0 | Status: COMPLETED | OUTPATIENT
Start: 2023-12-21 | End: 2023-12-21

## 2023-12-21 RX ORDER — ACETAMINOPHEN 500 MG
650 TABLET ORAL ONCE
Refills: 0 | Status: COMPLETED | OUTPATIENT
Start: 2023-12-21 | End: 2023-12-21

## 2023-12-21 RX ORDER — INFLUENZA VIRUS VACCINE 15; 15; 15; 15 UG/.5ML; UG/.5ML; UG/.5ML; UG/.5ML
0.7 SUSPENSION INTRAMUSCULAR ONCE
Refills: 0 | Status: DISCONTINUED | OUTPATIENT
Start: 2023-12-21 | End: 2023-12-24

## 2023-12-21 RX ORDER — CEFTRIAXONE 500 MG/1
1000 INJECTION, POWDER, FOR SOLUTION INTRAMUSCULAR; INTRAVENOUS EVERY 24 HOURS
Refills: 0 | Status: DISCONTINUED | OUTPATIENT
Start: 2023-12-22 | End: 2023-12-21

## 2023-12-21 RX ORDER — DOCUSATE SODIUM 100 MG
0 CAPSULE ORAL
Qty: 0 | Refills: 0 | DISCHARGE

## 2023-12-21 RX ORDER — RIVAROXABAN 15 MG-20MG
0 KIT ORAL
Qty: 0 | Refills: 0 | DISCHARGE

## 2023-12-21 RX ORDER — AMLODIPINE BESYLATE 2.5 MG/1
10 TABLET ORAL DAILY
Refills: 0 | Status: DISCONTINUED | OUTPATIENT
Start: 2023-12-21 | End: 2023-12-24

## 2023-12-21 RX ORDER — HYDRALAZINE HCL 50 MG
25 TABLET ORAL THREE TIMES A DAY
Refills: 0 | Status: DISCONTINUED | OUTPATIENT
Start: 2023-12-21 | End: 2023-12-24

## 2023-12-21 RX ORDER — FUROSEMIDE 40 MG
80 TABLET ORAL DAILY
Refills: 0 | Status: DISCONTINUED | OUTPATIENT
Start: 2023-12-21 | End: 2023-12-23

## 2023-12-21 RX ORDER — ATORVASTATIN CALCIUM 80 MG/1
20 TABLET, FILM COATED ORAL AT BEDTIME
Refills: 0 | Status: DISCONTINUED | OUTPATIENT
Start: 2023-12-21 | End: 2023-12-22

## 2023-12-21 RX ORDER — FOLIC ACID 0.8 MG
1 TABLET ORAL
Qty: 0 | Refills: 0 | DISCHARGE

## 2023-12-21 RX ORDER — SODIUM CHLORIDE 9 MG/ML
500 INJECTION INTRAMUSCULAR; INTRAVENOUS; SUBCUTANEOUS ONCE
Refills: 0 | Status: COMPLETED | OUTPATIENT
Start: 2023-12-21 | End: 2023-12-21

## 2023-12-21 RX ORDER — NITROGLYCERIN 6.5 MG
5 CAPSULE, EXTENDED RELEASE ORAL
Qty: 50 | Refills: 0 | Status: DISCONTINUED | OUTPATIENT
Start: 2023-12-21 | End: 2023-12-21

## 2023-12-21 RX ADMIN — Medication 3 MILLILITER(S): at 03:13

## 2023-12-21 RX ADMIN — Medication 1 MILLIGRAM(S): at 11:32

## 2023-12-21 RX ADMIN — AZITHROMYCIN 255 MILLIGRAM(S): 500 TABLET, FILM COATED ORAL at 02:00

## 2023-12-21 RX ADMIN — RIVAROXABAN 15 MILLIGRAM(S): KIT at 18:56

## 2023-12-21 RX ADMIN — Medication 80 MILLIGRAM(S): at 03:12

## 2023-12-21 RX ADMIN — Medication 650 MILLIGRAM(S): at 18:56

## 2023-12-21 RX ADMIN — Medication 1000 MILLIGRAM(S): at 02:05

## 2023-12-21 RX ADMIN — Medication 1000 MILLIGRAM(S): at 01:28

## 2023-12-21 RX ADMIN — ATORVASTATIN CALCIUM 20 MILLIGRAM(S): 80 TABLET, FILM COATED ORAL at 21:45

## 2023-12-21 RX ADMIN — Medication 400 MILLIGRAM(S): at 01:10

## 2023-12-21 RX ADMIN — CEFTRIAXONE 100 MILLIGRAM(S): 500 INJECTION, POWDER, FOR SOLUTION INTRAMUSCULAR; INTRAVENOUS at 01:30

## 2023-12-21 RX ADMIN — Medication 25 MILLIGRAM(S): at 21:45

## 2023-12-21 RX ADMIN — Medication 1.5 MICROGRAM(S)/MIN: at 04:35

## 2023-12-21 RX ADMIN — Medication 81 MILLIGRAM(S): at 11:32

## 2023-12-21 RX ADMIN — CEFTRIAXONE 1000 MILLIGRAM(S): 500 INJECTION, POWDER, FOR SOLUTION INTRAMUSCULAR; INTRAVENOUS at 01:58

## 2023-12-21 RX ADMIN — Medication 650 MILLIGRAM(S): at 19:30

## 2023-12-21 RX ADMIN — Medication 25 GRAM(S): at 05:26

## 2023-12-21 RX ADMIN — SODIUM CHLORIDE 500 MILLILITER(S): 9 INJECTION INTRAMUSCULAR; INTRAVENOUS; SUBCUTANEOUS at 01:11

## 2023-12-21 RX ADMIN — Medication 25 MILLIGRAM(S): at 13:41

## 2023-12-21 RX ADMIN — Medication 3 MICROGRAM(S)/MIN: at 03:58

## 2023-12-21 NOTE — H&P ADULT - NSHPLABSRESULTS_GEN_ALL_CORE
12-21    139  |  111<H>  |  38<H>  ----------------------------<  156<H>  4.0   |  20<L>  |  2.00<H>    Ca    8.2<L>      21 Dec 2023 04:28  Mg     1.9     12-20    TPro  7.2  /  Alb  3.6  /  TBili  0.5  /  DBili  x   /  AST  18  /  ALT  33  /  AlkPhos  115  12-20                            14.3   17.69 )-----------( 257      ( 21 Dec 2023 09:45 )             44.0             LIVER FUNCTIONS - ( 20 Dec 2023 22:50 )  Alb: 3.6 g/dL / Pro: 7.2 g/dL / ALK PHOS: 115 U/L / ALT: 33 U/L / AST: 18 U/L / GGT: x             PT/INR - ( 20 Dec 2023 22:50 )   PT: 21.8 sec;   INR: 1.90 ratio         PTT - ( 20 Dec 2023 22:50 )  PTT:40.7 sec    Urinalysis Basic - ( 21 Dec 2023 04:28 )    Color: x / Appearance: x / SG: x / pH: x  Gluc: 156 mg/dL / Ketone: x  / Bili: x / Urobili: x   Blood: x / Protein: x / Nitrite: x   Leuk Esterase: x / RBC: x / WBC x   Sq Epi: x / Non Sq Epi: x / Bacteria: x

## 2023-12-21 NOTE — CHART NOTE - NSCHARTNOTEFT_GEN_A_CORE
Resident Rapid Response Note    Rapid response was called at 315am for hypoxia.    Events leading up to Rapid Response: Patient became acutely short of breath, stating that he cannot breathe    Patient was seen and examined at the bedside by the rapid response team. ICU PA at bedside.    Rapid Response Vital Signs:  BP: 213/106  HR: 95  RR: 40  SpO2: 71% on 100 nrb             Physical Exam:  Gen: moderate distress, increased wob  HEENT: NCAT, EOMI b/l  Cardio: regular rate and rhythm, +s1s2, no murmurs, rubs, or gallops  Pulm: crackles b/l  Abdomen: soft, nontender, nondistended, +BS x4 quadrants, no guarding  Extremities: no cyanosis or edema, +2 pedal pulses  Neuro: AAOx3, CNII-XII grossly intact, 5/5 strength all extremities, sensation intact  Skin: warm and dry        Assessment/Plan:  73 M history CAD with stents, afib, hypertension, hyperlipidemia here complaining of heavy breathing admitted for pna, rrt for increased wob, hypoxia; suspect flash pulm edema, however patient did only receive 500cc bolus + 75cchr ns.    - started on bipap with significant improvement in symptoms; o2sat improvement to mid 90s.   - 80 lasix ivp x1  - ekg reviewed, will recheck another one  - bedside tte performed by ICU PA does not demonstrate any R heart strain.  - PE unlikely given that patient is compliant with xarelto  - will check another trop  - abg  - rvp  - tte  - continue abx for pna seen on cxr, f/u legionella ag ur    -RN to call if any changes.  - relayed to dr perlman via TEAMS  -Family updated by Dr. Wilkinson

## 2023-12-21 NOTE — CONSULT NOTE ADULT - ASSESSMENT
Impression:  1. acute hypoxemic respiratory failure  2. CAD sp ANAHI suspect acute decompensated heart failure  3. acute pulmonary edema  4. HTN urgency  5. LEOBARDO on CKD    Plan:  Transfer to ICU. Case and plan discussed with eICU attending Dr Salazar.    Neuro - nonfocal, following commands    CV -  BP elevated with signs of APE/HTN urgency ?          IV lasix 80mg x 1 given          started IV NTG infusion to unload and for acute control of BP          APT with ASA          trop initially negative, EKG without changes from preop          POCUS +B-lines bilaterally, RV not dilated or HK, LV ? mild suppression          check repeat trop, lactate currently normal          check 2D echo          hx AF in NSR, keep K>4 and Mg> 2 for optimal arrhythmia suppression          Cardio consult    Pulm -  NIPPV support with higher level EPAP for alveolar recruitment             ABG shows uncompensated MA             actively titrating FIO2 for sats>90%             diuresed with lasix, goal for net negative fluid balance             CXR appears more like PVC than infiltrates             RVP negative             covering empirically with abx (Ctx and Azithro) given leukocytosis, check procal             if stabilizes with improved oxygenation will get CT chest to better delineate     GI -  NPO for now while on NIPPV    Renal - Cr elevated, baseline ? 1.5, strict I/Os, avoid MAP<65, renally adjust all meds, avoid nephrotoxins, repeat BMP in am.    Heme - H/h stable, no signs of bleeding, cont home xeralto adjusted for CrCl, SCDs    ID - afebrile, + leukocytosis, covering empirically for potential CAP with Ctx and Azithro, RVP negative, BCx/procal sent, trend temp curve and WBC, CT when stable to evaluate underlying pathology.      Endo -  BS stable.

## 2023-12-21 NOTE — H&P ADULT - NSHPPHYSICALEXAM_GEN_ALL_CORE
· Physical Examination: Vital signs as available reviewed.  	General:  No acute distress.  	Head:  Normocephalic, atraumatic.  	Eyes:  Conjunctiva pink, no icterus.  	Cardiovascular:  Regular rate, no obvious murmur.  	Respiratory:  Coarse bilaterally.  	Abdomen:  Soft, non-tender.  	Musculoskeletal:  No obvious deformity.  	Neurologic: Alert and oriented, moving all extremities.  Skin:  Warm and dry.

## 2023-12-21 NOTE — CONSULT NOTE ADULT - ASSESSMENT
73M with PMHx CAD sp PCI/ANAHI x 2 to LAD/Ramus 2016, MI 2016, HTN, HLD, AF on AC, CKD, stress echo 2018 EF 59%, who presented with SOB, JORDAN and congestion which has worsened in last day. Found to have acute hypoxic respiratory failure 2/2 acute decompensated heart failure. CXR appears more consistent with CHF than pneumonia. No fever but WBC increased to 17 today.    #Acute hypoxic respiratory failure  #Leukocytosis    -observe off closely antibiotics, unless any fever or signs of clinical worsening  -follow urine legionella and pneumococcal antigens  -follow blood cultures  -suggest MRSA nasal PCR  -monitor WBC    Thank you for courtesy of this consult.     Will follow.  Discussed with Dr. Akilah Sanabria MD  Division of Infectious Diseases   Cell [phone] between 8am and 6pm   After 6pm and weekends please call ID service at [phone].     55 minutes spent on total encounter assessing patient, examination, chart review, counseling and coordinating care by the attending physician/nurse/care manager.  73M with PMHx CAD sp PCI/ANAHI x 2 to LAD/Ramus 2016, MI 2016, HTN, HLD, AF on AC, CKD, stress echo 2018 EF 59%, who presented with SOB, OJRDAN and congestion which has worsened in last day. Found to have acute hypoxic respiratory failure 2/2 acute decompensated heart failure. CXR appears more consistent with CHF than pneumonia. No fever but WBC increased to 17 today.    #Acute hypoxic respiratory failure  #Leukocytosis    -observe off closely antibiotics, unless any fever or signs of clinical worsening  -follow urine legionella and pneumococcal antigens  -follow blood cultures  -suggest MRSA nasal PCR  -monitor WBC    Thank you for courtesy of this consult.     Will follow.  Discussed with Dr. Akilah Sanabria MD  Division of Infectious Diseases   Cell [phone] between 8am and 6pm   After 6pm and weekends please call ID service at [phone].     55 minutes spent on total encounter assessing patient, examination, chart review, counseling and coordinating care by the attending physician/nurse/care manager.

## 2023-12-21 NOTE — CONSULT NOTE ADULT - ASSESSMENT
LEOBARDO LEOBARDO on CKD 3  Metabolic Acidosis  HTN Urgency  Dyspnea    -Baseline Creatinine 1.5 LEOBARDO on CKD 3  Metabolic Acidosis  HTN Urgency  Dyspnea  CHF    -Baseline Creatinine 1.5  -LEOBARDO likely 2/2 to hypoxic injury  -Check urine lytes  -Check UA  -Will need to tolerate higher creatinine  to cont lasix to achieve euvolemia  -No emergent indication for RRT  -No indication for bicarb at this time and will avoid salt load  -Diuretic will also help improve BP

## 2023-12-21 NOTE — PROVIDER CONTACT NOTE (OTHER) - SITUATION
Patient reports he is having difficulty breathing on 5L O2 NC. pt remains on continuous pulse ox and destatted to 88% on 5L O2. [facility]. Patient reports he is having difficulty breathing on 5L O2 NC. pt remains on continuous pulse ox and destatted to 88% on 5L O2. [facility]

## 2023-12-21 NOTE — PROGRESS NOTE ADULT - SUBJECTIVE AND OBJECTIVE BOX
Interval Events: Pt presented to the hospital last night c/o SOB, JORDAN, and congestion and was found to be hypoxic. RRT was called for worsening SOB, tachycardia, and desaturation to 80s on NRB. Pt was found to be in HTN urgency with SBP >190, was placed on BiPAP and started on a nitro gtt, given Lasix IV. Pt was transferred to ICU for further management. Today pt had improvement in saturation and was able to be taken off BiPAP. Now saturating well on NC. Seen and examined at bedside, BP with improvement, nitro gtt off. Pt denies any complaints at this time, however pt appears apprehensive.    Review of Systems:  Constitutional: No fever, chills, fatigue  Neuro: No headache, numbness, weakness  Resp: No cough, wheezing, shortness of breath  CVS: No chest pain, palpitations, leg swelling  GI: No abdominal pain, nausea, vomiting, diarrhea     ICU Vital Signs Last 24 Hrs  T(C): 36.6 (21 Dec 2023 11:41), Max: 37.2 (21 Dec 2023 00:41)  T(F): 97.8 (21 Dec 2023 11:41), Max: 98.9 (21 Dec 2023 00:41)  HR: 66 (21 Dec 2023 12:10) (54 - 84)  BP: 149/74 (21 Dec 2023 11:00) (101/57 - 194/114)  BP(mean): 105 (21 Dec 2023 11:00) (79 - 121)  ABP: --  ABP(mean): --  RR: 21 (21 Dec 2023 11:00) (16 - 23)  SpO2: 96% (21 Dec 2023 12:10) (88% - 98%)    O2 Parameters below as of 21 Dec 2023 06:00  Patient On (Oxygen Delivery Method): BiPAP/CPAP              12-20-23 @ 07:01  -  12-21-23 @ 07:00  --------------------------------------------------------  IN: 3 mL / OUT: 200 mL / NET: -197 mL    12-21-23 @ 07:01  -  12-21-23 @ 14:02  --------------------------------------------------------  IN: 6 mL / OUT: 0 mL / NET: 6 mL        CAPILLARY BLOOD GLUCOSE          I&O's Summary    20 Dec 2023 07:01  -  21 Dec 2023 07:00  --------------------------------------------------------  IN: 3 mL / OUT: 200 mL / NET: -197 mL    21 Dec 2023 07:01  -  21 Dec 2023 14:02  --------------------------------------------------------  IN: 6 mL / OUT: 0 mL / NET: 6 mL        Physical Exam:   Gen: resting comfortably seated up in bed  Neuro: answers questions and follows commands appropriately  HEENT: NCAT, sclera clear, EOMI  Resp: minimal crackles R>L on auscultation  CVS: S1S2, RRR no MRG  Abd: soft, non-tender, non-distended  Ext: WWP, no LE edema    Meds:    amLODIPine   Tablet Oral  atenolol  Tablet Oral  furosemide   Injectable IV Push  hydrALAZINE Oral  nitroglycerin  Infusion IV Continuous    atorvastatin Oral          aspirin enteric coated Oral  rivaroxaban Oral        folic acid Oral    influenza  Vaccine (HIGH DOSE) IntraMuscular                                14.3   17.69 )-----------( 257      ( 21 Dec 2023 09:45 )             44.0       12-21    139  |  111<H>  |  38<H>  ----------------------------<  156<H>  4.0   |  20<L>  |  2.00<H>    Ca    8.2<L>      21 Dec 2023 04:28  Mg     1.9     12-20    TPro  7.2  /  Alb  3.6  /  TBili  0.5  /  DBili  x   /  AST  18  /  ALT  33  /  AlkPhos  115  12-20    Lactate 0.9           12-21 @ 00:50          PT/INR - ( 20 Dec 2023 22:50 )   PT: 21.8 sec;   INR: 1.90 ratio         PTT - ( 20 Dec 2023 22:50 )  PTT:40.7 sec  Urinalysis Basic - ( 21 Dec 2023 04:28 )    Color: x / Appearance: x / SG: x / pH: x  Gluc: 156 mg/dL / Ketone: x  / Bili: x / Urobili: x   Blood: x / Protein: x / Nitrite: x   Leuk Esterase: x / RBC: x / WBC x   Sq Epi: x / Non Sq Epi: x / Bacteria: x          Rapid RVP Result: NotDetec (12-20 @ 22:50)        Tubes/Lines:  None    GLOBAL ISSUE/BEST PRACTICE:  Analgesia: N  Sedation: N  HOB elevation: Y  Stress ulcer prophylaxis: N  VTE prophylaxis: Xarelto  Glycemic control: N  Nutrition: Regular diet    CODE STATUS: FULL CODE       Interval Events: Pt presented to the hospital last night c/o SOB, JORDAN, and congestion and was found to be hypoxic, admitted for pneumonia v pulm edema. RRT was called for worsening SOB, tachycardia, and desaturation to 80s on NRB. Pt was found to be in HTN urgency with SBP >190, was placed on BiPAP and started on a nitro gtt, given Lasix IV. Pt was transferred to ICU for further management. Today pt had improvement in saturation and was able to be taken off BiPAP. Now saturating well on NC. Seen and examined at bedside, BP with improvement, nitro gtt off. Pt denies any complaints at this time, however pt appears apprehensive.    Review of Systems:  Constitutional: No fever, chills, fatigue  Neuro: No headache, numbness, weakness  Resp: No cough, wheezing, shortness of breath  CVS: No chest pain, palpitations, leg swelling  GI: No abdominal pain, nausea, vomiting, diarrhea     ICU Vital Signs Last 24 Hrs  T(C): 36.6 (21 Dec 2023 11:41), Max: 37.2 (21 Dec 2023 00:41)  T(F): 97.8 (21 Dec 2023 11:41), Max: 98.9 (21 Dec 2023 00:41)  HR: 66 (21 Dec 2023 12:10) (54 - 84)  BP: 149/74 (21 Dec 2023 11:00) (101/57 - 194/114)  BP(mean): 105 (21 Dec 2023 11:00) (79 - 121)  ABP: --  ABP(mean): --  RR: 21 (21 Dec 2023 11:00) (16 - 23)  SpO2: 96% (21 Dec 2023 12:10) (88% - 98%)    O2 Parameters below as of 21 Dec 2023 06:00  Patient On (Oxygen Delivery Method): BiPAP/CPAP              12-20-23 @ 07:01  -  12-21-23 @ 07:00  --------------------------------------------------------  IN: 3 mL / OUT: 200 mL / NET: -197 mL    12-21-23 @ 07:01  -  12-21-23 @ 14:02  --------------------------------------------------------  IN: 6 mL / OUT: 0 mL / NET: 6 mL        CAPILLARY BLOOD GLUCOSE          I&O's Summary    20 Dec 2023 07:01  -  21 Dec 2023 07:00  --------------------------------------------------------  IN: 3 mL / OUT: 200 mL / NET: -197 mL    21 Dec 2023 07:01  -  21 Dec 2023 14:02  --------------------------------------------------------  IN: 6 mL / OUT: 0 mL / NET: 6 mL        Physical Exam:   Gen: resting comfortably seated up in bed  Neuro: answers questions and follows commands appropriately  HEENT: NCAT, sclera clear, EOMI  Resp: minimal crackles R>L on auscultation  CVS: S1S2, RRR no MRG  Abd: soft, non-tender, non-distended  Ext: WWP, no LE edema    Meds:    amLODIPine   Tablet Oral  atenolol  Tablet Oral  furosemide   Injectable IV Push  hydrALAZINE Oral  nitroglycerin  Infusion IV Continuous    atorvastatin Oral          aspirin enteric coated Oral  rivaroxaban Oral        folic acid Oral    influenza  Vaccine (HIGH DOSE) IntraMuscular                                14.3   17.69 )-----------( 257      ( 21 Dec 2023 09:45 )             44.0       12-21    139  |  111<H>  |  38<H>  ----------------------------<  156<H>  4.0   |  20<L>  |  2.00<H>    Ca    8.2<L>      21 Dec 2023 04:28  Mg     1.9     12-20    TPro  7.2  /  Alb  3.6  /  TBili  0.5  /  DBili  x   /  AST  18  /  ALT  33  /  AlkPhos  115  12-20    Lactate 0.9           12-21 @ 00:50          PT/INR - ( 20 Dec 2023 22:50 )   PT: 21.8 sec;   INR: 1.90 ratio         PTT - ( 20 Dec 2023 22:50 )  PTT:40.7 sec  Urinalysis Basic - ( 21 Dec 2023 04:28 )    Color: x / Appearance: x / SG: x / pH: x  Gluc: 156 mg/dL / Ketone: x  / Bili: x / Urobili: x   Blood: x / Protein: x / Nitrite: x   Leuk Esterase: x / RBC: x / WBC x   Sq Epi: x / Non Sq Epi: x / Bacteria: x          Rapid RVP Result: NotDetec (12-20 @ 22:50)        Tubes/Lines:  None    GLOBAL ISSUE/BEST PRACTICE:  Analgesia: N  Sedation: N  HOB elevation: Y  Stress ulcer prophylaxis: N  VTE prophylaxis: Xarelto  Glycemic control: N  Nutrition: Regular diet    CODE STATUS: FULL CODE

## 2023-12-21 NOTE — PROVIDER CONTACT NOTE (EICU) - SITUATION
73 M history CAD with stents, afib, hypertension, hyperlipidemia here complaining of heavy breathing. patient reports yesterday he had a mild cough then about 45 minutes prior to arrival patient developed difficulty breathing. patient feel like he has congestion in his head and chest.  Case discussed with the ICU PA.

## 2023-12-21 NOTE — H&P ADULT - HISTORY OF PRESENT ILLNESS
73 M history CAD with stents, afib, hypertension, hyperlipidemia here complaining of heavy breathing. patient reports yesterday he had a mild cough then about 45 minutes prior to arrival patient developed difficulty breathing. patient feel like he has congestion in his head and chest. no chest pain, nausea, vomiting, dizziness, sweating, abdominal pain. no fevers, chills, sick contacts. EMS gave Nitro and ASA. PMD Tamparo. Cards Dr. Gillespie  In ER patient found to be in respiratory failure  patient is being admitted for further work up and treatment

## 2023-12-21 NOTE — PHARMACOTHERAPY INTERVENTION NOTE - COMMENTS
Patient is a 73 year old male currently admitted to the ICU. Medication reconcilliation completed with wife Junie ([phone]) and [facility] mail order pharmacy. OMR updated and findings relayed to Dr. Isbell.  Patient is a 73 year old male currently admitted to the ICU. Medication reconcilliation completed with wife Junie ([phone]) and [facility] mail order pharmacy. OMR updated and findings relayed to Dr. Isbell.

## 2023-12-21 NOTE — CONSULT NOTE ADULT - ASSESSMENT
Patient is a 73yoM with a PMH of CAD sp PCI/ANAHI x 2 to LAD/Ramus 2016, MI 2016, HTN, HLD, AF on AC, CKD, stress echo 2018 EF 59% who presents to ED BIBEMS with SOB, JORDAN and congestion which has worsened in last day.  - No clear evidence of acute ischemia, trops negative x 2.   - EKbpm, NSR --> 71bpm, NSR, left axis deviation    - No acute changes on EKG compared to previous.  - Continue ASA, statin   - Monitor closely for the development of anginal symptoms or clinical signs of ischemia.    - Patient is non edematous but has b/l crackles on exam  - Cxray significant for b/l infiltrates --> PNA vs pulmonary edema   - TTE 2018: EF 59%  - F/u repeat TTE   - Continue IV Lasix 80mg daily   - Continue to keep net negative daily   - Monitor and replete lytes, keep K>4, Mg>2.  - Strict I/Os, daily weights.    - BP improving  - Wean down Nitro gtt   - Can start Hydralazine 25mg TID while weaning off nitro gtt   - Restart home Amlodipine 10mg and Atenolol 50mg BID  - Continue to monitor routine hemodynamics      - Other cardiovascular workup will depend on clinical course.  - All other workup per primary team.  - Will continue to follow.

## 2023-12-21 NOTE — CONSULT NOTE ADULT - ATTENDING COMMENTS
Patient is a 73yoM with a PMH of CAD sp PCI/ANAHI x 2 to LAD/Ramus 2016, MI 2016, HTN, HLD, AF on AC, CKD, stress echo 2018 EF 59% who presents to ED BIBEMS with SOB, JORDAN and congestion which has worsened in last day.    - Continue ASA, statin   - Patient is non edematous but has b/l crackles on exam  - Cxray significant for b/l infiltrates --> PNA vs pulmonary edema   - TTE 2018: EF 59%  - F/u repeat TTE   - Continue IV Lasix 80mg daily   - Continue to keep net negative daily   - BP improving  - Wean down Nitro gtt   - Can start Hydralazine 25mg TID while weaning off nitro gtt if atenolol and amlodipine do not control the pressure  - Restart home Amlodipine 10mg and Atenolol 50mg BID

## 2023-12-21 NOTE — CONSULT NOTE ADULT - SUBJECTIVE AND OBJECTIVE BOX
Patient is a 73y old  Male who presents with a chief complaint of     BRIEF HOSPITAL COURSE:   73M with PMHx CAD sp PCI/ANAHI x 2 to LAD/Ramus 2016, MI 2016, HTN, HLD, AF on AC, CKD, stress echo 2018 EF 59% who presents to ED BIBEMS with SOB, JORDAN and congestion which has worsened in last day. Pt denies CP, fever, sputum production, N/V, abd pain. Upon arrival to ED pt noted to be hypoxic, placed on NRB. /114 CXR with bilateral infiltrates felt to be ? PNA. Pt was treated with IV, received 500ml IVF bolus. Admitted to medical team.    Events last 24 hours: Overnight RRT called for pt worsened SOB with desaturation to 80s on NRB, tachypnic, labored, pulling off mask. Upon arrival pt being ambued by ED staff, SBP>190, sats 85%. Immediately placed on NIPPV support with improvement in sats to mid 90s and calming of labored breathing, IV lasix 80mg x 1 given, SBP down to 160-170.   Pt denied CP.     PAST MEDICAL & SURGICAL HISTORY:  Essential hypertension      Gastroesophageal reflux disease, esophagitis presence not specified      HTN (hypertension)      Hyperlipidemia      CAD (coronary artery disease)  stents x 2      CAD S/P percutaneous coronary angioplasty  with ANAHI          Review of Systems:  12 pt ROS negative unless otherwise stated above    Medications:  azithromycin  IVPB 500 milliGRAM(s) IV Intermittent every 24 hours  cefTRIAXone   IVPB 1000 milliGRAM(s) IV Intermittent every 24 hours    atenolol  Tablet 50 milliGRAM(s) Oral daily  furosemide   Injectable 80 milliGRAM(s) IV Push daily  nitroglycerin  Infusion 5 MICROgram(s)/Min IV Continuous <Continuous>          aspirin enteric coated 81 milliGRAM(s) Oral daily        atorvastatin 20 milliGRAM(s) Oral at bedtime    folic acid 1 milliGRAM(s) Oral daily      ICU Vital Signs Last 24 Hrs  T(C): 36.9 (21 Dec 2023 02:48), Max: 37.2 (21 Dec 2023 00:41)  T(F): 98.5 (21 Dec 2023 02:48), Max: 98.9 (21 Dec 2023 00:41)  HR: 60 (21 Dec 2023 04:30) (60 - 84)  BP: 105/63 (21 Dec 2023 04:30) (105/63 - 194/114)  BP(mean): --  ABP: --  ABP(mean): --  RR: 16 (21 Dec 2023 04:30) (16 - 20)  SpO2: 98% (21 Dec 2023 04:30) (88% - 98%)    O2 Parameters below as of 21 Dec 2023 04:30  Patient On (Oxygen Delivery Method): BiPAP/CPAP            ABG - ( 21 Dec 2023 03:35 )  pH, Arterial: 7.31  pH, Blood: x     /  pCO2: 37    /  pO2: 87    / HCO3: 19    / Base Excess: -7.7  /  SaO2: 97.8        I&O's Summary        LABS:                        13.5   13.31 )-----------( 252      ( 20 Dec 2023 22:50 )             42.6     12-20    140  |  112<H>  |  39<H>  ----------------------------<  134<H>  4.0   |  25  |  2.00<H>    Ca    8.5      20 Dec 2023 22:50  Mg     1.9     12-20    TPro  7.2  /  Alb  3.6  /  TBili  0.5  /  DBili  x   /  AST  18  /  ALT  33  /  AlkPhos  115  12-20          CAPILLARY BLOOD GLUCOSE        PT/INR - ( 20 Dec 2023 22:50 )   PT: 21.8 sec;   INR: 1.90 ratio         PTT - ( 20 Dec 2023 22:50 )  PTT:40.7 sec  Urinalysis Basic - ( 20 Dec 2023 22:50 )    Color: x / Appearance: x / SG: x / pH: x  Gluc: 134 mg/dL / Ketone: x  / Bili: x / Urobili: x   Blood: x / Protein: x / Nitrite: x   Leuk Esterase: x / RBC: x / WBC x   Sq Epi: x / Non Sq Epi: x / Bacteria: x      CULTURES:      Physical Examination at time of rapid:    General: .  Alert but agitated, asking for help, sats in 80s    HEENT: Pupils equal, reactive to light.  Symmetric.    PULM: rales noted 1/2 way up from bases bilaterally    CVS: Regular rate and rhythm    ABD: Soft, nondistended, nontender, normoactive bowel sounds, no rebound or guarding    EXT: trace pretibial edema, nontender    SKIN: cool at feet, warm proximal, no rashes noted.    RADIOLOGY:   CXR bilateral interstitial infiltrates, on effusion/pts    EKG: NSR, no acute ST-T wave changes when compared to prior outpt records    CRITICAL CARE TIME SPENT: 55 mins assessing presenting problems of acute illness that poses high probability of life threatening deterioration or end organ damage/dysfunction.  Medical decision making including Initiating plan of care, reviewing data, reviewing radiology, direct patient bedside evaluation and interpretation of vital signs, any necessary ventilator management , discussion with multidisciplinary team, all non inclusive of procedure. Date of entry of this note is equal to date of services rendered.

## 2023-12-21 NOTE — CONSULT NOTE ADULT - SUBJECTIVE AND OBJECTIVE BOX
Patient is a 73y old  Male who presents with a chief complaint of SOB     HPI: Patient is a 73yoM with a PMH of CAD sp PCI/ANAHI x 2 to LAD/Ramus 2016, MI 2016, HTN, HLD, AF on AC, CKD, stress echo 2018 EF 59% who presents to ED BIBEMS with SOB, JORDAN and congestion which has worsened in last day. Cxray was done and showed b/l infiltrates, thought to be PNA. He was started on antibiotics and given 500cc IVF. Overnight a rapid response was called on the patient for increasing SOB, hypoxia (SpO2 71% on 100% nrb) and hypertensive urgency. Patient was started on BiPap, given IV Lasix 80mg x1 and started on a Nitro gtt. Patients BP improved and his O2 sats increased to the 90's. A bedside POCUS was done and showed +B-lines but no evidence of right heart strain. He was subsequently transferred to the ICU for suspected flash pulmonary edema.       PAST MEDICAL & SURGICAL HISTORY:  Essential hypertension      Gastroesophageal reflux disease, esophagitis presence not specified      HTN (hypertension)      Hyperlipidemia      CAD (coronary artery disease)  stents x 2      CAD S/P percutaneous coronary angioplasty  with ANAHI                ECHO 2018  FINDINGS: EF 59%      MEDICATIONS  (STANDING):  aspirin enteric coated 81 milliGRAM(s) Oral daily  atenolol  Tablet 50 milliGRAM(s) Oral daily  atorvastatin 20 milliGRAM(s) Oral at bedtime  azithromycin  IVPB 500 milliGRAM(s) IV Intermittent every 24 hours  cefTRIAXone   IVPB 1000 milliGRAM(s) IV Intermittent every 24 hours  folic acid 1 milliGRAM(s) Oral daily  furosemide   Injectable 80 milliGRAM(s) IV Push daily  influenza  Vaccine (HIGH DOSE) 0.7 milliLiter(s) IntraMuscular once  nitroglycerin  Infusion 5 MICROgram(s)/Min (1.5 mL/Hr) IV Continuous <Continuous>  rivaroxaban 15 milliGRAM(s) Oral with dinner    MEDICATIONS  (PRN):      FAMILY HISTORY:  FH: heart disease  father and mother       Denies Family history of CAD or early MI    ROS:  Constitutional: denies fever, chills  HEENT: denies blurry vision, difficulty hearing  Respiratory: +SOB, JORDAN, denies cough  Cardiovascular: +orthopnea, denies CP, palpitations, PND, LE edema  Gastrointestinal: denies nausea, vomiting, abdominal pain  Genitourinary: denies urinary changes  Skin: Denies rashes, itching  Neurologic: denies headache, weakness, dizziness  Hematology/Oncology: denies bleeding, easy bruising  ROS negative except as noted above      SOCIAL HISTORY:    No tobacco, Alcohol or Ddrug use    Vital Signs Last 24 Hrs  T(C): 36.6 (21 Dec 2023 07:57), Max: 37.2 (21 Dec 2023 00:41)  T(F): 97.8 (21 Dec 2023 07:57), Max: 98.9 (21 Dec 2023 00:41)  HR: 60 (21 Dec 2023 08:15) (54 - 84)  BP: 109/56 (21 Dec 2023 07:00) (101/57 - 194/114)  BP(mean): 79 (21 Dec 2023 07:00) (79 - 121)  RR: 22 (21 Dec 2023 07:00) (16 - 23)  SpO2: 95% (21 Dec 2023 08:15) (88% - 98%)    Parameters below as of 21 Dec 2023 06:00  Patient On (Oxygen Delivery Method): BiPAP/CPAP        Physical Exam:  General: Well developed, resting in bed on Bipap   HEENT: NCAT, PERRLA, EOMI bl  Neck: Supple, nontender, no mass  Neurology: A&Ox3, nonfocal, sensation intact   Respiratory: Crackles b/l   CV: RRR, +S1/S2, no murmurs, rubs or gallops  Abdominal: Soft, NT, ND +BSx4, no palpable masses  Extremities: No C/C/E, + peripheral pulses  MSK: Normal ROM, no joint erythema or warmth, no joint swelling   Heme: No obvious ecchymosis or petechiae   Skin: warm, dry, normal color      ECbpm, NSR --> 71bpm, NSR, left axis deviation     I&O's Detail    20 Dec 2023 07:01  -  21 Dec 2023 07:00  --------------------------------------------------------  IN:    Nitroglycerin: 3 mL  Total IN: 3 mL    OUT:    Voided (mL): 200 mL  Total OUT: 200 mL    Total NET: -197 mL          LABS:                        13.5   13.31 )-----------( 252      ( 20 Dec 2023 22:50 )             42.6     12    139  |  111<H>  |  38<H>  ----------------------------<  156<H>  4.0   |  20<L>  |  2.00<H>    Ca    8.2<L>      21 Dec 2023 04:28  Mg     1.9         TPro  7.2  /  Alb  3.6  /  TBili  0.5  /  DBili  x   /  AST  18  /  ALT  33  /  AlkPhos  115  1220        PT/INR - ( 20 Dec 2023 22:50 )   PT: 21.8 sec;   INR: 1.90 ratio         PTT - ( 20 Dec 2023 22:50 )  PTT:40.7 sec  Urinalysis Basic - ( 21 Dec 2023 04:28 )    Color: x / Appearance: x / SG: x / pH: x  Gluc: 156 mg/dL / Ketone: x  / Bili: x / Urobili: x   Blood: x / Protein: x / Nitrite: x   Leuk Esterase: x / RBC: x / WBC x   Sq Epi: x / Non Sq Epi: x / Bacteria: x      I&O's Summary    20 Dec 2023 07:01  -  21 Dec 2023 07:00  --------------------------------------------------------  IN: 3 mL / OUT: 200 mL / NET: -197 mL      BNP 1153

## 2023-12-21 NOTE — CONSULT NOTE ADULT - SUBJECTIVE AND OBJECTIVE BOX
[facility]  INFECTIOUS DISEASES - Gael Doherty, [address], [place] [address]  Tel: [phone]     Fax: [phone]  =======================================================    [id number]  TRUDI COOLEY     CC: Patient is a 73y old  Male who presents with a chief complaint of resp fail (21 Dec 2023 09:49)    HPI:  73M with PMHx CAD sp PCI/ANAHI x 2 to LAD/Ramus , MI 2016, HTN, HLD, AF on AC, CKD, stress echo 2018 EF 59%, who presented with SOB, JORDAN and congestion which has worsened in last day. Pt denies chest pain, fever, chills, sputum production, N/V, abd pain. Upon arrival to ED pt noted to be hypoxic, placed on NRB then on BIPAP. Patient was transferred to ICU. Reports improvement in breathing with BIPAP. Denies any cough. Denies any recent antibiotic use.      PAST MEDICAL & SURGICAL HISTORY:  Essential hypertension      Gastroesophageal reflux disease, esophagitis presence not specified      HTN (hypertension)      Hyperlipidemia      CAD (coronary artery disease)  stents x 2      CAD S/P percutaneous coronary angioplasty  with ANAHI          Social Hx:     FAMILY HISTORY:  FH: heart disease  father and mother         Allergies    No Known Allergies    Intolerances        Antibiotics:  MEDICATIONS  (STANDING):  amLODIPine   Tablet 10 milliGRAM(s) Oral daily  aspirin enteric coated 81 milliGRAM(s) Oral daily  atenolol  Tablet 50 milliGRAM(s) Oral daily  atorvastatin 20 milliGRAM(s) Oral at bedtime  folic acid 1 milliGRAM(s) Oral daily  furosemide   Injectable 80 milliGRAM(s) IV Push daily  hydrALAZINE 25 milliGRAM(s) Oral three times a day  influenza  Vaccine (HIGH DOSE) 0.7 milliLiter(s) IntraMuscular once  nitroglycerin  Infusion 5 MICROgram(s)/Min (1.5 mL/Hr) IV Continuous <Continuous>  rivaroxaban 15 milliGRAM(s) Oral with dinner    MEDICATIONS  (PRN):       REVIEW OF SYSTEMS: *limited as on BIPAP*  CONSTITUTIONAL:  No Fever or chills  HEENT:  No sore throat or runny nose.  CARDIOVASCULAR:  No chest pain   RESPIRATORY:  see history  GASTROINTESTINAL:  No nausea, vomiting or diarrhea.  GENITOURINARY:  No dysuria, frequency or urgency  MUSCULOSKELETAL:  no joint aches, no back pain  NEUROLOGIC:  No headache or dizziness    Physical Exam:  Vital Signs Last 24 Hrs  T(C): 36.6 (21 Dec 2023 11:41), Max: 37.2 (21 Dec 2023 00:41)  T(F): 97.8 (21 Dec 2023 11:41), Max: 98.9 (21 Dec 2023 00:41)  HR: 66 (21 Dec 2023 12:10) (54 - 84)  BP: 149/74 (21 Dec 2023 11:00) (101/57 - 194/114)  BP(mean): 105 (21 Dec 2023 11:00) (79 - 121)  RR: 21 (21 Dec 2023 11:00) (16 - 23)  SpO2: 96% (21 Dec 2023 12:10) (88% - 98%)    Parameters below as of 21 Dec 2023 06:00  Patient On (Oxygen Delivery Method): BiPAP/CPAP      Height (cm): 162.6 ( @ 06:00)  Weight (kg): 86.1 ( @ 06:00)  BMI (kg/m2): 32.6 ( @ 06:00)  BSA (m2): 1.91 ( @ 06:00)  GEN: Not in acute distress, on BIPAP  HEENT: normocephalic and atraumatic.   NECK: Supple.   LUNGS: mildly tachpneic  HEART: Regular rate and rhythm   ABDOMEN: Soft, nontender, and nondistended.    EXTREMITIES: No leg edema.  NEUROLOGIC: Answering simple questions  PSYCHIATRIC: Appropriate affect .      Labs:      139  |  111<H>  |  38<H>  ----------------------------<  156<H>  4.0   |  20<L>  |  2.00<H>    Ca    8.2<L>      21 Dec 2023 04:28  Mg     1.9         TPro  7.2  /  Alb  3.6  /  TBili  0.5  /  DBili  x   /  AST  18  /  ALT  33  /  AlkPhos  115                            14.3   17.69 )-----------( 257      ( 21 Dec 2023 09:45 )             44.0     PT/INR - ( 20 Dec 2023 22:50 )   PT: 21.8 sec;   INR: 1.90 ratio         PTT - ( 20 Dec 2023 22:50 )  PTT:40.7 sec  Urinalysis Basic - ( 21 Dec 2023 04:28 )    Color: x / Appearance: x / SG: x / pH: x  Gluc: 156 mg/dL / Ketone: x  / Bili: x / Urobili: x   Blood: x / Protein: x / Nitrite: x   Leuk Esterase: x / RBC: x / WBC x   Sq Epi: x / Non Sq Epi: x / Bacteria: x      LIVER FUNCTIONS - ( 20 Dec 2023 22:50 )  Alb: 3.6 g/dL / Pro: 7.2 g/dL / ALK PHOS: 115 U/L / ALT: 33 U/L / AST: 18 U/L / GGT: x               ABG - ( 21 Dec 2023 03:35 )  pH, Arterial: 7.31  pH, Blood: x     /  pCO2: 37    /  pO2: 87    / HCO3: 19    / Base Excess: -7.7  /  SaO2: 97.8                    SARS-CoV-2 Result: NotDetec (23 @ 22:50)  SARS-CoV-2: NotDetec (23 @ 22:50)      RECENT CULTURES:   @ 22:50          NotDetec        All imaging and other data have been reviewed.    < from: Xray Chest 1 View- PORTABLE-Urgent (23 @ 23:41) >  Heart likely enlarged.    There is diffuse moderate CHF which is new since  this year.    IMPRESSION: Diffuse moderate CHF new since prior.    < end of copied text >   [facility]  INFECTIOUS DISEASES - Gael Doherty, [address], [place] [address]  Tel: [phone]     Fax: [phone]  =======================================================    [id number]  TRUDI COOLEY     CC: Patient is a 73y old  Male who presents with a chief complaint of resp fail (21 Dec 2023 09:49)    HPI:  73M with PMHx CAD sp PCI/ANAHI x 2 to LAD/Ramus , MI 2016, HTN, HLD, AF on AC, CKD, stress echo 2018 EF 59%, who presented with SOB, JORDAN and congestion which has worsened in last day. Pt denies chest pain, fever, chills, sputum production, N/V, abd pain. Upon arrival to ED pt noted to be hypoxic, placed on NRB then on BIPAP. Patient was transferred to ICU. Reports improvement in breathing with BIPAP. Denies any cough. Denies any recent antibiotic use.      PAST MEDICAL & SURGICAL HISTORY:  Essential hypertension      Gastroesophageal reflux disease, esophagitis presence not specified      HTN (hypertension)      Hyperlipidemia      CAD (coronary artery disease)  stents x 2      CAD S/P percutaneous coronary angioplasty  with ANAHI          Social Hx:     FAMILY HISTORY:  FH: heart disease  father and mother         Allergies    No Known Allergies    Intolerances        Antibiotics:  MEDICATIONS  (STANDING):  amLODIPine   Tablet 10 milliGRAM(s) Oral daily  aspirin enteric coated 81 milliGRAM(s) Oral daily  atenolol  Tablet 50 milliGRAM(s) Oral daily  atorvastatin 20 milliGRAM(s) Oral at bedtime  folic acid 1 milliGRAM(s) Oral daily  furosemide   Injectable 80 milliGRAM(s) IV Push daily  hydrALAZINE 25 milliGRAM(s) Oral three times a day  influenza  Vaccine (HIGH DOSE) 0.7 milliLiter(s) IntraMuscular once  nitroglycerin  Infusion 5 MICROgram(s)/Min (1.5 mL/Hr) IV Continuous <Continuous>  rivaroxaban 15 milliGRAM(s) Oral with dinner    MEDICATIONS  (PRN):       REVIEW OF SYSTEMS: *limited as on BIPAP*  CONSTITUTIONAL:  No Fever or chills  HEENT:  No sore throat or runny nose.  CARDIOVASCULAR:  No chest pain   RESPIRATORY:  see history  GASTROINTESTINAL:  No nausea, vomiting or diarrhea.  GENITOURINARY:  No dysuria, frequency or urgency  MUSCULOSKELETAL:  no joint aches, no back pain  NEUROLOGIC:  No headache or dizziness    Physical Exam:  Vital Signs Last 24 Hrs  T(C): 36.6 (21 Dec 2023 11:41), Max: 37.2 (21 Dec 2023 00:41)  T(F): 97.8 (21 Dec 2023 11:41), Max: 98.9 (21 Dec 2023 00:41)  HR: 66 (21 Dec 2023 12:10) (54 - 84)  BP: 149/74 (21 Dec 2023 11:00) (101/57 - 194/114)  BP(mean): 105 (21 Dec 2023 11:00) (79 - 121)  RR: 21 (21 Dec 2023 11:00) (16 - 23)  SpO2: 96% (21 Dec 2023 12:10) (88% - 98%)    Parameters below as of 21 Dec 2023 06:00  Patient On (Oxygen Delivery Method): BiPAP/CPAP      Height (cm): 162.6 ( @ 06:00)  Weight (kg): 86.1 ( @ 06:00)  BMI (kg/m2): 32.6 ( @ 06:00)  BSA (m2): 1.91 ( @ 06:00)  GEN: Not in acute distress, on BIPAP  HEENT: normocephalic and atraumatic.   NECK: Supple.   LUNGS: mildly tachpneic  HEART: Regular rate and rhythm   ABDOMEN: Soft, nontender, and nondistended.    EXTREMITIES: No leg edema.  NEUROLOGIC: Answering simple questions  PSYCHIATRIC: Appropriate affect .      Labs:      139  |  111<H>  |  38<H>  ----------------------------<  156<H>  4.0   |  20<L>  |  2.00<H>    Ca    8.2<L>      21 Dec 2023 04:28  Mg     1.9         TPro  7.2  /  Alb  3.6  /  TBili  0.5  /  DBili  x   /  AST  18  /  ALT  33  /  AlkPhos  115                            14.3   17.69 )-----------( 257      ( 21 Dec 2023 09:45 )             44.0     PT/INR - ( 20 Dec 2023 22:50 )   PT: 21.8 sec;   INR: 1.90 ratio         PTT - ( 20 Dec 2023 22:50 )  PTT:40.7 sec  Urinalysis Basic - ( 21 Dec 2023 04:28 )    Color: x / Appearance: x / SG: x / pH: x  Gluc: 156 mg/dL / Ketone: x  / Bili: x / Urobili: x   Blood: x / Protein: x / Nitrite: x   Leuk Esterase: x / RBC: x / WBC x   Sq Epi: x / Non Sq Epi: x / Bacteria: x      LIVER FUNCTIONS - ( 20 Dec 2023 22:50 )  Alb: 3.6 g/dL / Pro: 7.2 g/dL / ALK PHOS: 115 U/L / ALT: 33 U/L / AST: 18 U/L / GGT: x               ABG - ( 21 Dec 2023 03:35 )  pH, Arterial: 7.31  pH, Blood: x     /  pCO2: 37    /  pO2: 87    / HCO3: 19    / Base Excess: -7.7  /  SaO2: 97.8                    SARS-CoV-2 Result: NotDetec (23 @ 22:50)  SARS-CoV-2: NotDetec (23 @ 22:50)      RECENT CULTURES:   @ 22:50          NotDetec        All imaging and other data have been reviewed.    < from: Xray Chest 1 View- PORTABLE-Urgent (23 @ 23:41) >  Heart likely enlarged.    There is diffuse moderate CHF which is new since  this year.    IMPRESSION: Diffuse moderate CHF new since prior.    < end of copied text >

## 2023-12-21 NOTE — PATIENT PROFILE ADULT - FALL HARM RISK - RISK INTERVENTIONS
Assistance OOB with selected safe patient handling equipment/Assistance with ambulation/Communicate Fall Risk and Risk Factors to all staff, patient, and family/Reinforce activity limits and safety measures with patient and family/Visual Cue: Yellow wristband/Bed in lowest position, wheels locked, appropriate side rails in place/Call bell, personal items and telephone in reach/Instruct patient to call for assistance before getting out of bed or chair/Non-slip footwear when patient is out of bed/[place] to call system/Physically safe environment - no spills, clutter or unnecessary equipment/Purposeful Proactive Rounding/Room/bathroom lighting operational, light cord in reach Assistance OOB with selected safe patient handling equipment/Assistance with ambulation/Communicate Fall Risk and Risk Factors to all staff, patient, and family/Reinforce activity limits and safety measures with patient and family/Visual Cue: Yellow wristband/Bed in lowest position, wheels locked, appropriate side rails in place/Call bell, personal items and telephone in reach/Instruct patient to call for assistance before getting out of bed or chair/Non-slip footwear when patient is out of bed/[place] to call system/Physically safe environment - no spills, clutter or unnecessary equipment/Purposeful Proactive Rounding/Room/bathroom lighting operational, light cord in reach

## 2023-12-21 NOTE — PROGRESS NOTE ADULT - ASSESSMENT
Pt is a 72y/o M with PMHx CAD, MI s/p PCI/ANAHI x2 to LAD/Ramus 2016, AF on Xarelto, CKD, HTN, HLD who presented to the ED with SOB and JORDAN, admitted to the ICU for acute hypoxemic respiratory failure and HTN urgency.    Neuro  - answers questions and follows commands appropriately  - no active issues    CV  - patient with HTN urgency upon admission, placed on nitro gtt with improvement  - now off nitro gtt, will start hydralazine 25mg TID PO, norvasc 10mg per cardio recs  - cont atenolol 50mg BID  - trops neg x2  - pt with CXR suggestive of CHF. s/p Lasix 80mg IVP x1  - cont Lasix 80mg IVP daily  - f/u tte  - hx afib on Xarelto, contrenally adjusted xarelto, pt in NSR  - cardiology following    Pulm  - Pt with acute hypoxic respiratory failure on admission, placed on BiPAP with improvement in saturation  - CXR suggestive of CHF, cont lasix 80mg IV daily  - f/u tte  - pt now off BiPAP, saturating well on NC  - RVP negative  - given lower likelihood of PNA, will d/c antibiotics      GI  - DASH/TLC diet  - no active issues    Renal  - Pt with LEOBARDO on CKD, baseline appears to be 1.5  - Cr this AM 2.0, will cont to monitor  - strict I/Os  - avoid MAP <65  - renally adjust medications  - avoid nephrotoxic medications    Heme  - H/H stable, no signs of active bleeding  - cont home Xarelto with renal adjustment  - SCDs for DVT ppx    ID  - pt afebrile, + leukocytosis  - pt started on abx ceftriaxone and azithromycin for empiric coverage  - RVP negative  - f/u Bcx x2  - given low suspicion for infection, will discontinue antibiotics at this time  - Trend temp curve and WBC    Endo  - BS stable  - no active issues    DISPO  - FULL CODE         Pt is a 74y/o M with PMHx CAD, MI s/p PCI/ANAHI x2 to LAD/Ramus 2016, AF on Xarelto, CKD, HTN, HLD who presented to the ED with SOB and JORDAN, admitted to the ICU for acute hypoxemic respiratory failure and HTN urgency.    Neuro  - answers questions and follows commands appropriately  - no active issues    CV  - patient with HTN urgency upon admission, placed on nitro gtt with improvement  - now off nitro gtt, will start hydralazine 25mg TID PO, norvasc 10mg per cardio recs  - cont atenolol 50mg BID  - trops neg x2  - pt with CXR suggestive of CHF. s/p Lasix 80mg IVP x1  - cont Lasix 80mg IVP daily  - f/u tte  - hx afib on Xarelto, contrenally adjusted xarelto, pt in NSR  - cardiology following    Pulm  - Pt with acute hypoxic respiratory failure on admission, placed on BiPAP with improvement in saturation  - CXR suggestive of CHF, cont lasix 80mg IV daily  - f/u tte  - pt now off BiPAP, saturating well on NC  - RVP negative  - given lower likelihood of PNA, will d/c antibiotics      GI  - DASH/TLC diet  - no active issues    Renal  - Pt with LEOBARDO on CKD, baseline appears to be 1.5  - Cr this AM 2.0, will cont to monitor  - strict I/Os  - avoid MAP <65  - renally adjust medications  - avoid nephrotoxic medications    Heme  - H/H stable, no signs of active bleeding  - cont home Xarelto with renal adjustment  - SCDs for DVT ppx    ID  - pt afebrile, + leukocytosis  - pt started on abx ceftriaxone and azithromycin for empiric coverage  - RVP negative  - f/u Bcx x2  - given low suspicion for infection, will discontinue antibiotics at this time  - Trend temp curve and WBC    Endo  - BS stable  - no active issues    DISPO  - FULL CODE         Pt is a 72y/o M with PMHx CAD, MI s/p PCI/ANAHI x2 to LAD/Ramus 2016, AF on Xarelto, CKD, HTN, HLD who presented to the ED with SOB and JORDAN, admitted to the ICU for acute hypoxemic respiratory failure and HTN urgency.    Neuro  - answers questions and follows commands appropriately  - no active issues    CV  - patient with HTN urgency upon admission, placed on nitro gtt with improvement  - now off nitro gtt, will start hydralazine 25mg TID PO, norvasc 10mg per cardio recs  - cont atenolol 50mg BID  - trops neg x2  - pt with CXR suggestive of CHF. s/p Lasix 80mg IVP x1  - cont Lasix 80mg IVP daily  - f/u tte  - hx afib on Xarelto, contrenally adjusted xarelto, pt in NSR  - cardiology following    Pulm  - Pt with acute hypoxic respiratory failure on admission, placed on BiPAP with improvement in saturation  - CXR suggestive of CHF, cont lasix 80mg IV daily  - f/u tte  - pt now off BiPAP, saturating well on NC  - RVP negative  - given lower likelihood of PNA, will d/c antibiotics      GI  - DASH/TLC diet  - no active issues    Renal  - Pt with LEOBARDO on CKD, baseline appears to be 1.5  - Cr this AM 2.0, will cont to monitor  - strict I/Os  - avoid MAP <65  - renally adjust medications  - avoid nephrotoxic medications    Heme  - H/H stable, no signs of active bleeding  - cont home Xarelto with renal adjustment  - SCDs for DVT ppx    ID  - pt afebrile, + leukocytosis  - pt started on abx ceftriaxone and azithromycin for empiric coverage  - RVP negative  - f/u Bcx x2  - given low suspicion for infection, will discontinue antibiotics at this time  - Trend temp curve and WBC    Endo  - BS stable  - no active issues    DISPO  - FULL CODE  - Pts wife Junie updated via phone call         Pt is a 74y/o M with PMHx CAD, MI s/p PCI/ANAHI x2 to LAD/Ramus 2016, AF on Xarelto, CKD, HTN, HLD who presented to the ED with SOB and JORDAN, admitted to the ICU for acute hypoxemic respiratory failure and HTN urgency.    Neuro  - answers questions and follows commands appropriately  - no active issues    CV  - patient with HTN urgency upon admission, placed on nitro gtt with improvement  - now off nitro gtt, will start hydralazine 25mg TID PO, norvasc 10mg per cardio recs  - cont atenolol 50mg BID  - trops neg x2  - pt with CXR suggestive of CHF. s/p Lasix 80mg IVP x1  - cont Lasix 80mg IVP daily  - f/u tte  - hx afib on Xarelto, contrenally adjusted xarelto, pt in NSR  - cardiology following    Pulm  - Pt with acute hypoxic respiratory failure on admission, placed on BiPAP with improvement in saturation  - CXR suggestive of CHF, cont lasix 80mg IV daily  - f/u tte  - pt now off BiPAP, saturating well on NC  - RVP negative  - given lower likelihood of PNA, will d/c antibiotics      GI  - DASH/TLC diet  - no active issues    Renal  - Pt with LEOBARDO on CKD, baseline appears to be 1.5  - Cr this AM 2.0, will cont to monitor  - strict I/Os  - avoid MAP <65  - renally adjust medications  - avoid nephrotoxic medications    Heme  - H/H stable, no signs of active bleeding  - cont home Xarelto with renal adjustment  - SCDs for DVT ppx    ID  - pt afebrile, + leukocytosis  - pt started on abx ceftriaxone and azithromycin for empiric coverage  - RVP negative  - f/u Bcx x2  - given low suspicion for infection, will discontinue antibiotics at this time  - Trend temp curve and WBC    Endo  - BS stable  - no active issues    DISPO  - FULL CODE  - Pts wife Junie updated via phone call

## 2023-12-21 NOTE — GOALS OF CARE CONVERSATION - ADVANCED CARE PLANNING - CONVERSATION DETAILS
[facility]-Palliative care SW met with patient at bedside in ICU. Reviewed patient's medical and social history as well as events leading to patient's hospitalization. Writer discussed patient's current diagnosis (SOB, PNA, CAD w/stents, Afib, HTN, HLD), medical condition and management. Inquired about advanced directives and patient states he does not have any in place. Reviewed HCP form. Writer recommended patient complete HCP. Patient states he did not want to complete at this time however accepted a blank form to review and discuss with his spouse. Patient showed insight into medical condition. All questions answered. Patient stated he will reach out if he wants to move forward with completion of HCP. Psychosocial support provided. \[facility]\""-Palliative care SW met with patient at bedside in ICU. Reviewed patient's medical and social history as well as events leading to patient's hospitalization. Writer discussed patient's current diagnosis (SOB, PNA, CAD w/stents, Afib, HTN, HLD), medical condition and management. Inquired about advanced directives and patient states he does not have any in place. Reviewed HCP form. Writer recommended patient complete HCP. Patient states he did not want to complete at this time however accepted a blank form to review and discuss with his spouse. Patient showed insight into medical condition. All questions answered. Patient stated he will reach out if he wants to move forward with completion of HCP. Psychosocial support provided.

## 2023-12-21 NOTE — CONSULT NOTE ADULT - SUBJECTIVE AND OBJECTIVE BOX
Patient is a 73y old  Male who presents with a chief complaint of resp fail (21 Dec 2023 14:01)       HPI: 72 Y/O CAD with stents, afib, hypertension, hyperlipidemia here complaining of heavy breathing. patient reports yesterday he had a mild cough then about 45 minutes prior to arrival patient developed difficulty breathing.  Has not seen nephrologist in the past.  States he is urinating.  NO N/V.  SOB improving.       PAST MEDICAL & SURGICAL HISTORY:  Essential hypertension      Gastroesophageal reflux disease, esophagitis presence not specified      HTN (hypertension)      Hyperlipidemia      CAD (coronary artery disease)  stents x 2      CAD S/P percutaneous coronary angioplasty  with ANAHI           FAMILY HISTORY:  FH: heart disease  father and mother     [place]    Social History:Non smoker    MEDICATIONS  (STANDING):  amLODIPine   Tablet 10 milliGRAM(s) Oral daily  aspirin enteric coated 81 milliGRAM(s) Oral daily  atenolol  Tablet 50 milliGRAM(s) Oral daily  atorvastatin 20 milliGRAM(s) Oral at bedtime  folic acid 1 milliGRAM(s) Oral daily  furosemide   Injectable 80 milliGRAM(s) IV Push daily  hydrALAZINE 25 milliGRAM(s) Oral three times a day  influenza  Vaccine (HIGH DOSE) 0.7 milliLiter(s) IntraMuscular once  nitroglycerin  Infusion 5 MICROgram(s)/Min (1.5 mL/Hr) IV Continuous <Continuous>  rivaroxaban 15 milliGRAM(s) Oral with dinner    MEDICATIONS  (PRN):   Meds reviewed    Allergies    No Known Allergies    Intolerances         REVIEW OF SYSTEMS:    Review of Systems:   Constitutional: Denies fatigue  HEENT: Denies headaches and dizziness  Respiratory: SOB improving  Cardiovascular: denies CP, palpitations  Gastrointestinal: Denies nausea, denies vomiting, diarrhea, constipation, abdominal pain, or bloody stools  Genitourinary: denies painful urination, increased frequency, urgency, or bloody urine  Skin: denies rashes or itching  Musculoskeletal: denies muscle aches, joint swelling  Neurologic: Denies generalized weakness, denies loss of sensation, numbness, or tingling      Vital Signs Last 24 Hrs  T(C): 37 (21 Dec 2023 15:30), Max: 37.2 (21 Dec 2023 00:41)  T(F): 98.6 (21 Dec 2023 15:30), Max: 98.9 (21 Dec 2023 00:41)  HR: 73 (21 Dec 2023 15:00) (54 - 84)  BP: 155/72 (21 Dec 2023 15:00) (101/57 - 194/114)  BP(mean): 103 (21 Dec 2023 15:00) (79 - 121)  RR: 19 (21 Dec 2023 15:00) (16 - 23)  SpO2: 94% (21 Dec 2023 15:00) (88% - 98%)    Parameters below as of 21 Dec 2023 06:00  Patient On (Oxygen Delivery Method): BiPAP/CPAP      Daily Height in cm: 162.56 (21 Dec 2023 06:00)    Daily Weight in k.1 (21 Dec 2023 07:00)    PHYSICAL EXAM:    GENERAL: NAD  HEAD:  Atraumatic, Normocephalic  EYES: EOMI, conjunctiva and sclera clear  ENMT: No Drainage from nares, No drainage from ears  NERVOUS SYSTEM:  Awake and Alert  CHEST/LUNG: + rales  EXTREMITIES:  No Edema  SKIN: No rashes No obvious ecchymosis      LABS:                        14.3   17.69 )-----------( 257      ( 21 Dec 2023 09:45 )             44.0     12-    139  |  111<H>  |  38<H>  ----------------------------<  156<H>  4.0   |  20<L>  |  2.00<H>    Ca    8.2<L>      21 Dec 2023 04:28  Mg     1.9     12-20    TPro  7.2  /  Alb  3.6  /  TBili  0.5  /  DBili  x   /  AST  18  /  ALT  33  /  AlkPhos  115  12-20    PT/INR - ( 20 Dec 2023 22:50 )   PT: 21.8 sec;   INR: 1.90 ratio         PTT - ( 20 Dec 2023 22:50 )  PTT:40.7 sec  Urinalysis Basic - ( 21 Dec 2023 04:28 )    Color: x / Appearance: x / SG: x / pH: x  Gluc: 156 mg/dL / Ketone: x  / Bili: x / Urobili: x   Blood: x / Protein: x / Nitrite: x   Leuk Esterase: x / RBC: x / WBC x   Sq Epi: x / Non Sq Epi: x / Bacteria: x      Magnesium: 1.9 mg/dL (12-20 @ 22:50)    ABG - ( 21 Dec 2023 03:35 )  pH, Arterial: 7.31  pH, Blood: x     /  pCO2: 37    /  pO2: 87    / HCO3: 19    / Base Excess: -7.7  /  SaO2: 97.8                  RADIOLOGY & ADDITIONAL TESTS:

## 2023-12-21 NOTE — CONSULT NOTE ADULT - ASSESSMENT
73 M history CAD with stents, afib, hypertension, hyperlipidemia here complaining of heavy breathing. patient reports yesterday he had a mild cough then about 45 minutes prior to arrival patient developed difficulty breathing.    resp distress  eval for Pulm Edema  HTN  AF  CAD  HF eval  CKD  BMI > 30    doubt PNA  cxr more consistent with Pulm Edema and CHF  Diuresis in progress  BP control  I and O  serial renal indices  check Biomarkers - Cx -   consideration for CT chest  HANK eval as outpatient  cvs rx regimen optimization  cardio eval  NIV use and wean as tolerated  spoke with WIFE  dietary discretion for CAD CHF  follows with Dr Lomas in the office for Cardio  TTE -

## 2023-12-21 NOTE — CONSULT NOTE ADULT - SUBJECTIVE AND OBJECTIVE BOX
Date/Time Patient Seen:  		  Referring MD:   Data Reviewed	       Patient is a 73y old  Male who presents with a chief complaint of     Subjective/HPI   · Chief Complaint: The patient is a 73y Male complaining of shortness of breath.  · HPI Objective Statement: 73 M history CAD with stents, afib, hypertension, hyperlipidemia here complaining of heavy breathing. patient reports yesterday he had a mild cough then about 45 minutes prior to arrival patient developed difficulty breathing. patient feel like he has congestion in his head and chest. no chest pain, nausea, vomiting, dizziness, sweating, abdominal pain. no fevers, chills, sick contacts. EMS gave Nitro and ASA. PMD Tamparo. Cards Dr. Gillespie.    PAST MEDICAL & SURGICAL HISTORY:  Essential hypertension    Gastroesophageal reflux disease, esophagitis presence not specified    HTN (hypertension)    Hyperlipidemia    CAD (coronary artery disease)  stents x 2    No significant past surgical history    No significant past surgical history    CAD S/P percutaneous coronary angioplasty  with ANAHI      PAST SURGICAL HISTORY:  CAD S/P percutaneous coronary angioplasty with ANAHI.     FAMILY HISTORY:  FH: heart disease, father and mother .     Tobacco Usage:  · Tobacco Usage	Former smoker    ALLERGIES AND HOME MEDICATIONS:   Allergies:        Allergies:  	No Known Allergies:     Home Medications:   * Incomplete Medication History as of 2023 06:46 documented in Structured Notes  · 	atenolol 50 mg oral tablet: 1 tab(s) orally once a day  · 	aspirin 81 mg oral tablet: 1 tab(s) orally once a day  · 	folic acid 1 mg oral tablet: 1 tab(s) orally once a day  · 	Colace 100 mg oral capsule: as needed  · 	Lipitor 20 mg oral tablet: 1 tab(s) orally once a day  · 	Xarelto:     VITAL SIGNS ([place]set):    ,,ED ADULT Flow Sheet:    20-Dec-2023 22:29  · Temp (F): 98.2  · Temp (C) Temp (C): 36.8  · Temp site Temp Site: oral  · Heart Rate Heart Rate (beats/min): 84  · BP Systolic Systolic: 194  · BP Diastolic Diastolic (mm Hg): 114  · Respiration Rate (breaths/min) Respiration Rate (breaths/min): 18  · SpO2 (%) SpO2 (%): 88  · O2 Delivery/Oxygen Delivery Method Patient On (Oxygen Delivery Method): mask, nonrebreather  · Oxygen Therapy Flow (L/min) Oxygen Flow (L/min): 15  · How was the weight captured? Weight Type/Method: actual  · Dosing Weight (KILOGRAMS) Dosing Weight (KILOGRAMS): 88.5  · Dosing Weight  (POUNDS) Dosing Weight (POUNDS): 195.1  · Height type Height Type: actual  · Height (FEET) Height (FEET): 5  · Height (INCHES) Height (INCHES): 4  · Height (CENTIMETERS) Height (CENTIMETERS): 162.56  · BSA (m2): 1.94  · BMI (kG/m2) BMI (kG/m2): 33.5  · Ideal Body Weight(kg) Ideal Body Weight(kg): 59  · SpO2 (%) SpO2 (%): 88  · Preferred Language to Address Healthcare Preferred Language to Address Healthcare: English        Medication list         MEDICATIONS  (STANDING):  aspirin enteric coated 81 milliGRAM(s) Oral daily  atenolol  Tablet 50 milliGRAM(s) Oral daily  atorvastatin 20 milliGRAM(s) Oral at bedtime  azithromycin  IVPB 500 milliGRAM(s) IV Intermittent every 24 hours  cefTRIAXone   IVPB 1000 milliGRAM(s) IV Intermittent every 24 hours  folic acid 1 milliGRAM(s) Oral daily  furosemide   Injectable 80 milliGRAM(s) IV Push daily  influenza  Vaccine (HIGH DOSE) 0.7 milliLiter(s) IntraMuscular once  nitroglycerin  Infusion 5 MICROgram(s)/Min (1.5 mL/Hr) IV Continuous <Continuous>  rivaroxaban 15 milliGRAM(s) Oral with dinner    MEDICATIONS  (PRN):         Vitals log        ICU Vital Signs Last 24 Hrs  T(C): 36.4 (21 Dec 2023 06:00), Max: 37.2 (21 Dec 2023 00:41)  T(F): 97.5 (21 Dec 2023 06:00), Max: 98.9 (21 Dec 2023 00:41)  HR: 54 (21 Dec 2023 07:00) (54 - 84)  BP: 109/56 (21 Dec 2023 07:00) (101/57 - 194/114)  BP(mean): 79 (21 Dec 2023 07:00) (79 - 121)  ABP: --  ABP(mean): --  RR: 22 (21 Dec 2023 07:00) (16 - 23)  SpO2: 93% (21 Dec 2023 07:00) (88% - 98%)    O2 Parameters below as of 21 Dec 2023 06:00  Patient On (Oxygen Delivery Method): BiPAP/CPAP                 Input and Output:  I&O's Detail    20 Dec 2023 07:01  -  21 Dec 2023 07:00  --------------------------------------------------------  IN:    Nitroglycerin: 3 mL  Total IN: 3 mL    OUT:    Voided (mL): 200 mL  Total OUT: 200 mL    Total NET: -197 mL          Lab Data                        13.5   13.31 )-----------( 252      ( 20 Dec 2023 22:50 )             42.6     12    139  |  111<H>  |  38<H>  ----------------------------<  156<H>  4.0   |  20<L>  |  2.00<H>    Ca    8.2<L>      21 Dec 2023 04:28  Mg     1.9     12-20    TPro  7.2  /  Alb  3.6  /  TBili  0.5  /  DBili  x   /  AST  18  /  ALT  33  /  AlkPhos  115  12-20    ABG - ( 21 Dec 2023 03:35 )  pH, Arterial: 7.31  pH, Blood: x     /  pCO2: 37    /  pO2: 87    / HCO3: 19    / Base Excess: -7.7  /  SaO2: 97.8                    Review of Systems	    sob  cm  weakness  resp distress    Objective     Physical Examination    heart s1s2  lung dc BS  head nc  verbal      Pertinent Lab findings & Imaging      Barnes:  NO   Adequate UO     I&O's Detail    20 Dec 2023 07:01  -  21 Dec 2023 07:00  --------------------------------------------------------  IN:    Nitroglycerin: 3 mL  Total IN: 3 mL    OUT:    Voided (mL): 200 mL  Total OUT: 200 mL    Total NET: -197 mL               Discussed with:     Cultures:	        Radiology    ACC: [id number] EXAM:  XR CHEST PORTABLE URGENT 1V   ORDERED BY: MAHESH LOPEZ     PROCEDURE DATE:  2023          INTERPRETATION:  Clinical History: 72-year-old male, chest pain.    Single view of the chest is compared to 3/26/2018.    FINDINGS: Normal cardiac silhouette and normal pulmonary vasculature with   no consolidation, effusion, pneumothorax or acute osseous finding.    IMPRESSION:  No acute radiographic findings and no change    --- End of Report ---            SAMAN PISANO DO; Attending Radiologist  This document has been electronically signed. 2023 11:44AM                           Date/Time Patient Seen:  		  Referring MD:   Data Reviewed	       Patient is a 73y old  Male who presents with a chief complaint of     Subjective/HPI   · Chief Complaint: The patient is a 73y Male complaining of shortness of breath.  · HPI Objective Statement: 73 M history CAD with stents, afib, hypertension, hyperlipidemia here complaining of heavy breathing. patient reports yesterday he had a mild cough then about 45 minutes prior to arrival patient developed difficulty breathing. patient feel like he has congestion in his head and chest. no chest pain, nausea, vomiting, dizziness, sweating, abdominal pain. no fevers, chills, sick contacts. EMS gave Nitro and ASA. PMD Tamparo. Cards Dr. Gillespie.    PAST MEDICAL & SURGICAL HISTORY:  Essential hypertension    Gastroesophageal reflux disease, esophagitis presence not specified    HTN (hypertension)    Hyperlipidemia    CAD (coronary artery disease)  stents x 2    No significant past surgical history    No significant past surgical history    CAD S/P percutaneous coronary angioplasty  with ANAHI      PAST SURGICAL HISTORY:  CAD S/P percutaneous coronary angioplasty with ANAHI.     FAMILY HISTORY:  FH: heart disease, father and mother .     Tobacco Usage:  · Tobacco Usage	Former smoker    ALLERGIES AND HOME MEDICATIONS:   Allergies:        Allergies:  	No Known Allergies:     Home Medications:   * Incomplete Medication History as of 2023 06:46 documented in Structured Notes  · 	atenolol 50 mg oral tablet: 1 tab(s) orally once a day  · 	aspirin 81 mg oral tablet: 1 tab(s) orally once a day  · 	folic acid 1 mg oral tablet: 1 tab(s) orally once a day  · 	Colace 100 mg oral capsule: as needed  · 	Lipitor 20 mg oral tablet: 1 tab(s) orally once a day  · 	Xarelto:     VITAL SIGNS ([place]):    ,,ED ADULT Flow Sheet:    20-Dec-2023 22:29  · Temp (F): 98.2  · Temp (C) Temp (C): 36.8  · Temp site Temp Site: oral  · Heart Rate Heart Rate (beats/min): 84  · BP Systolic Systolic: 194  · BP Diastolic Diastolic (mm Hg): 114  · Respiration Rate (breaths/min) Respiration Rate (breaths/min): 18  · SpO2 (%) SpO2 (%): 88  · O2 Delivery/Oxygen Delivery Method Patient On (Oxygen Delivery Method): mask, nonrebreather  · Oxygen Therapy Flow (L/min) Oxygen Flow (L/min): 15  · How was the weight captured? Weight Type/Method: actual  · Dosing Weight (KILOGRAMS) Dosing Weight (KILOGRAMS): 88.5  · Dosing Weight  (POUNDS) Dosing Weight (POUNDS): 195.1  · Height type Height Type: actual  · Height (FEET) Height (FEET): 5  · Height (INCHES) Height (INCHES): 4  · Height (CENTIMETERS) Height (CENTIMETERS): 162.56  · BSA (m2): 1.94  · BMI (kG/m2) BMI (kG/m2): 33.5  · Ideal Body Weight(kg) Ideal Body Weight(kg): 59  · SpO2 (%) SpO2 (%): 88  · Preferred Language to Address Healthcare Preferred Language to Address Healthcare: English        Medication list         MEDICATIONS  (STANDING):  aspirin enteric coated 81 milliGRAM(s) Oral daily  atenolol  Tablet 50 milliGRAM(s) Oral daily  atorvastatin 20 milliGRAM(s) Oral at bedtime  azithromycin  IVPB 500 milliGRAM(s) IV Intermittent every 24 hours  cefTRIAXone   IVPB 1000 milliGRAM(s) IV Intermittent every 24 hours  folic acid 1 milliGRAM(s) Oral daily  furosemide   Injectable 80 milliGRAM(s) IV Push daily  influenza  Vaccine (HIGH DOSE) 0.7 milliLiter(s) IntraMuscular once  nitroglycerin  Infusion 5 MICROgram(s)/Min (1.5 mL/Hr) IV Continuous <Continuous>  rivaroxaban 15 milliGRAM(s) Oral with dinner    MEDICATIONS  (PRN):         Vitals log        ICU Vital Signs Last 24 Hrs  T(C): 36.4 (21 Dec 2023 06:00), Max: 37.2 (21 Dec 2023 00:41)  T(F): 97.5 (21 Dec 2023 06:00), Max: 98.9 (21 Dec 2023 00:41)  HR: 54 (21 Dec 2023 07:00) (54 - 84)  BP: 109/56 (21 Dec 2023 07:00) (101/57 - 194/114)  BP(mean): 79 (21 Dec 2023 07:00) (79 - 121)  ABP: --  ABP(mean): --  RR: 22 (21 Dec 2023 07:00) (16 - 23)  SpO2: 93% (21 Dec 2023 07:00) (88% - 98%)    O2 Parameters below as of 21 Dec 2023 06:00  Patient On (Oxygen Delivery Method): BiPAP/CPAP                 Input and Output:  I&O's Detail    20 Dec 2023 07:01  -  21 Dec 2023 07:00  --------------------------------------------------------  IN:    Nitroglycerin: 3 mL  Total IN: 3 mL    OUT:    Voided (mL): 200 mL  Total OUT: 200 mL    Total NET: -197 mL          Lab Data                        13.5   13.31 )-----------( 252      ( 20 Dec 2023 22:50 )             42.6     12    139  |  111<H>  |  38<H>  ----------------------------<  156<H>  4.0   |  20<L>  |  2.00<H>    Ca    8.2<L>      21 Dec 2023 04:28  Mg     1.9     12-20    TPro  7.2  /  Alb  3.6  /  TBili  0.5  /  DBili  x   /  AST  18  /  ALT  33  /  AlkPhos  115  12-20    ABG - ( 21 Dec 2023 03:35 )  pH, Arterial: 7.31  pH, Blood: x     /  pCO2: 37    /  pO2: 87    / HCO3: 19    / Base Excess: -7.7  /  SaO2: 97.8                    Review of Systems	    sob  cm  weakness  resp distress    Objective     Physical Examination    heart s1s2  lung dc BS  head nc  verbal      Pertinent Lab findings & Imaging      Barnes:  NO   Adequate UO     I&O's Detail    20 Dec 2023 07:01  -  21 Dec 2023 07:00  --------------------------------------------------------  IN:    Nitroglycerin: 3 mL  Total IN: 3 mL    OUT:    Voided (mL): 200 mL  Total OUT: 200 mL    Total NET: -197 mL               Discussed with:     Cultures:	        Radiology    ACC: [id number] EXAM:  XR CHEST PORTABLE URGENT 1V   ORDERED BY: MAHESH LOPEZ     PROCEDURE DATE:  2023          INTERPRETATION:  Clinical History: 72-year-old male, chest pain.    Single view of the chest is compared to 3/26/2018.    FINDINGS: Normal cardiac silhouette and normal pulmonary vasculature with   no consolidation, effusion, pneumothorax or acute osseous finding.    IMPRESSION:  No acute radiographic findings and no change    --- End of Report ---            SAMAN PISANO DO; Attending Radiologist  This document has been electronically signed. 2023 11:44AM

## 2023-12-22 LAB
ALBUMIN SERPL ELPH-MCNC: 3.2 G/DL — LOW (ref 3.3–5)
ALBUMIN SERPL ELPH-MCNC: 3.2 G/DL — LOW (ref 3.3–5)
ALP SERPL-CCNC: 90 U/L — SIGNIFICANT CHANGE UP (ref 40–120)
ALP SERPL-CCNC: 90 U/L — SIGNIFICANT CHANGE UP (ref 40–120)
ALT FLD-CCNC: 27 U/L — SIGNIFICANT CHANGE UP (ref 12–78)
ALT FLD-CCNC: 27 U/L — SIGNIFICANT CHANGE UP (ref 12–78)
ANION GAP SERPL CALC-SCNC: 7 MMOL/L — SIGNIFICANT CHANGE UP (ref 5–17)
ANION GAP SERPL CALC-SCNC: 7 MMOL/L — SIGNIFICANT CHANGE UP (ref 5–17)
APTT BLD: 37.3 SEC — HIGH (ref 24.5–35.6)
APTT BLD: 37.3 SEC — HIGH (ref 24.5–35.6)
AST SERPL-CCNC: 19 U/L — SIGNIFICANT CHANGE UP (ref 15–37)
AST SERPL-CCNC: 19 U/L — SIGNIFICANT CHANGE UP (ref 15–37)
BASOPHILS # BLD AUTO: 0.07 K/UL — SIGNIFICANT CHANGE UP (ref 0–0.2)
BASOPHILS # BLD AUTO: 0.07 K/UL — SIGNIFICANT CHANGE UP (ref 0–0.2)
BASOPHILS NFR BLD AUTO: 0.6 % — SIGNIFICANT CHANGE UP (ref 0–2)
BASOPHILS NFR BLD AUTO: 0.6 % — SIGNIFICANT CHANGE UP (ref 0–2)
BILIRUB SERPL-MCNC: 1.3 MG/DL — HIGH (ref 0.2–1.2)
BILIRUB SERPL-MCNC: 1.3 MG/DL — HIGH (ref 0.2–1.2)
BUN SERPL-MCNC: 33 MG/DL — HIGH (ref 7–23)
BUN SERPL-MCNC: 33 MG/DL — HIGH (ref 7–23)
CALCIUM SERPL-MCNC: 8.4 MG/DL — LOW (ref 8.5–10.1)
CALCIUM SERPL-MCNC: 8.4 MG/DL — LOW (ref 8.5–10.1)
CHLORIDE SERPL-SCNC: 109 MMOL/L — HIGH (ref 96–108)
CHLORIDE SERPL-SCNC: 109 MMOL/L — HIGH (ref 96–108)
CO2 SERPL-SCNC: 24 MMOL/L — SIGNIFICANT CHANGE UP (ref 22–31)
CO2 SERPL-SCNC: 24 MMOL/L — SIGNIFICANT CHANGE UP (ref 22–31)
CREAT SERPL-MCNC: 1.8 MG/DL — HIGH (ref 0.5–1.3)
CREAT SERPL-MCNC: 1.8 MG/DL — HIGH (ref 0.5–1.3)
EGFR: 40 ML/MIN/1.73M2 — LOW
EGFR: 40 ML/MIN/1.73M2 — LOW
EOSINOPHIL # BLD AUTO: 0.12 K/UL — SIGNIFICANT CHANGE UP (ref 0–0.5)
EOSINOPHIL # BLD AUTO: 0.12 K/UL — SIGNIFICANT CHANGE UP (ref 0–0.5)
EOSINOPHIL NFR BLD AUTO: 1 % — SIGNIFICANT CHANGE UP (ref 0–6)
EOSINOPHIL NFR BLD AUTO: 1 % — SIGNIFICANT CHANGE UP (ref 0–6)
GLUCOSE SERPL-MCNC: 113 MG/DL — HIGH (ref 70–99)
GLUCOSE SERPL-MCNC: 113 MG/DL — HIGH (ref 70–99)
HCT VFR BLD CALC: 43.1 % — SIGNIFICANT CHANGE UP (ref 39–50)
HCT VFR BLD CALC: 43.1 % — SIGNIFICANT CHANGE UP (ref 39–50)
HCV AB S/CO SERPL IA: 0.12 S/CO — SIGNIFICANT CHANGE UP (ref 0–0.99)
HCV AB S/CO SERPL IA: 0.12 S/CO — SIGNIFICANT CHANGE UP (ref 0–0.99)
HCV AB SERPL-IMP: SIGNIFICANT CHANGE UP
HCV AB SERPL-IMP: SIGNIFICANT CHANGE UP
HGB BLD-MCNC: 13.8 G/DL — SIGNIFICANT CHANGE UP (ref 13–17)
HGB BLD-MCNC: 13.8 G/DL — SIGNIFICANT CHANGE UP (ref 13–17)
IMM GRANULOCYTES NFR BLD AUTO: 0.3 % — SIGNIFICANT CHANGE UP (ref 0–0.9)
IMM GRANULOCYTES NFR BLD AUTO: 0.3 % — SIGNIFICANT CHANGE UP (ref 0–0.9)
INR BLD: 1.86 RATIO — HIGH (ref 0.85–1.18)
INR BLD: 1.86 RATIO — HIGH (ref 0.85–1.18)
LYMPHOCYTES # BLD AUTO: 1.47 K/UL — SIGNIFICANT CHANGE UP (ref 1–3.3)
LYMPHOCYTES # BLD AUTO: 1.47 K/UL — SIGNIFICANT CHANGE UP (ref 1–3.3)
LYMPHOCYTES # BLD AUTO: 11.8 % — LOW (ref 13–44)
LYMPHOCYTES # BLD AUTO: 11.8 % — LOW (ref 13–44)
MAGNESIUM SERPL-MCNC: 2.3 MG/DL — SIGNIFICANT CHANGE UP (ref 1.6–2.6)
MAGNESIUM SERPL-MCNC: 2.3 MG/DL — SIGNIFICANT CHANGE UP (ref 1.6–2.6)
MCHC RBC-ENTMCNC: 23.5 PG — LOW (ref 27–34)
MCHC RBC-ENTMCNC: 23.5 PG — LOW (ref 27–34)
MCHC RBC-ENTMCNC: 32 GM/DL — SIGNIFICANT CHANGE UP (ref 32–36)
MCHC RBC-ENTMCNC: 32 GM/DL — SIGNIFICANT CHANGE UP (ref 32–36)
MCV RBC AUTO: 73.4 FL — LOW (ref 80–100)
MCV RBC AUTO: 73.4 FL — LOW (ref 80–100)
MONOCYTES # BLD AUTO: 0.98 K/UL — HIGH (ref 0–0.9)
MONOCYTES # BLD AUTO: 0.98 K/UL — HIGH (ref 0–0.9)
MONOCYTES NFR BLD AUTO: 7.9 % — SIGNIFICANT CHANGE UP (ref 2–14)
MONOCYTES NFR BLD AUTO: 7.9 % — SIGNIFICANT CHANGE UP (ref 2–14)
MRSA PCR RESULT.: SIGNIFICANT CHANGE UP
MRSA PCR RESULT.: SIGNIFICANT CHANGE UP
NEUTROPHILS # BLD AUTO: 9.8 K/UL — HIGH (ref 1.8–7.4)
NEUTROPHILS # BLD AUTO: 9.8 K/UL — HIGH (ref 1.8–7.4)
NEUTROPHILS NFR BLD AUTO: 78.4 % — HIGH (ref 43–77)
NEUTROPHILS NFR BLD AUTO: 78.4 % — HIGH (ref 43–77)
NRBC # BLD: 0 /100 WBCS — SIGNIFICANT CHANGE UP (ref 0–0)
NRBC # BLD: 0 /100 WBCS — SIGNIFICANT CHANGE UP (ref 0–0)
PHOSPHATE SERPL-MCNC: 2.6 MG/DL — SIGNIFICANT CHANGE UP (ref 2.5–4.5)
PHOSPHATE SERPL-MCNC: 2.6 MG/DL — SIGNIFICANT CHANGE UP (ref 2.5–4.5)
PLATELET # BLD AUTO: 241 K/UL — SIGNIFICANT CHANGE UP (ref 150–400)
PLATELET # BLD AUTO: 241 K/UL — SIGNIFICANT CHANGE UP (ref 150–400)
POTASSIUM SERPL-MCNC: 3.9 MMOL/L — SIGNIFICANT CHANGE UP (ref 3.5–5.3)
POTASSIUM SERPL-MCNC: 3.9 MMOL/L — SIGNIFICANT CHANGE UP (ref 3.5–5.3)
POTASSIUM SERPL-SCNC: 3.9 MMOL/L — SIGNIFICANT CHANGE UP (ref 3.5–5.3)
POTASSIUM SERPL-SCNC: 3.9 MMOL/L — SIGNIFICANT CHANGE UP (ref 3.5–5.3)
PROT SERPL-MCNC: 7 G/DL — SIGNIFICANT CHANGE UP (ref 6–8.3)
PROT SERPL-MCNC: 7 G/DL — SIGNIFICANT CHANGE UP (ref 6–8.3)
PROTHROM AB SERPL-ACNC: 21.4 SEC — HIGH (ref 9.5–13)
PROTHROM AB SERPL-ACNC: 21.4 SEC — HIGH (ref 9.5–13)
RBC # BLD: 5.87 M/UL — HIGH (ref 4.2–5.8)
RBC # BLD: 5.87 M/UL — HIGH (ref 4.2–5.8)
RBC # FLD: 17.2 % — HIGH (ref 10.3–14.5)
RBC # FLD: 17.2 % — HIGH (ref 10.3–14.5)
S AUREUS DNA NOSE QL NAA+PROBE: DETECTED
S AUREUS DNA NOSE QL NAA+PROBE: DETECTED
SODIUM SERPL-SCNC: 140 MMOL/L — SIGNIFICANT CHANGE UP (ref 135–145)
SODIUM SERPL-SCNC: 140 MMOL/L — SIGNIFICANT CHANGE UP (ref 135–145)
WBC # BLD: 12.48 K/UL — HIGH (ref 3.8–10.5)
WBC # BLD: 12.48 K/UL — HIGH (ref 3.8–10.5)
WBC # FLD AUTO: 12.48 K/UL — HIGH (ref 3.8–10.5)
WBC # FLD AUTO: 12.48 K/UL — HIGH (ref 3.8–10.5)

## 2023-12-22 PROCEDURE — 99232 SBSQ HOSP IP/OBS MODERATE 35: CPT

## 2023-12-22 PROCEDURE — 99233 SBSQ HOSP IP/OBS HIGH 50: CPT | Mod: GC

## 2023-12-22 PROCEDURE — 71250 CT THORAX DX C-: CPT | Mod: 26

## 2023-12-22 PROCEDURE — 99233 SBSQ HOSP IP/OBS HIGH 50: CPT

## 2023-12-22 RX ORDER — ATENOLOL 25 MG/1
50 TABLET ORAL
Refills: 0 | Status: DISCONTINUED | OUTPATIENT
Start: 2023-12-22 | End: 2023-12-24

## 2023-12-22 RX ORDER — ATORVASTATIN CALCIUM 80 MG/1
40 TABLET, FILM COATED ORAL AT BEDTIME
Refills: 0 | Status: DISCONTINUED | OUTPATIENT
Start: 2023-12-22 | End: 2023-12-24

## 2023-12-22 RX ORDER — ATENOLOL 25 MG/1
50 TABLET ORAL
Refills: 0 | Status: DISCONTINUED | OUTPATIENT
Start: 2023-12-22 | End: 2023-12-22

## 2023-12-22 RX ORDER — CHLORHEXIDINE GLUCONATE 213 G/1000ML
1 SOLUTION TOPICAL EVERY 24 HOURS
Refills: 0 | Status: DISCONTINUED | OUTPATIENT
Start: 2023-12-23 | End: 2023-12-23

## 2023-12-22 RX ORDER — MUPIROCIN 20 MG/G
1 OINTMENT TOPICAL
Refills: 0 | Status: DISCONTINUED | OUTPATIENT
Start: 2023-12-22 | End: 2023-12-24

## 2023-12-22 RX ORDER — METOPROLOL TARTRATE 50 MG
5 TABLET ORAL ONCE
Refills: 0 | Status: COMPLETED | OUTPATIENT
Start: 2023-12-22 | End: 2023-12-22

## 2023-12-22 RX ADMIN — Medication 1 MILLIGRAM(S): at 11:45

## 2023-12-22 RX ADMIN — RIVAROXABAN 15 MILLIGRAM(S): KIT at 18:42

## 2023-12-22 RX ADMIN — Medication 5 MILLIGRAM(S): at 06:12

## 2023-12-22 RX ADMIN — ATENOLOL 50 MILLIGRAM(S): 25 TABLET ORAL at 17:39

## 2023-12-22 RX ADMIN — Medication 25 MILLIGRAM(S): at 05:53

## 2023-12-22 RX ADMIN — ATENOLOL 50 MILLIGRAM(S): 25 TABLET ORAL at 05:53

## 2023-12-22 RX ADMIN — Medication 81 MILLIGRAM(S): at 11:44

## 2023-12-22 RX ADMIN — Medication 25 MILLIGRAM(S): at 22:40

## 2023-12-22 RX ADMIN — AMLODIPINE BESYLATE 10 MILLIGRAM(S): 2.5 TABLET ORAL at 05:54

## 2023-12-22 RX ADMIN — ATORVASTATIN CALCIUM 40 MILLIGRAM(S): 80 TABLET, FILM COATED ORAL at 22:42

## 2023-12-22 RX ADMIN — Medication 25 MILLIGRAM(S): at 13:29

## 2023-12-22 RX ADMIN — Medication 80 MILLIGRAM(S): at 05:53

## 2023-12-22 NOTE — PROGRESS NOTE ADULT - ATTENDING COMMENTS
72 yo man with Hx CAD, ANAHI, afib on xarelto, CKD with acute pulmonary edema and htn emergency, now improved.    --mentating well  --acute pulmonary edema and htn emergency, now resolved  continue hydralazine, norvasc, atenolol  volume overload, continue current lasix 80 daily  --CAD, continue ASA, statin, atenolol  --afib inadequately controlled, increase atenolol to hold dose, AC with xarelto  --hypoxemia on 4L NC, wean as tolerates  --LEOBARDO on CKD in setting of htn emergency, improving  --advance diet  --monitor off Abx  --stable for tele

## 2023-12-22 NOTE — PROGRESS NOTE ADULT - SUBJECTIVE AND OBJECTIVE BOX
Date/Time Patient Seen:  		  Referring MD:   Data Reviewed	       Patient is a 73y old  Male who presents with a chief complaint of resp fail (21 Dec 2023 14:01)      Subjective/HPI     PAST MEDICAL & SURGICAL HISTORY:  Essential hypertension    Gastroesophageal reflux disease, esophagitis presence not specified    HTN (hypertension)    Hyperlipidemia    CAD (coronary artery disease)  stents x 2    No significant past surgical history    No significant past surgical history    CAD S/P percutaneous coronary angioplasty  with ANAHI          Medication list         MEDICATIONS  (STANDING):  amLODIPine   Tablet 10 milliGRAM(s) Oral daily  aspirin enteric coated 81 milliGRAM(s) Oral daily  atenolol  Tablet 50 milliGRAM(s) Oral daily  atorvastatin 20 milliGRAM(s) Oral at bedtime  folic acid 1 milliGRAM(s) Oral daily  furosemide   Injectable 80 milliGRAM(s) IV Push daily  hydrALAZINE 25 milliGRAM(s) Oral three times a day  influenza  Vaccine (HIGH DOSE) 0.7 milliLiter(s) IntraMuscular once  rivaroxaban 15 milliGRAM(s) Oral with dinner    MEDICATIONS  (PRN):         Vitals log        ICU Vital Signs Last 24 Hrs  T(C): 36.8 (21 Dec 2023 23:48), Max: 37 (21 Dec 2023 15:30)  T(F): 98.2 (21 Dec 2023 23:48), Max: 98.6 (21 Dec 2023 15:30)  HR: 126 (22 Dec 2023 03:00) (54 - 126)  BP: 134/91 (22 Dec 2023 03:00) (108/62 - 182/85)  BP(mean): 108 (22 Dec 2023 03:00) (79 - 121)  ABP: --  ABP(mean): --  RR: 23 (22 Dec 2023 03:00) (18 - 24)  SpO2: 95% (22 Dec 2023 03:00) (89% - 98%)    O2 Parameters below as of 21 Dec 2023 06:00  Patient On (Oxygen Delivery Method): BiPAP/CPAP                 Input and Output:  I&O's Detail    20 Dec 2023 07:01  -  21 Dec 2023 07:00  --------------------------------------------------------  IN:    Nitroglycerin: 3 mL  Total IN: 3 mL    OUT:    Voided (mL): 200 mL  Total OUT: 200 mL    Total NET: -197 mL      21 Dec 2023 07:01  -  22 Dec 2023 05:28  --------------------------------------------------------  IN:    Nitroglycerin: 7.5 mL  Total IN: 7.5 mL    OUT:    Voided (mL): 1000 mL  Total OUT: 1000 mL    Total NET: -992.5 mL          Lab Data                        14.3   17.69 )-----------( 257      ( 21 Dec 2023 09:45 )             44.0     12-21    139  |  111<H>  |  38<H>  ----------------------------<  156<H>  4.0   |  20<L>  |  2.00<H>    Ca    8.2<L>      21 Dec 2023 04:28  Mg     1.9     12-20    TPro  7.2  /  Alb  3.6  /  TBili  0.5  /  DBili  x   /  AST  18  /  ALT  33  /  AlkPhos  115  12-20    ABG - ( 21 Dec 2023 03:35 )  pH, Arterial: 7.31  pH, Blood: x     /  pCO2: 37    /  pO2: 87    / HCO3: 19    / Base Excess: -7.7  /  SaO2: 97.8                    Review of Systems	      Objective     Physical Examination    heart s1s2  lung dc BS  head nc      Pertinent Lab findings & Imaging      Molly:  NO   Adequate UO     I&O's Detail    20 Dec 2023 07:01  -  21 Dec 2023 07:00  --------------------------------------------------------  IN:    Nitroglycerin: 3 mL  Total IN: 3 mL    OUT:    Voided (mL): 200 mL  Total OUT: 200 mL    Total NET: -197 mL      21 Dec 2023 07:01  -  22 Dec 2023 05:28  --------------------------------------------------------  IN:    Nitroglycerin: 7.5 mL  Total IN: 7.5 mL    OUT:    Voided (mL): 1000 mL  Total OUT: 1000 mL    Total NET: -992.5 mL               Discussed with:     Cultures:	        Radiology

## 2023-12-22 NOTE — PROGRESS NOTE ADULT - ASSESSMENT
Patient is a 73yoM with a PMH of CAD sp PCI/ANAHI x 2 to LAD/Ramus 2016, MI 2016, HTN, HLD, AF on AC, CKD, stress echo 2018 EF 59% who presents to ED BIBEMS with SOB, JORDAN and congestion which has worsened in last day.    - no sign of acute ischemia and troponins are negative  - Continue ASA, statin     - Patient with mild edema with b/l crackles on exam  - CXR significant for b/l infiltrates --> PNA vs pulmonary edema   - TTE 2018: EF 59%  - F/u repeat TTE   - Continue IV Lasix 80mg daily   - Continue to keep net negative daily   - BP improved, now off nitro gtt  - cont hydralazine and amlodipine  - titrate down 02 as above, now on 5 L this am    - in af with fast rates this am  - on atenolol 50 bid, though if rates remain suboptimal, will change this to metoprolol 50 q8hr.  - cont ac with xarelto    will follow with you

## 2023-12-22 NOTE — PROGRESS NOTE ADULT - SUBJECTIVE AND OBJECTIVE BOX
Date of Service 12-22-23 @ 15:09    Patient is a 73y old  Male who presents with a chief complaint of Pneumonia due to infectious organism     (22 Dec 2023 14:10)      INTERVAL /OVERNIGHT EVENTS: feeling better, off bipap    MEDICATIONS  (STANDING):  amLODIPine   Tablet 10 milliGRAM(s) Oral daily  aspirin enteric coated 81 milliGRAM(s) Oral daily  atenolol  Tablet 50 milliGRAM(s) Oral two times a day  atorvastatin 40 milliGRAM(s) Oral at bedtime  folic acid 1 milliGRAM(s) Oral daily  furosemide   Injectable 80 milliGRAM(s) IV Push daily  hydrALAZINE 25 milliGRAM(s) Oral three times a day  influenza  Vaccine (HIGH DOSE) 0.7 milliLiter(s) IntraMuscular once  rivaroxaban 15 milliGRAM(s) Oral with dinner    MEDICATIONS  (PRN):      Allergies    No Known Allergies    Intolerances        REVIEW OF SYSTEMS:  CONSTITUTIONAL: No fever, weight loss, or fatigue  EYES: No eye pain, visual disturbances, or discharge  ENMT:  No difficulty hearing, tinnitus, vertigo; No sinus or throat pain  NECK: No pain or stiffness  RESPIRATORY: No cough, wheezing, chills or hemoptysis; + shortness of breath  CARDIOVASCULAR: No chest pain, palpitations, dizziness, or leg swelling  GASTROINTESTINAL: No abdominal or epigastric pain. No nausea, vomiting, or hematemesis; No diarrhea or constipation. No melena or hematochezia.  GENITOURINARY: No dysuria, frequency, hematuria, or incontinence  NEUROLOGICAL: No headaches, memory loss, loss of strength, numbness, or tremors  SKIN: No itching, burning, rashes, or lesions   LYMPH NODES: No enlarged glands  ENDOCRINE: No heat or cold intolerance; No hair loss; No polydipsia or polyuria  MUSCULOSKELETAL: No joint pain or swelling; No muscle, back, or extremity pain  PSYCHIATRIC: No depression, anxiety, mood swings, or difficulty sleeping  HEME/LYMPH: No easy bruising, or bleeding gums  ALLERGY AND IMMUNOLOGIC: No hives or eczema    Vital Signs Last 24 Hrs  T(C): 37 (22 Dec 2023 12:00), Max: 37 (21 Dec 2023 15:30)  T(F): 98.6 (22 Dec 2023 12:00), Max: 98.6 (21 Dec 2023 15:30)  HR: 102 (22 Dec 2023 12:00) (59 - 128)  BP: 128/71 (22 Dec 2023 12:00) (102/60 - 163/76)  BP(mean): 94 (22 Dec 2023 12:00) (75 - 114)  RR: 20 (22 Dec 2023 12:00) (18 - 24)  SpO2: 97% (22 Dec 2023 12:00) (91% - 98%)    Parameters below as of 22 Dec 2023 12:00  Patient On (Oxygen Delivery Method): nasal cannula w/ humidification  O2 Flow (L/min): 2      PHYSICAL EXAM:  GENERAL: NAD, well-groomed, well-developed  HEAD:  Atraumatic, Normocephalic  EYES: EOMI, PERRLA, conjunctiva and sclera clear  ENMT: No tonsillar erythema, exudates, or enlargement; Moist mucous membranes, Good dentition, No lesions  NECK: Supple, No JVD, Normal thyroid  NERVOUS SYSTEM:  Alert & Oriented X3, Good concentration; Motor Strength 5/5 B/L upper and lower extremities; DTRs 2+ intact and symmetric  CHEST/LUNG: Clear to auscultation bilaterally; No rales, rhonchi, wheezing, or rubs  HEART: Regular rate and rhythm; No murmurs, rubs, or gallops  ABDOMEN: Soft, Nontender, Nondistended; Bowel sounds present  EXTREMITIES:  2+ Peripheral Pulses, No clubbing, cyanosis, or edema  LYMPH: No lymphadenopathy noted  SKIN: No rashes or lesions    LABS:                        13.8   12.48 )-----------( 241      ( 22 Dec 2023 05:30 )             43.1     22 Dec 2023 05:30    140    |  109    |  33     ----------------------------<  113    3.9     |  24     |  1.80     Ca    8.4        22 Dec 2023 05:30  Phos  2.6       22 Dec 2023 05:30  Mg     2.3       22 Dec 2023 05:30    TPro  7.0    /  Alb  3.2    /  TBili  1.3    /  DBili  x      /  AST  19     /  ALT  27     /  AlkPhos  90     22 Dec 2023 05:30    PT/INR - ( 22 Dec 2023 05:30 )   PT: 21.4 sec;   INR: 1.86 ratio         PTT - ( 22 Dec 2023 05:30 )  PTT:37.3 sec  Urinalysis Basic - ( 22 Dec 2023 05:30 )    Color: x / Appearance: x / SG: x / pH: x  Gluc: 113 mg/dL / Ketone: x  / Bili: x / Urobili: x   Blood: x / Protein: x / Nitrite: x   Leuk Esterase: x / RBC: x / WBC x   Sq Epi: x / Non Sq Epi: x / Bacteria: x      CAPILLARY BLOOD GLUCOSE          RADIOLOGY & ADDITIONAL TESTS:    Notes Reviewed:  [x ] YES  [ ] NO    Care Discussed with Consultants/Other Providers x[ ] YES  [ ] NO

## 2023-12-22 NOTE — CARE COORDINATION ASSESSMENT. - NSCAREPROVIDERS_GEN_ALL_CORE_FT
CARE PROVIDERS:  Accepting Physician: Claudy Real  Access Services: Avtar Watters  Administration: Giovanny Kasper  Administration: Celestine Freeman  Admitting: Claudy Real  Attending: Claudy Real  Case Management: Tia Garcia  Consultant: Juanpablo Cotton  Consultant: Marva Sanabria  Consultant: Ezekiel Gillespie  Consultant: Feliz Oates  Consultant: Blanco Velazquez  Consultant: Jarocho Carney  Consultant: Maggie Colin  Covering Team: Perlman, Daryl  ED Attending: Daria Fish  ED Nurse: Mónica Liang  Nurse: Lauren Walker  Nurse: Junie Connors  Nurse: Lenka Antoine  Nurse: Theresa Alaniz  Nurse: June Prince  Ordered: ServiceAccount, SCMMLM  Ordered: ADM, User  Outpatient Provider: Ezekiel Gillespie  Outpatient Provider: Amico, Frank  Override: Judy Maldonado  Override: Tashia Diaz  Override: June Prince  Primary Team: Yury Castanon  Primary Team: Zoey Vu  Primary Team: Herberth Holbrook  Primary Team: Mirna Up  Primary Team: Severino Naylor  Primary Team: Nela Davison  Primary Team: Charito Isbell  Primary Team: Elie Muñiz  Registered Dietitian: Laura Ford  Registered Dietitian: Sommer Alberts  Respiratory Therapy: Glenn John  Respiratory Therapy: Madhav Henry  : Purnima Mantilla  Student: Sylvester Delgadillo  Team: [place], Team  Team: [facility] [employer] TCM, Team  UR// Supp. Assoc.: Judy Maldonado  UR// Supp. Assoc.: Mirna Alarcon   CARE PROVIDERS:  Accepting Physician: Claudy Real  Access Services: Avtar Watters  Administration: Giovanny Kasper  Administration: Celestine Freeman  Admitting: Claudy Real  Attending: Claudy Real  Case Management: Tia Garcia  Consultant: Juanpablo Cotton  Consultant: Marva Sanabria  Consultant: Ezekiel Gillespie  Consultant: Feliz Oates  Consultant: Blanco Velazquez  Consultant: Jarocho Carney  Consultant: Maggie Colin  Covering Team: Perlman, Daryl  ED Attending: Daria Fish  ED Nurse: Mónica Liang  Nurse: Lauren Walker  Nurse: Junie Connors  Nurse: Lenka Antoine  Nurse: Theresa Alaniz  Nurse: June Prince  Ordered: ServiceAccount, SCMMLM  Ordered: ADM, User  Outpatient Provider: Ezekiel Gillespie  Outpatient Provider: Amico, Frank  Override: Juyd Maldonado  Override: Tashia Diaz  Override: June Prince  Primary Team: Yury Castanon  Primary Team: Zoey Vu  Primary Team: Herberth Holbrook  Primary Team: Mirna Up  Primary Team: Severino Naylor  Primary Team: Nela Davison  Primary Team: Charito Isbell  Primary Team: Elie Muñiz  Registered Dietitian: Laura Ford  Registered Dietitian: Sommer Alberts  Respiratory Therapy: Glenn John  Respiratory Therapy: Madhav Henry  : Purnima Mantilla  Student: Sylvester Delgadillo  Team: [place], Team  Team: [facility] [employer] TCM, Team  UR// Supp. Assoc.: Judy Maldonado  UR// Supp. Assoc.: Mirna Alarcon

## 2023-12-22 NOTE — PROGRESS NOTE ADULT - ASSESSMENT
73M with PMHx CAD sp PCI/ANAHI x 2 to LAD/Ramus 2016, MI 2016, HTN, HLD, AF on AC, CKD, stress echo 2018 EF 59%, who presented with SOB, JORDAN and congestion which has worsened in last day. Found to have acute hypoxic respiratory failure 2/2 acute decompensated heart failure. CXR appears more consistent with CHF than pneumonia.     Appears improved from respiratory standpoint, stable off antibiotics. No fever and leukocytosis improving. Urine legionella antigen negative. Blood cultures currently no growth.     #Acute hypoxic respiratory failure  #Leukocytosis  #Positive staph aureus nasal PCR    -observe off antibiotics  -nasal mupirocin per hospital protocol  -follow blood cultures to completion  -monitor WBC    Marva Sanabria MD  Division of Infectious Diseases   Cell [phone] between 8am and 6pm   After 6pm and weekends please call ID service at [phone].     35 minutes spent on total encounter assessing patient, examination, chart review, counseling and coordinating care by the attending physician/nurse/care manager.        73M with PMHx CAD sp PCI/ANAHI x 2 to LAD/Ramus 2016, MI 2016, HTN, HLD, AF on AC, CKD, stress echo 2018 EF 59%, who presented with SOB, JORDAN and congestion which has worsened in last day. Found to have acute hypoxic respiratory failure 2/2 acute decompensated heart failure. CXR appears more consistent with CHF than pneumonia.     Appears improved from respiratory standpoint, stable off antibiotics. No fever and leukocytosis improving. Urine legionella antigen negative. Blood cultures currently no growth.     #Acute hypoxic respiratory failure  #Leukocytosis  #Positive staph aureus nasal PCR    -observe off antibiotics  -nasal mupirocin per hospital protocol  -follow blood cultures to completion  -monitor WBC    Marva Sanabria MD  Division of Infectious Diseases   Cell [phone] between 8am and 6pm   After 6pm and weekends please call ID service at [phone].     35 minutes spent on total encounter assessing patient, examination, chart review, counseling and coordinating care by the attending physician/nurse/care manager.

## 2023-12-22 NOTE — PROGRESS NOTE ADULT - ASSESSMENT
Pt is a 72y/o M with PMHx CAD, MI s/p PCI/ANAHI x2 to LAD/Ramus 2016, AF on Xarelto, CKD, HTN, HLD who presented to the ED with SOB and JORDAN, admitted to the ICU for acute hypoxemic respiratory failure and HTN urgency.    Neuro  - answers questions and follows commands appropriately  - no active issues    CV  - patient with HTN urgency upon admission, placed on nitro gtt with improvement  - now off nitro gtt, will start hydralazine 25mg TID PO, norvasc 10mg per cardio recs  - pt with CXR suggestive of CHF. s/p Lasix 80mg IVP x1  - cont Lasix 80mg IVP daily  - TTE- LV systolic function is low-normal with a LVEF estimated at 50-55%, Mild mitral, aortic regurgitation, and trace tricupsid regurgiation , Small pericardial effusion noted adjacent to the posterior left ventricle  - hx afib on Xarelto, c/w atenolol 50 mg BID and renally adjusted xarelto, pt in NSR  - cardiology following  - hx of CAD- c/q ASA, atorvastatin, and atenolol    Pulm  - Pt with acute hypoxic respiratory failure on admission, placed on BiPAP with improvement in saturation  - CXR suggestive of CHF, cont lasix 80mg IV daily  - pt now off BiPAP, saturating well on NC  - RVP negative  - given lower likelihood of PNA, d/c'd antibiotics      GI  - DASH/TLC diet  - no active issues    Renal  - Pt with LEOBARDO on CKD, baseline appears to be 1.5  - Cr this AM 1.8, will cont to monitor  - strict I/Os  - avoid MAP <65  - F/U on urine Na, Cr, osmolality  - renally adjust medications  - avoid nephrotoxic medications    Heme  - H/H stable, no signs of active bleeding  - cont home Xarelto with renal adjustment  - SCDs for DVT ppx    ID  - pt afebrile, + leukocytosis  - RVP negative  - f/u Bcx x2  - Trend temp curve and WBC    Endo  - BS stable  - no active issues    DISPO  - FULL CODE           Pt is a 74y/o M with PMHx CAD, MI s/p PCI/ANAHI x2 to LAD/Ramus 2016, AF on Xarelto, CKD, HTN, HLD who presented to the ED with SOB and JORDAN, admitted to the ICU for acute hypoxemic respiratory failure and HTN urgency.    Neuro  - answers questions and follows commands appropriately  - no active issues    CV  - patient with HTN urgency upon admission, placed on nitro gtt with improvement  - now off nitro gtt, will start hydralazine 25mg TID PO, norvasc 10mg per cardio recs  - pt with CXR suggestive of CHF. s/p Lasix 80mg IVP x1  - cont Lasix 80mg IVP daily  - TTE- LV systolic function is low-normal with a LVEF estimated at 50-55%, Mild mitral, aortic regurgitation, and trace tricupsid regurgiation , Small pericardial effusion noted adjacent to the posterior left ventricle  - hx afib on Xarelto, c/w atenolol 50 mg BID and renally adjusted xarelto, pt in NSR  - cardiology following  - hx of CAD- c/q ASA, atorvastatin, and atenolol    Pulm  - Pt with acute hypoxic respiratory failure on admission, placed on BiPAP with improvement in saturation  - CXR suggestive of CHF, cont lasix 80mg IV daily  - pt now off BiPAP, saturating well on NC  - RVP negative  - given lower likelihood of PNA, d/c'd antibiotics      GI  - DASH/TLC diet  - no active issues    Renal  - Pt with LEOBARDO on CKD, baseline appears to be 1.5  - Cr this AM 1.8, will cont to monitor  - strict I/Os  - avoid MAP <65  - F/U on urine Na, Cr, osmolality  - renally adjust medications  - avoid nephrotoxic medications    Heme  - H/H stable, no signs of active bleeding  - cont home Xarelto with renal adjustment  - SCDs for DVT ppx    ID  - pt afebrile, + leukocytosis  - RVP negative  - f/u Bcx x2  - Trend temp curve and WBC    Endo  - BS stable  - no active issues    DISPO  - FULL CODE           Pt is a 72y/o M with PMHx CAD, MI s/p PCI/ANAHI x2 to LAD/Ramus 2016, AF on Xarelto, CKD, HTN, HLD who presented to the ED with SOB and JORDAN, admitted to the ICU for acute hypoxemic respiratory failure and HTN urgency.    Neuro  - answers questions and follows commands appropriately  - no active issues    CV  #HTN  - patient with HTN urgency upon admission which required nitro gtt, now discontinued  - continue hydralazine 25mg TID PO, norvasc 10mg per cardio recs    #Volume overload  - pt with CXR suggestive of CHF, cont Lasix 80mg IVP daily  - TTE- LV systolic function is low-normal with a LVEF estimated at 50-55%, Mild mitral, aortic regurgitation, and trace tricupsid regurgiation , Small pericardial effusion noted adjacent to the posterior left ventricle    #A-fib  - hx afib on Xarelto, pt with episode of afib with RVR overnight, s/p lopressor 5 IV x1 with improvement  - c/w atenolol 50 mg, increae to home BID; cont renally adjusted xarelto    #CAD  - hx of CAD- c/w ASA, atorvastatin, atenolol  - cardiology following    Pulm  - Pt with acute hypoxic respiratory failure on admission, placed on BiPAP with improvement in saturation  - CXR suggestive of CHF, cont lasix 80mg IV daily  - pt now off BiPAP, saturating well on NC  - RVP negative  - given lower likelihood of PNA, no need for abx at this time      GI  - DASH/TLC diet  - no active issues    Renal  - Pt with LEOBARDO on CKD, baseline appears to be 1.5  - Cr downtrending, will cont to monitor  - strict I/Os  - avoid MAP <65  - F/U on urine Na, Cr, osmolality  - renally adjust medications  - avoid nephrotoxic medications    Heme  - H/H stable, no signs of active bleeding  - cont home Xarelto with renal adjustment    ID  - pt afebrile, + leukocytosis  - RVP negative  - f/u Bcx x2 - NGTD 24hrs  - Trend temp curve and WBC    Endo  - BS stable  - no active issues    DISPO  - FULL CODE  - transfer to telemetry

## 2023-12-22 NOTE — DIETITIAN INITIAL EVALUATION ADULT - ORAL INTAKE PTA/DIET HISTORY
Pt reports follows no special diet at home, eats generally 2 meals and a snack at home. Denies wt changes, -195#.  Pt TT ICU to unit floor today. Ate 1/2 lunch tray observed.

## 2023-12-22 NOTE — PROGRESS NOTE ADULT - SUBJECTIVE AND OBJECTIVE BOX
Patient is a 73y old  Male who presents with a chief complaint of resp fail (21 Dec 2023 14:01)       HPI: 72 Y/O CAD with stents, afib, hypertension, hyperlipidemia here complaining of heavy breathing. patient reports yesterday he had a mild cough then about 45 minutes prior to arrival patient developed difficulty breathing.  Has not seen nephrologist in the past.  States he is urinating.  NO N/V.  SOB improving.       PAST MEDICAL & SURGICAL HISTORY:  Essential hypertension      Gastroesophageal reflux disease, esophagitis presence not specified      HTN (hypertension)      Hyperlipidemia      CAD (coronary artery disease)  stents x 2      CAD S/P percutaneous coronary angioplasty  with ANAHI           FAMILY HISTORY:  FH: heart disease  father and mother     [place]    Social History:Non smoker    MEDICATIONS  (STANDING):  amLODIPine   Tablet 10 milliGRAM(s) Oral daily  aspirin enteric coated 81 milliGRAM(s) Oral daily  atenolol  Tablet 50 milliGRAM(s) Oral daily  atorvastatin 20 milliGRAM(s) Oral at bedtime  folic acid 1 milliGRAM(s) Oral daily  furosemide   Injectable 80 milliGRAM(s) IV Push daily  hydrALAZINE 25 milliGRAM(s) Oral three times a day  influenza  Vaccine (HIGH DOSE) 0.7 milliLiter(s) IntraMuscular once  nitroglycerin  Infusion 5 MICROgram(s)/Min (1.5 mL/Hr) IV Continuous <Continuous>  rivaroxaban 15 milliGRAM(s) Oral with dinner    MEDICATIONS  (PRN):   Meds reviewed    Allergies    No Known Allergies    Intolerances         REVIEW OF SYSTEMS:    as above    ICU Vital Signs Last 24 Hrs  T(C): 37 (22 Dec 2023 12:00), Max: 37 (21 Dec 2023 15:30)  T(F): 98.6 (22 Dec 2023 12:00), Max: 98.6 (21 Dec 2023 15:30)  HR: 102 (22 Dec 2023 12:00) (59 - 128)  BP: 128/71 (22 Dec 2023 12:00) (102/60 - 163/76)  BP(mean): 94 (22 Dec 2023 12:00) (75 - 114)  ABP: --  ABP(mean): --  RR: 20 (22 Dec 2023 12:00) (18 - 24)  SpO2: 97% (22 Dec 2023 12:00) (89% - 98%)    O2 Parameters below as of 22 Dec 2023 12:00  Patient On (Oxygen Delivery Method): nasal cannula w/ humidification  O2 Flow (L/min): 2          PHYSICAL EXAM:    GENERAL: NAD  HEAD:  Atraumatic, Normocephalic  EYES: EOMI, conjunctiva and sclera clear  ENMT: No Drainage from nares, No drainage from ears  NERVOUS SYSTEM:  Awake and Alert  CHEST/LUNG: + rales  EXTREMITIES:  No Edema  SKIN: No rashes No obvious ecchymosis      LABS:                                      13.8   12.48 )-----------( 241      ( 22 Dec 2023 05:30 )             43.1     12-    140  |  109<H>  |  33<H>  ----------------------------<  113<H>  3.9   |  24  |  1.80<H>    Ca    8.4<L>      22 Dec 2023 05:30  Phos  2.6     12  Mg     2.3     12    TPro  7.0  /  Alb  3.2<L>  /  TBili  1.3<H>  /  DBili  x   /  AST  19  /  ALT  27  /  AlkPhos  90  12-22    PT/INR - ( 22 Dec 2023 05:30 )   PT: 21.4 sec;   INR: 1.86 ratio         PTT - ( 22 Dec 2023 05:30 )  PTT:37.3 sec  Urinalysis Basic - ( 22 Dec 2023 05:30 )    Color: x / Appearance: x / SG: x / pH: x  Gluc: 113 mg/dL / Ketone: x  / Bili: x / Urobili: x   Blood: x / Protein: x / Nitrite: x   Leuk Esterase: x / RBC: x / WBC x   Sq Epi: x / Non Sq Epi: x / Bacteria: x        ABG - ( 21 Dec 2023 03:35 )  pH, Arterial: 7.31  pH, Blood: x     /  pCO2: 37    /  pO2: 87    / HCO3: 19    / Base Excess: -7.7  /  SaO2: 97.8                          RADIOLOGY & ADDITIONAL TESTS:   Patient is a 73y old  Male who presents with a chief complaint of resp fail (21 Dec 2023 14:01)       HPI: 74 Y/O CAD with stents, afib, hypertension, hyperlipidemia here complaining of heavy breathing. patient reports yesterday he had a mild cough then about 45 minutes prior to arrival patient developed difficulty breathing.  Has not seen nephrologist in the past.  States he is urinating.  NO N/V.  SOB improving.       PAST MEDICAL & SURGICAL HISTORY:  Essential hypertension      Gastroesophageal reflux disease, esophagitis presence not specified      HTN (hypertension)      Hyperlipidemia      CAD (coronary artery disease)  stents x 2      CAD S/P percutaneous coronary angioplasty  with ANAHI           FAMILY HISTORY:  FH: heart disease  father and mother     [place]    Social History:Non smoker    MEDICATIONS  (STANDING):  amLODIPine   Tablet 10 milliGRAM(s) Oral daily  aspirin enteric coated 81 milliGRAM(s) Oral daily  atenolol  Tablet 50 milliGRAM(s) Oral daily  atorvastatin 20 milliGRAM(s) Oral at bedtime  folic acid 1 milliGRAM(s) Oral daily  furosemide   Injectable 80 milliGRAM(s) IV Push daily  hydrALAZINE 25 milliGRAM(s) Oral three times a day  influenza  Vaccine (HIGH DOSE) 0.7 milliLiter(s) IntraMuscular once  nitroglycerin  Infusion 5 MICROgram(s)/Min (1.5 mL/Hr) IV Continuous <Continuous>  rivaroxaban 15 milliGRAM(s) Oral with dinner    MEDICATIONS  (PRN):   Meds reviewed    Allergies    No Known Allergies    Intolerances         REVIEW OF SYSTEMS:    as above    ICU Vital Signs Last 24 Hrs  T(C): 37 (22 Dec 2023 12:00), Max: 37 (21 Dec 2023 15:30)  T(F): 98.6 (22 Dec 2023 12:00), Max: 98.6 (21 Dec 2023 15:30)  HR: 102 (22 Dec 2023 12:00) (59 - 128)  BP: 128/71 (22 Dec 2023 12:00) (102/60 - 163/76)  BP(mean): 94 (22 Dec 2023 12:00) (75 - 114)  ABP: --  ABP(mean): --  RR: 20 (22 Dec 2023 12:00) (18 - 24)  SpO2: 97% (22 Dec 2023 12:00) (89% - 98%)    O2 Parameters below as of 22 Dec 2023 12:00  Patient On (Oxygen Delivery Method): nasal cannula w/ humidification  O2 Flow (L/min): 2          PHYSICAL EXAM:    GENERAL: NAD  HEAD:  Atraumatic, Normocephalic  EYES: EOMI, conjunctiva and sclera clear  ENMT: No Drainage from nares, No drainage from ears  NERVOUS SYSTEM:  Awake and Alert  CHEST/LUNG: + rales  EXTREMITIES:  No Edema  SKIN: No rashes No obvious ecchymosis      LABS:                                      13.8   12.48 )-----------( 241      ( 22 Dec 2023 05:30 )             43.1     12-    140  |  109<H>  |  33<H>  ----------------------------<  113<H>  3.9   |  24  |  1.80<H>    Ca    8.4<L>      22 Dec 2023 05:30  Phos  2.6     12  Mg     2.3     12    TPro  7.0  /  Alb  3.2<L>  /  TBili  1.3<H>  /  DBili  x   /  AST  19  /  ALT  27  /  AlkPhos  90  12-22    PT/INR - ( 22 Dec 2023 05:30 )   PT: 21.4 sec;   INR: 1.86 ratio         PTT - ( 22 Dec 2023 05:30 )  PTT:37.3 sec  Urinalysis Basic - ( 22 Dec 2023 05:30 )    Color: x / Appearance: x / SG: x / pH: x  Gluc: 113 mg/dL / Ketone: x  / Bili: x / Urobili: x   Blood: x / Protein: x / Nitrite: x   Leuk Esterase: x / RBC: x / WBC x   Sq Epi: x / Non Sq Epi: x / Bacteria: x        ABG - ( 21 Dec 2023 03:35 )  pH, Arterial: 7.31  pH, Blood: x     /  pCO2: 37    /  pO2: 87    / HCO3: 19    / Base Excess: -7.7  /  SaO2: 97.8                          RADIOLOGY & ADDITIONAL TESTS:

## 2023-12-22 NOTE — DIETITIAN INITIAL EVALUATION ADULT - PERTINENT LABORATORY DATA
12-22    140  |  109<H>  |  33<H>  ----------------------------<  113<H>  3.9   |  24  |  1.80<H>    Ca    8.4<L>      22 Dec 2023 05:30  Phos  2.6     12-22  Mg     2.3     12-22    TPro  7.0  /  Alb  3.2<L>  /  TBili  1.3<H>  /  DBili  x   /  AST  19  /  ALT  27  /  AlkPhos  90  12-22  A1C with Estimated Average Glucose Result: 5.6 % (12-21-23 @ 09:45)

## 2023-12-22 NOTE — CARE COORDINATION ASSESSMENT. - OTHER PERTINENT REFERRAL INFORMATION
CM met with patient  at bedside to explain role and transitions of care. Patient lives with wife in a private house with 3 steps to get in, 12 to basement and 12 to the second floor.  Patient was  independent prior to admission.  No caregiver identified (self), no home care or DMEs.  Son will transport patient home when ready to be discharge.  Patient has been identified as a CMS STAR patient.  TCM yellow card explained and given to patient. Patient declined [facility]MC and home care. CM explained  home care expectation, process, insurance provision and home safety with good understanding but still declined.  Resource folder left at bedside.  All questions answered to the best of my abilities.  CM remains available throughout the hospital stay.

## 2023-12-22 NOTE — PROGRESS NOTE ADULT - SUBJECTIVE AND OBJECTIVE BOX
[facility] - Roxi Pardo, Rommel Gillespie, Cristi Velazquez  Office Number:  [phone]    Patient resting comfortably in bed in NAD.  Laying flat with no respiratory distress.  No complaints of chest pain, dyspnea, palpitations, PND, or orthopnea.  breathing better this am, still with some edema  af with fast rates  on 5 LNC    ROS: negative unless otherwise mentioned.    Telemetry:  af    MEDICATIONS  (STANDING):  amLODIPine   Tablet 10 milliGRAM(s) Oral daily  aspirin enteric coated 81 milliGRAM(s) Oral daily  atenolol  Tablet 50 milliGRAM(s) Oral two times a day  atorvastatin 40 milliGRAM(s) Oral at bedtime  folic acid 1 milliGRAM(s) Oral daily  furosemide   Injectable 80 milliGRAM(s) IV Push daily  hydrALAZINE 25 milliGRAM(s) Oral three times a day  influenza  Vaccine (HIGH DOSE) 0.7 milliLiter(s) IntraMuscular once  rivaroxaban 15 milliGRAM(s) Oral with dinner    MEDICATIONS  (PRN):      Allergies    No Known Allergies    Intolerances        Vital Signs Last 24 Hrs  T(C): 36.8 (22 Dec 2023 05:35), Max: 37 (21 Dec 2023 15:30)  T(F): 98.3 (22 Dec 2023 05:35), Max: 98.6 (21 Dec 2023 15:30)  HR: 109 (22 Dec 2023 11:00) (57 - 128)  BP: 114/81 (22 Dec 2023 11:00) (102/60 - 163/76)  BP(mean): 94 (22 Dec 2023 11:00) (75 - 114)  RR: 22 (22 Dec 2023 11:00) (18 - 24)  SpO2: 97% (22 Dec 2023 11:00) (89% - 98%)    Parameters below as of 22 Dec 2023 11:00  Patient On (Oxygen Delivery Method): nasal cannula w/ humidification  O2 Flow (L/min): 3      I&O's Summary    21 Dec 2023 07:01  -  22 Dec 2023 07:00  --------------------------------------------------------  IN: 7.5 mL / OUT: 1900 mL / NET: -1892.5 mL    22 Dec 2023 07:01  -  22 Dec 2023 11:54  --------------------------------------------------------  IN: 0 mL / OUT: 200 mL / NET: -200 mL        ON EXAM:    General: NAD, awake and alert, oriented x 3, on 5 L NC  HEENT: Mucous membranes are moist, anicteric  Lungs: Non-labored, breath sounds are clear bilaterally, No wheezing, rales or rhonchi  Cardiovascular: irregular, S1 and S2, no murmurs, rubs, or gallops  Gastrointestinal: Bowel Sounds present, soft, nontender.   Lymph: mild peripheral edema. No lymphadenopathy.  Skin: No rashes or ulcers  Psych:  Mood & affect appropriate    LABS: All Labs Reviewed:                        13.8   12.48 )-----------( 241      ( 22 Dec 2023 05:30 )             43.1                         14.3   17.69 )-----------( 257      ( 21 Dec 2023 09:45 )             44.0                         13.5   13.31 )-----------( 252      ( 20 Dec 2023 22:50 )             42.6     22 Dec 2023 05:30    140    |  109    |  33     ----------------------------<  113    3.9     |  24     |  1.80   21 Dec 2023 04:28    139    |  111    |  38     ----------------------------<  156    4.0     |  20     |  2.00   20 Dec 2023 22:50    140    |  112    |  39     ----------------------------<  134    4.0     |  25     |  2.00     Ca    8.4        22 Dec 2023 05:30  Ca    8.2        21 Dec 2023 04:28  Ca    8.5        20 Dec 2023 22:50  Phos  2.6       22 Dec 2023 05:30  Mg     2.3       22 Dec 2023 05:30  Mg     1.9       20 Dec 2023 22:50    TPro  7.0    /  Alb  3.2    /  TBili  1.3    /  DBili  x      /  AST  19     /  ALT  27     /  AlkPhos  90     22 Dec 2023 05:30  TPro  7.2    /  Alb  3.6    /  TBili  0.5    /  DBili  x      /  AST  18     /  ALT  33     /  AlkPhos  115    20 Dec 2023 22:50    PT/INR - ( 22 Dec 2023 05:30 )   PT: 21.4 sec;   INR: 1.86 ratio         PTT - ( 22 Dec 2023 05:30 )  PTT:37.3 sec      Blood Culture: Organism --  Gram Stain Blood -- Gram Stain --  Specimen Source .Blood Blood-Peripheral  Culture-Blood --    Organism --  Gram Stain Blood -- Gram Stain --  Specimen Source .Blood Blood-Peripheral  Culture-Blood --        12-21 @ 05:00  TSH: 3.02     [facility] - Roxi Pardo, Rommel Gillespie, Cristi Velazquez  Office Number:  [phone]    Patient resting comfortably in bed in NAD.  Laying flat with no respiratory distress.  No complaints of chest pain, dyspnea, palpitations, PND, or orthopnea.  breathing better this am, still with some edema  af with fast rates  on 5 LNC    ROS: negative unless otherwise mentioned.    Telemetry:  af    MEDICATIONS  (STANDING):  amLODIPine   Tablet 10 milliGRAM(s) Oral daily  aspirin enteric coated 81 milliGRAM(s) Oral daily  atenolol  Tablet 50 milliGRAM(s) Oral two times a day  atorvastatin 40 milliGRAM(s) Oral at bedtime  folic acid 1 milliGRAM(s) Oral daily  furosemide   Injectable 80 milliGRAM(s) IV Push daily  hydrALAZINE 25 milliGRAM(s) Oral three times a day  influenza  Vaccine (HIGH DOSE) 0.7 milliLiter(s) IntraMuscular once  rivaroxaban 15 milliGRAM(s) Oral with dinner    MEDICATIONS  (PRN):      Allergies    No Known Allergies    Intolerances        Vital Signs Last 24 Hrs  T(C): 36.8 (22 Dec 2023 05:35), Max: 37 (21 Dec 2023 15:30)  T(F): 98.3 (22 Dec 2023 05:35), Max: 98.6 (21 Dec 2023 15:30)  HR: 109 (22 Dec 2023 11:00) (57 - 128)  BP: 114/81 (22 Dec 2023 11:00) (102/60 - 163/76)  BP(mean): 94 (22 Dec 2023 11:00) (75 - 114)  RR: 22 (22 Dec 2023 11:00) (18 - 24)  SpO2: 97% (22 Dec 2023 11:00) (89% - 98%)    Parameters below as of 22 Dec 2023 11:00  Patient On (Oxygen Delivery Method): nasal cannula w/ humidification  O2 Flow (L/min): 3      I&O's Summary    21 Dec 2023 07:01  -  22 Dec 2023 07:00  --------------------------------------------------------  IN: 7.5 mL / OUT: 1900 mL / NET: -1892.5 mL    22 Dec 2023 07:01  -  22 Dec 2023 11:54  --------------------------------------------------------  IN: 0 mL / OUT: 200 mL / NET: -200 mL        ON EXAM:    General: NAD, awake and alert, oriented x 3, on 5 L NC  HEENT: Mucous membranes are moist, anicteric  Lungs: Non-labored, breath sounds are clear bilaterally, No wheezing, rales or rhonchi  Cardiovascular: irregular, S1 and S2, no murmurs, rubs, or gallops  Gastrointestinal: Bowel Sounds present, soft, nontender.   Lymph: mild peripheral edema. No lymphadenopathy.  Skin: No rashes or ulcers  Psych:  Mood & affect appropriate    LABS: All Labs Reviewed:                        13.8   12.48 )-----------( 241      ( 22 Dec 2023 05:30 )             43.1                         14.3   17.69 )-----------( 257      ( 21 Dec 2023 09:45 )             44.0                         13.5   13.31 )-----------( 252      ( 20 Dec 2023 22:50 )             42.6     22 Dec 2023 05:30    140    |  109    |  33     ----------------------------<  113    3.9     |  24     |  1.80   21 Dec 2023 04:28    139    |  111    |  38     ----------------------------<  156    4.0     |  20     |  2.00   20 Dec 2023 22:50    140    |  112    |  39     ----------------------------<  134    4.0     |  25     |  2.00     Ca    8.4        22 Dec 2023 05:30  Ca    8.2        21 Dec 2023 04:28  Ca    8.5        20 Dec 2023 22:50  Phos  2.6       22 Dec 2023 05:30  Mg     2.3       22 Dec 2023 05:30  Mg     1.9       20 Dec 2023 22:50    TPro  7.0    /  Alb  3.2    /  TBili  1.3    /  DBili  x      /  AST  19     /  ALT  27     /  AlkPhos  90     22 Dec 2023 05:30  TPro  7.2    /  Alb  3.6    /  TBili  0.5    /  DBili  x      /  AST  18     /  ALT  33     /  AlkPhos  115    20 Dec 2023 22:50    PT/INR - ( 22 Dec 2023 05:30 )   PT: 21.4 sec;   INR: 1.86 ratio         PTT - ( 22 Dec 2023 05:30 )  PTT:37.3 sec      Blood Culture: Organism --  Gram Stain Blood -- Gram Stain --  Specimen Source .Blood Blood-Peripheral  Culture-Blood --    Organism --  Gram Stain Blood -- Gram Stain --  Specimen Source .Blood Blood-Peripheral  Culture-Blood --        12-21 @ 05:00  TSH: 3.02

## 2023-12-22 NOTE — PROGRESS NOTE ADULT - ASSESSMENT
LEOBARDO on CKD 3  Metabolic Acidosis  HTN Urgency  Dyspnea  CHF    -Baseline Creatinine 1.5  -LEOBARDO likely 2/2 to hypoxic injury  -Will need to tolerate higher creatinine  to cont lasix to achieve euvolemia  -No emergent indication for RRT  -No indication for bicarb at this time and will avoid salt load  -Diuretic will also help improve BP  -Creatinine improving

## 2023-12-22 NOTE — PROGRESS NOTE ADULT - SUBJECTIVE AND OBJECTIVE BOX
[facility]  INFECTIOUS DISEASES - Gael Doherty, [address], [place] [address]  Tel: [phone]     Fax: [phone]  =======================================================    TRUDI COOLEY [id number]    Follow up: No fevers. Seen earlier today. On O2 via nasal cannula. Reports feeling better. Breathing improved. Denies any cough,    Allergies:  No Known Allergies      Antibiotics:  amLODIPine   Tablet 10 milliGRAM(s) Oral daily  aspirin enteric coated 81 milliGRAM(s) Oral daily  atenolol  Tablet 50 milliGRAM(s) Oral two times a day  atorvastatin 40 milliGRAM(s) Oral at bedtime  folic acid 1 milliGRAM(s) Oral daily  furosemide   Injectable 80 milliGRAM(s) IV Push daily  hydrALAZINE 25 milliGRAM(s) Oral three times a day  influenza  Vaccine (HIGH DOSE) 0.7 milliLiter(s) IntraMuscular once  rivaroxaban 15 milliGRAM(s) Oral with dinner       REVIEW OF SYSTEMS:  CONSTITUTIONAL:  No Fever or chills  HEENT:  No sore throat or runny nose.  CARDIOVASCULAR:  No chest pain   RESPIRATORY:  see history  GASTROINTESTINAL:  No nausea, vomiting or diarrhea.  GENITOURINARY:  No dysuria, frequency or urgency  MUSCULOSKELETAL:  no joint aches, no back pain  NEUROLOGIC:  No headache or dizziness  PSYCHIATRIC:  No disorder of thought or mood.     Physical Exam:  ICU Vital Signs Last 24 Hrs  T(C): 37 (22 Dec 2023 12:00), Max: 37 (22 Dec 2023 12:00)  T(F): 98.6 (22 Dec 2023 12:00), Max: 98.6 (22 Dec 2023 12:00)  HR: 102 (22 Dec 2023 12:00) (59 - 128)  BP: 128/71 (22 Dec 2023 12:00) (102/60 - 154/66)  BP(mean): 94 (22 Dec 2023 12:00) (75 - 108)  ABP: --  ABP(mean): --  RR: 20 (22 Dec 2023 12:00) (18 - 24)  SpO2: 97% (22 Dec 2023 12:00) (91% - 98%)    O2 Parameters below as of 22 Dec 2023 12:00  Patient On (Oxygen Delivery Method): nasal cannula w/ humidification  O2 Flow (L/min): 2      GEN: NAD  HEENT: normocephalic and atraumatic.   NECK: Supple.   LUNGS: Normal respiratory effort  HEART: Regular rate and rhythm   ABDOMEN: Soft, nontender, and nondistended.    EXTREMITIES: No leg edema.  NEUROLOGIC: AO x 3  PSYCHIATRIC: Appropriate affect .    Labs:  12-22    140  |  109<H>  |  33<H>  ----------------------------<  113<H>  3.9   |  24  |  1.80<H>    Ca    8.4<L>      22 Dec 2023 05:30  Phos  2.6     12-22  Mg     2.3     12-22    TPro  7.0  /  Alb  3.2<L>  /  TBili  1.3<H>  /  DBili  x   /  AST  19  /  ALT  27  /  AlkPhos  90  12-22                          13.8   12.48 )-----------( 241      ( 22 Dec 2023 05:30 )             43.1     PT/INR - ( 22 Dec 2023 05:30 )   PT: 21.4 sec;   INR: 1.86 ratio         PTT - ( 22 Dec 2023 05:30 )  PTT:37.3 sec  Urinalysis Basic - ( 22 Dec 2023 05:30 )    Color: x / Appearance: x / SG: x / pH: x  Gluc: 113 mg/dL / Ketone: x  / Bili: x / Urobili: x   Blood: x / Protein: x / Nitrite: x   Leuk Esterase: x / RBC: x / WBC x   Sq Epi: x / Non Sq Epi: x / Bacteria: x      LIVER FUNCTIONS - ( 22 Dec 2023 05:30 )  Alb: 3.2 g/dL / Pro: 7.0 g/dL / ALK PHOS: 90 U/L / ALT: 27 U/L / AST: 19 U/L / GGT: x             RECENT CULTURES:  12-21 @ 01:00 .Blood Blood-Peripheral     No growth at 24 hours        12-21 @ 00:50 .Blood Blood-Peripheral     No growth at 24 hours        12-20 @ 22:50          NotDetec        All imaging and data are reviewed.      [facility]  INFECTIOUS DISEASES - Gael Doherty, [address], [place] [address]  Tel: [phone]     Fax: [phone]  =======================================================    TRUDI COOLEY [id number]    Follow up: No fevers. Seen earlier today. On O2 via nasal cannula. Reports feeling better. Breathing improved. Denies any cough,    Allergies:  No Known Allergies      Antibiotics:  amLODIPine   Tablet 10 milliGRAM(s) Oral daily  aspirin enteric coated 81 milliGRAM(s) Oral daily  atenolol  Tablet 50 milliGRAM(s) Oral two times a day  atorvastatin 40 milliGRAM(s) Oral at bedtime  folic acid 1 milliGRAM(s) Oral daily  furosemide   Injectable 80 milliGRAM(s) IV Push daily  hydrALAZINE 25 milliGRAM(s) Oral three times a day  influenza  Vaccine (HIGH DOSE) 0.7 milliLiter(s) IntraMuscular once  rivaroxaban 15 milliGRAM(s) Oral with dinner       REVIEW OF SYSTEMS:  CONSTITUTIONAL:  No Fever or chills  HEENT:  No sore throat or runny nose.  CARDIOVASCULAR:  No chest pain   RESPIRATORY:  see history  GASTROINTESTINAL:  No nausea, vomiting or diarrhea.  GENITOURINARY:  No dysuria, frequency or urgency  MUSCULOSKELETAL:  no joint aches, no back pain  NEUROLOGIC:  No headache or dizziness  PSYCHIATRIC:  No disorder of thought or mood.     Physical Exam:  ICU Vital Signs Last 24 Hrs  T(C): 37 (22 Dec 2023 12:00), Max: 37 (22 Dec 2023 12:00)  T(F): 98.6 (22 Dec 2023 12:00), Max: 98.6 (22 Dec 2023 12:00)  HR: 102 (22 Dec 2023 12:00) (59 - 128)  BP: 128/71 (22 Dec 2023 12:00) (102/60 - 154/66)  BP(mean): 94 (22 Dec 2023 12:00) (75 - 108)  ABP: --  ABP(mean): --  RR: 20 (22 Dec 2023 12:00) (18 - 24)  SpO2: 97% (22 Dec 2023 12:00) (91% - 98%)    O2 Parameters below as of 22 Dec 2023 12:00  Patient On (Oxygen Delivery Method): nasal cannula w/ humidification  O2 Flow (L/min): 2      GEN: NAD  HEENT: normocephalic and atraumatic.   NECK: Supple.   LUNGS: Normal respiratory effort  HEART: Regular rate and rhythm   ABDOMEN: Soft, nontender, and nondistended.    EXTREMITIES: No leg edema.  NEUROLOGIC: AO x 3  PSYCHIATRIC: Appropriate affect .    Labs:  12-22    140  |  109<H>  |  33<H>  ----------------------------<  113<H>  3.9   |  24  |  1.80<H>    Ca    8.4<L>      22 Dec 2023 05:30  Phos  2.6     12-22  Mg     2.3     12-22    TPro  7.0  /  Alb  3.2<L>  /  TBili  1.3<H>  /  DBili  x   /  AST  19  /  ALT  27  /  AlkPhos  90  12-22                          13.8   12.48 )-----------( 241      ( 22 Dec 2023 05:30 )             43.1     PT/INR - ( 22 Dec 2023 05:30 )   PT: 21.4 sec;   INR: 1.86 ratio         PTT - ( 22 Dec 2023 05:30 )  PTT:37.3 sec  Urinalysis Basic - ( 22 Dec 2023 05:30 )    Color: x / Appearance: x / SG: x / pH: x  Gluc: 113 mg/dL / Ketone: x  / Bili: x / Urobili: x   Blood: x / Protein: x / Nitrite: x   Leuk Esterase: x / RBC: x / WBC x   Sq Epi: x / Non Sq Epi: x / Bacteria: x      LIVER FUNCTIONS - ( 22 Dec 2023 05:30 )  Alb: 3.2 g/dL / Pro: 7.0 g/dL / ALK PHOS: 90 U/L / ALT: 27 U/L / AST: 19 U/L / GGT: x             RECENT CULTURES:  12-21 @ 01:00 .Blood Blood-Peripheral     No growth at 24 hours        12-21 @ 00:50 .Blood Blood-Peripheral     No growth at 24 hours        12-20 @ 22:50          NotDetec        All imaging and data are reviewed.

## 2023-12-22 NOTE — PHARMACOTHERAPY INTERVENTION NOTE - COMMENTS
Patient is a 73 year old male currently admitted to the ICU. Discussed with team on rounds. Patient ordered for Atorvastatin 20mg QD. Recommended dose adjustment to 40mg QD to match patients home dose. Accepted and order entered. Patient was noted to be tachycardic. Patient currently ordered for atenolol 50mg QD. Recommended adjusting atenolol 50mg BID to match home dose and treat tachycardia. Accepted and order entered.

## 2023-12-22 NOTE — DIETITIAN INITIAL EVALUATION ADULT - PERTINENT MEDS FT
MEDICATIONS  (STANDING):  amLODIPine   Tablet 10 milliGRAM(s) Oral daily  aspirin enteric coated 81 milliGRAM(s) Oral daily  atenolol  Tablet 50 milliGRAM(s) Oral two times a day  atorvastatin 40 milliGRAM(s) Oral at bedtime  folic acid 1 milliGRAM(s) Oral daily  furosemide   Injectable 80 milliGRAM(s) IV Push daily  hydrALAZINE 25 milliGRAM(s) Oral three times a day  influenza  Vaccine (HIGH DOSE) 0.7 milliLiter(s) IntraMuscular once  rivaroxaban 15 milliGRAM(s) Oral with dinner    MEDICATIONS  (PRN):

## 2023-12-22 NOTE — CARE COORDINATION ASSESSMENT. - NSPASTMEDSURGHISTORY_GEN_ALL_CORE_FT
PAST MEDICAL & SURGICAL HISTORY:  Gastroesophageal reflux disease, esophagitis presence not specified      Essential hypertension      CAD (coronary artery disease)  stents x 2      Hyperlipidemia      HTN (hypertension)      CAD S/P percutaneous coronary angioplasty  with ANAHI

## 2023-12-22 NOTE — PROGRESS NOTE ADULT - SUBJECTIVE AND OBJECTIVE BOX
Interval Events: Patient had an episode of tachycardia () due to atrial fib at 0600, received a dose of metoprolol 5 mg, HR has improved and now 103. Patient seen and examined at beside this morning. Pt feels better, denies any complaints at this time.     Review of Systems:  Constitutional: No fever, chills, fatigue  Neuro: No headache, numbness, weakness  Resp: No cough, wheezing, shortness of breath  CVS: No chest pain, palpitations, leg swelling  GI: No abdominal pain, nausea, vomiting, diarrhea   Msk: No joint pain or swelling  Psych: No depression, anxiety, mood swings    ICU Vital Signs Last 24 Hrs  T(C): 37 (22 Dec 2023 12:00), Max: 37 (21 Dec 2023 15:30)  T(F): 98.6 (22 Dec 2023 12:00), Max: 98.6 (21 Dec 2023 15:30)  HR: 102 (22 Dec 2023 12:00) (59 - 128)  BP: 128/71 (22 Dec 2023 12:00) (102/60 - 163/76)  BP(mean): 94 (22 Dec 2023 12:00) (75 - 114)  ABP: --  ABP(mean): --  RR: 20 (22 Dec 2023 12:00) (18 - 24)  SpO2: 97% (22 Dec 2023 12:00) (89% - 98%)    O2 Parameters below as of 22 Dec 2023 12:00  Patient On (Oxygen Delivery Method): nasal cannula w/ humidification  O2 Flow (L/min): 2            12-21-23 @ 07:01  -  12-22-23 @ 07:00  --------------------------------------------------------  IN: 7.5 mL / OUT: 1900 mL / NET: -1892.5 mL    12-22-23 @ 07:01  -  12-22-23 @ 13:33  --------------------------------------------------------  IN: 0 mL / OUT: 600 mL / NET: -600 mL        CAPILLARY BLOOD GLUCOSE          I&O's Summary    21 Dec 2023 07:01  -  22 Dec 2023 07:00  --------------------------------------------------------  IN: 7.5 mL / OUT: 1900 mL / NET: -1892.5 mL    22 Dec 2023 07:01  -  22 Dec 2023 13:33  --------------------------------------------------------  IN: 0 mL / OUT: 600 mL / NET: -600 mL        Physical Exam:   Gen: resting comfortably seated up in bed  Neuro: answers questions and follows commands appropriately  HEENT: NCAT, sclera clear  Resp: diminished breath sounds b/l, minimal crackles R>L on auscultation  CVS: irregularly irregular rhythm no murmurs   Abd: soft, non-tender, non-distended  Ext: WWP, no LE edema    Meds:    amLODIPine   Tablet Oral  atenolol  Tablet Oral  furosemide   Injectable IV Push  hydrALAZINE Oral    atorvastatin Oral          aspirin enteric coated Oral  rivaroxaban Oral        folic acid Oral    influenza  Vaccine (HIGH DOSE) IntraMuscular                                13.8   12.48 )-----------( 241      ( 22 Dec 2023 05:30 )             43.1       12-22    140  |  109<H>  |  33<H>  ----------------------------<  113<H>  3.9   |  24  |  1.80<H>    Ca    8.4<L>      22 Dec 2023 05:30  Phos  2.6     12-22  Mg     2.3     12-22    TPro  7.0  /  Alb  3.2<L>  /  TBili  1.3<H>  /  DBili  x   /  AST  19  /  ALT  27  /  AlkPhos  90  12-22          PT/INR - ( 22 Dec 2023 05:30 )   PT: 21.4 sec;   INR: 1.86 ratio         PTT - ( 22 Dec 2023 05:30 )  PTT:37.3 sec  Urinalysis Basic - ( 22 Dec 2023 05:30 )    Color: x / Appearance: x / SG: x / pH: x  Gluc: 113 mg/dL / Ketone: x  / Bili: x / Urobili: x   Blood: x / Protein: x / Nitrite: x   Leuk Esterase: x / RBC: x / WBC x   Sq Epi: x / Non Sq Epi: x / Bacteria: x      .Blood Blood-Peripheral   No growth at 24 hours -- 12-21 @ 01:00  .Blood Blood-Peripheral   No growth at 24 hours -- 12-21 @ 00:50      Rapid RVP Result: NotDetec (12-20 @ 22:50)          Radiology:  - TTE- LV systolic function is low-normal with a LVEF estimated at 50-55%, Mild mitral, aortic regurgitation, and trace tricupsid regurgiation , Small pericardial effusion noted adjacent to the posterior left ventricle    Bedside Ultrasound: none    Tubes/Lines: None      GLOBAL ISSUE/BEST PRACTICE:  Analgesia: N  Sedation: N  HOB elevation: Y  Stress ulcer prophylaxis: N  VTE prophylaxis: Xarelto  Glycemic control: N  Nutrition: Regular diet    CODE STATUS: FULL CODE     Interval Events: Patient had an episode of tachycardia () due to atrial fib at 0600, received a dose of metoprolol 5 mg, HR has improved and now 103. Patient seen and examined at beside this morning. Pt feels better, denies any complaints at this time.     Review of Systems:  Constitutional: No fever, chills, fatigue  Neuro: No headache, numbness, weakness  Resp: No cough, wheezing, shortness of breath  CVS: No chest pain, palpitations, leg swelling  GI: No abdominal pain, nausea, vomiting, diarrhea     ICU Vital Signs Last 24 Hrs  T(C): 37 (22 Dec 2023 12:00), Max: 37 (21 Dec 2023 15:30)  T(F): 98.6 (22 Dec 2023 12:00), Max: 98.6 (21 Dec 2023 15:30)  HR: 102 (22 Dec 2023 12:00) (59 - 128)  BP: 128/71 (22 Dec 2023 12:00) (102/60 - 163/76)  BP(mean): 94 (22 Dec 2023 12:00) (75 - 114)  ABP: --  ABP(mean): --  RR: 20 (22 Dec 2023 12:00) (18 - 24)  SpO2: 97% (22 Dec 2023 12:00) (89% - 98%)    O2 Parameters below as of 22 Dec 2023 12:00  Patient On (Oxygen Delivery Method): nasal cannula w/ humidification  O2 Flow (L/min): 2            12-21-23 @ 07:01  -  12-22-23 @ 07:00  --------------------------------------------------------  IN: 7.5 mL / OUT: 1900 mL / NET: -1892.5 mL    12-22-23 @ 07:01  -  12-22-23 @ 13:33  --------------------------------------------------------  IN: 0 mL / OUT: 600 mL / NET: -600 mL        CAPILLARY BLOOD GLUCOSE          I&O's Summary    21 Dec 2023 07:01  -  22 Dec 2023 07:00  --------------------------------------------------------  IN: 7.5 mL / OUT: 1900 mL / NET: -1892.5 mL    22 Dec 2023 07:01  -  22 Dec 2023 13:33  --------------------------------------------------------  IN: 0 mL / OUT: 600 mL / NET: -600 mL        Physical Exam:   Gen: resting comfortably seated up in bed  Neuro: answers questions and follows commands appropriately  HEENT: NCAT, sclera clear  Resp: CTA b/l  CVS: irregularly irregular rhythm, no murmurs   Abd: soft, non-tender, non-distended  Ext: WWP, no LE edema    Meds:    amLODIPine   Tablet Oral  atenolol  Tablet Oral  furosemide   Injectable IV Push  hydrALAZINE Oral    atorvastatin Oral          aspirin enteric coated Oral  rivaroxaban Oral        folic acid Oral    influenza  Vaccine (HIGH DOSE) IntraMuscular                                13.8   12.48 )-----------( 241      ( 22 Dec 2023 05:30 )             43.1       12-22    140  |  109<H>  |  33<H>  ----------------------------<  113<H>  3.9   |  24  |  1.80<H>    Ca    8.4<L>      22 Dec 2023 05:30  Phos  2.6     12-22  Mg     2.3     12-22    TPro  7.0  /  Alb  3.2<L>  /  TBili  1.3<H>  /  DBili  x   /  AST  19  /  ALT  27  /  AlkPhos  90  12-22          PT/INR - ( 22 Dec 2023 05:30 )   PT: 21.4 sec;   INR: 1.86 ratio         PTT - ( 22 Dec 2023 05:30 )  PTT:37.3 sec  Urinalysis Basic - ( 22 Dec 2023 05:30 )    Color: x / Appearance: x / SG: x / pH: x  Gluc: 113 mg/dL / Ketone: x  / Bili: x / Urobili: x   Blood: x / Protein: x / Nitrite: x   Leuk Esterase: x / RBC: x / WBC x   Sq Epi: x / Non Sq Epi: x / Bacteria: x      .Blood Blood-Peripheral   No growth at 24 hours -- 12-21 @ 01:00  .Blood Blood-Peripheral   No growth at 24 hours -- 12-21 @ 00:50      Rapid RVP Result: NotDetec (12-20 @ 22:50)          Radiology:  - TTE- LV systolic function is low-normal with a LVEF estimated at 50-55%, Mild mitral, aortic regurgitation, and trace tricuspid regurgitation , Small pericardial effusion noted adjacent to the posterior left ventricle    Bedside Ultrasound: none    Tubes/Lines: None      GLOBAL ISSUE/BEST PRACTICE:  Analgesia: N  Sedation: N  HOB elevation: Y  Stress ulcer prophylaxis: N  VTE prophylaxis: Xarelto  Glycemic control: N  Nutrition: Regular diet    CODE STATUS: FULL CODE     Interval Events: Patient had an episode of tachycardia () due to atrial fib at 0600, received a dose of metoprolol 5 mg, HR has improved and now 103. Patient seen and examined at beside this morning. Pt feels better, denies any complaints at this time.     Review of Systems:  Constitutional: No fever, chills, fatigue  Neuro: No headache, numbness, weakness  Resp: No cough, wheezing, shortness of breath  CVS: No chest pain, palpitations, leg swelling  GI: No abdominal pain, nausea, vomiting, diarrhea     ICU Vital Signs Last 24 Hrs  T(C): 37 (22 Dec 2023 12:00), Max: 37 (21 Dec 2023 15:30)  T(F): 98.6 (22 Dec 2023 12:00), Max: 98.6 (21 Dec 2023 15:30)  HR: 102 (22 Dec 2023 12:00) (59 - 128)  BP: 128/71 (22 Dec 2023 12:00) (102/60 - 163/76)  BP(mean): 94 (22 Dec 2023 12:00) (75 - 114)  ABP: --  ABP(mean): --  RR: 20 (22 Dec 2023 12:00) (18 - 24)  SpO2: 97% (22 Dec 2023 12:00) (89% - 98%)    O2 Parameters below as of 22 Dec 2023 12:00  Patient On (Oxygen Delivery Method): nasal cannula w/ humidification  O2 Flow (L/min): 2            12-21-23 @ 07:01  -  12-22-23 @ 07:00  --------------------------------------------------------  IN: 7.5 mL / OUT: 1900 mL / NET: -1892.5 mL    12-22-23 @ 07:01  -  12-22-23 @ 13:33  --------------------------------------------------------  IN: 0 mL / OUT: 600 mL / NET: -600 mL        CAPILLARY BLOOD GLUCOSE          I&O's Summary    21 Dec 2023 07:01  -  22 Dec 2023 07:00  --------------------------------------------------------  IN: 7.5 mL / OUT: 1900 mL / NET: -1892.5 mL    22 Dec 2023 07:01  -  22 Dec 2023 13:33  --------------------------------------------------------  IN: 0 mL / OUT: 600 mL / NET: -600 mL        Physical Exam:   Gen: resting comfortably seated up in bed  Neuro: answers questions and follows commands appropriately  HEENT: NCAT, sclera clear  Resp: CTA b/l  CVS: irregularly irregular rhythm, no murmurs   Abd: soft, non-tender, non-distended  Ext: WWP, no LE edema    Meds:    amLODIPine   Tablet Oral  atenolol  Tablet Oral  furosemide   Injectable IV Push  hydrALAZINE Oral    atorvastatin Oral          aspirin enteric coated Oral  rivaroxaban Oral        folic acid Oral    influenza  Vaccine (HIGH DOSE) IntraMuscular                                13.8   12.48 )-----------( 241      ( 22 Dec 2023 05:30 )             43.1       12-22    140  |  109<H>  |  33<H>  ----------------------------<  113<H>  3.9   |  24  |  1.80<H>    Ca    8.4<L>      22 Dec 2023 05:30  Phos  2.6     12-22  Mg     2.3     12-22    TPro  7.0  /  Alb  3.2<L>  /  TBili  1.3<H>  /  DBili  x   /  AST  19  /  ALT  27  /  AlkPhos  90  12-22          PT/INR - ( 22 Dec 2023 05:30 )   PT: 21.4 sec;   INR: 1.86 ratio         PTT - ( 22 Dec 2023 05:30 )  PTT:37.3 sec  Urinalysis Basic - ( 22 Dec 2023 05:30 )    Color: x / Appearance: x / SG: x / pH: x  Gluc: 113 mg/dL / Ketone: x  / Bili: x / Urobili: x   Blood: x / Protein: x / Nitrite: x   Leuk Esterase: x / RBC: x / WBC x   Sq Epi: x / Non Sq Epi: x / Bacteria: x      .Blood Blood-Peripheral   No growth at 24 hours -- 12-21 @ 01:00  .Blood Blood-Peripheral   No growth at 24 hours -- 12-21 @ 00:50      Rapid RVP Result: NotDetec (12-20 @ 22:50)          Radiology:  - TTE- LV systolic function is low-normal with a LVEF estimated at 50-55%, Mild mitral, aortic regurgitation, and trace tricuspid regurgitation , Small pericardial effusion noted adjacent to the posterior left ventricle    Tubes/Lines: None      GLOBAL ISSUE/BEST PRACTICE:  Analgesia: N  Sedation: N  HOB elevation: Y  Stress ulcer prophylaxis: N  VTE prophylaxis: Xarelto  Glycemic control: N  Nutrition: Regular diet    CODE STATUS: FULL CODE

## 2023-12-23 ENCOUNTER — TRANSCRIPTION ENCOUNTER (OUTPATIENT)
Age: 73
End: 2023-12-23

## 2023-12-23 DIAGNOSIS — Z29.9 ENCOUNTER FOR PROPHYLACTIC MEASURES, UNSPECIFIED: ICD-10-CM

## 2023-12-23 LAB
ALBUMIN SERPL ELPH-MCNC: 3.1 G/DL — LOW (ref 3.3–5)
ALBUMIN SERPL ELPH-MCNC: 3.1 G/DL — LOW (ref 3.3–5)
ALP SERPL-CCNC: 97 U/L — SIGNIFICANT CHANGE UP (ref 40–120)
ALP SERPL-CCNC: 97 U/L — SIGNIFICANT CHANGE UP (ref 40–120)
ALT FLD-CCNC: 34 U/L — SIGNIFICANT CHANGE UP (ref 12–78)
ALT FLD-CCNC: 34 U/L — SIGNIFICANT CHANGE UP (ref 12–78)
ANION GAP SERPL CALC-SCNC: 7 MMOL/L — SIGNIFICANT CHANGE UP (ref 5–17)
ANION GAP SERPL CALC-SCNC: 7 MMOL/L — SIGNIFICANT CHANGE UP (ref 5–17)
AST SERPL-CCNC: 23 U/L — SIGNIFICANT CHANGE UP (ref 15–37)
AST SERPL-CCNC: 23 U/L — SIGNIFICANT CHANGE UP (ref 15–37)
BASOPHILS # BLD AUTO: 0.08 K/UL — SIGNIFICANT CHANGE UP (ref 0–0.2)
BASOPHILS # BLD AUTO: 0.08 K/UL — SIGNIFICANT CHANGE UP (ref 0–0.2)
BASOPHILS NFR BLD AUTO: 0.8 % — SIGNIFICANT CHANGE UP (ref 0–2)
BASOPHILS NFR BLD AUTO: 0.8 % — SIGNIFICANT CHANGE UP (ref 0–2)
BILIRUB SERPL-MCNC: 0.8 MG/DL — SIGNIFICANT CHANGE UP (ref 0.2–1.2)
BILIRUB SERPL-MCNC: 0.8 MG/DL — SIGNIFICANT CHANGE UP (ref 0.2–1.2)
BUN SERPL-MCNC: 36 MG/DL — HIGH (ref 7–23)
BUN SERPL-MCNC: 36 MG/DL — HIGH (ref 7–23)
CALCIUM SERPL-MCNC: 8.8 MG/DL — SIGNIFICANT CHANGE UP (ref 8.5–10.1)
CALCIUM SERPL-MCNC: 8.8 MG/DL — SIGNIFICANT CHANGE UP (ref 8.5–10.1)
CHLORIDE SERPL-SCNC: 108 MMOL/L — SIGNIFICANT CHANGE UP (ref 96–108)
CHLORIDE SERPL-SCNC: 108 MMOL/L — SIGNIFICANT CHANGE UP (ref 96–108)
CO2 SERPL-SCNC: 25 MMOL/L — SIGNIFICANT CHANGE UP (ref 22–31)
CO2 SERPL-SCNC: 25 MMOL/L — SIGNIFICANT CHANGE UP (ref 22–31)
CREAT SERPL-MCNC: 1.8 MG/DL — HIGH (ref 0.5–1.3)
CREAT SERPL-MCNC: 1.8 MG/DL — HIGH (ref 0.5–1.3)
EGFR: 40 ML/MIN/1.73M2 — LOW
EGFR: 40 ML/MIN/1.73M2 — LOW
EOSINOPHIL # BLD AUTO: 0.39 K/UL — SIGNIFICANT CHANGE UP (ref 0–0.5)
EOSINOPHIL # BLD AUTO: 0.39 K/UL — SIGNIFICANT CHANGE UP (ref 0–0.5)
EOSINOPHIL NFR BLD AUTO: 3.9 % — SIGNIFICANT CHANGE UP (ref 0–6)
EOSINOPHIL NFR BLD AUTO: 3.9 % — SIGNIFICANT CHANGE UP (ref 0–6)
GLUCOSE SERPL-MCNC: 97 MG/DL — SIGNIFICANT CHANGE UP (ref 70–99)
GLUCOSE SERPL-MCNC: 97 MG/DL — SIGNIFICANT CHANGE UP (ref 70–99)
HCT VFR BLD CALC: 42 % — SIGNIFICANT CHANGE UP (ref 39–50)
HCT VFR BLD CALC: 42 % — SIGNIFICANT CHANGE UP (ref 39–50)
HGB BLD-MCNC: 13.6 G/DL — SIGNIFICANT CHANGE UP (ref 13–17)
HGB BLD-MCNC: 13.6 G/DL — SIGNIFICANT CHANGE UP (ref 13–17)
IMM GRANULOCYTES NFR BLD AUTO: 0.2 % — SIGNIFICANT CHANGE UP (ref 0–0.9)
IMM GRANULOCYTES NFR BLD AUTO: 0.2 % — SIGNIFICANT CHANGE UP (ref 0–0.9)
LYMPHOCYTES # BLD AUTO: 2.06 K/UL — SIGNIFICANT CHANGE UP (ref 1–3.3)
LYMPHOCYTES # BLD AUTO: 2.06 K/UL — SIGNIFICANT CHANGE UP (ref 1–3.3)
LYMPHOCYTES # BLD AUTO: 20.8 % — SIGNIFICANT CHANGE UP (ref 13–44)
LYMPHOCYTES # BLD AUTO: 20.8 % — SIGNIFICANT CHANGE UP (ref 13–44)
MAGNESIUM SERPL-MCNC: 2.3 MG/DL — SIGNIFICANT CHANGE UP (ref 1.6–2.6)
MAGNESIUM SERPL-MCNC: 2.3 MG/DL — SIGNIFICANT CHANGE UP (ref 1.6–2.6)
MCHC RBC-ENTMCNC: 23.5 PG — LOW (ref 27–34)
MCHC RBC-ENTMCNC: 23.5 PG — LOW (ref 27–34)
MCHC RBC-ENTMCNC: 32.4 GM/DL — SIGNIFICANT CHANGE UP (ref 32–36)
MCHC RBC-ENTMCNC: 32.4 GM/DL — SIGNIFICANT CHANGE UP (ref 32–36)
MCV RBC AUTO: 72.5 FL — LOW (ref 80–100)
MCV RBC AUTO: 72.5 FL — LOW (ref 80–100)
MONOCYTES # BLD AUTO: 1.07 K/UL — HIGH (ref 0–0.9)
MONOCYTES # BLD AUTO: 1.07 K/UL — HIGH (ref 0–0.9)
MONOCYTES NFR BLD AUTO: 10.8 % — SIGNIFICANT CHANGE UP (ref 2–14)
MONOCYTES NFR BLD AUTO: 10.8 % — SIGNIFICANT CHANGE UP (ref 2–14)
NEUTROPHILS # BLD AUTO: 6.27 K/UL — SIGNIFICANT CHANGE UP (ref 1.8–7.4)
NEUTROPHILS # BLD AUTO: 6.27 K/UL — SIGNIFICANT CHANGE UP (ref 1.8–7.4)
NEUTROPHILS NFR BLD AUTO: 63.5 % — SIGNIFICANT CHANGE UP (ref 43–77)
NEUTROPHILS NFR BLD AUTO: 63.5 % — SIGNIFICANT CHANGE UP (ref 43–77)
NRBC # BLD: 0 /100 WBCS — SIGNIFICANT CHANGE UP (ref 0–0)
NRBC # BLD: 0 /100 WBCS — SIGNIFICANT CHANGE UP (ref 0–0)
PHOSPHATE SERPL-MCNC: 2.3 MG/DL — LOW (ref 2.5–4.5)
PHOSPHATE SERPL-MCNC: 2.3 MG/DL — LOW (ref 2.5–4.5)
PLATELET # BLD AUTO: 234 K/UL — SIGNIFICANT CHANGE UP (ref 150–400)
PLATELET # BLD AUTO: 234 K/UL — SIGNIFICANT CHANGE UP (ref 150–400)
POTASSIUM SERPL-MCNC: 3.6 MMOL/L — SIGNIFICANT CHANGE UP (ref 3.5–5.3)
POTASSIUM SERPL-MCNC: 3.6 MMOL/L — SIGNIFICANT CHANGE UP (ref 3.5–5.3)
POTASSIUM SERPL-SCNC: 3.6 MMOL/L — SIGNIFICANT CHANGE UP (ref 3.5–5.3)
POTASSIUM SERPL-SCNC: 3.6 MMOL/L — SIGNIFICANT CHANGE UP (ref 3.5–5.3)
PROT SERPL-MCNC: 7.1 G/DL — SIGNIFICANT CHANGE UP (ref 6–8.3)
PROT SERPL-MCNC: 7.1 G/DL — SIGNIFICANT CHANGE UP (ref 6–8.3)
RBC # BLD: 5.79 M/UL — SIGNIFICANT CHANGE UP (ref 4.2–5.8)
RBC # BLD: 5.79 M/UL — SIGNIFICANT CHANGE UP (ref 4.2–5.8)
RBC # FLD: 16 % — HIGH (ref 10.3–14.5)
RBC # FLD: 16 % — HIGH (ref 10.3–14.5)
SODIUM SERPL-SCNC: 140 MMOL/L — SIGNIFICANT CHANGE UP (ref 135–145)
SODIUM SERPL-SCNC: 140 MMOL/L — SIGNIFICANT CHANGE UP (ref 135–145)
WBC # BLD: 9.89 K/UL — SIGNIFICANT CHANGE UP (ref 3.8–10.5)
WBC # BLD: 9.89 K/UL — SIGNIFICANT CHANGE UP (ref 3.8–10.5)
WBC # FLD AUTO: 9.89 K/UL — SIGNIFICANT CHANGE UP (ref 3.8–10.5)
WBC # FLD AUTO: 9.89 K/UL — SIGNIFICANT CHANGE UP (ref 3.8–10.5)

## 2023-12-23 PROCEDURE — 99232 SBSQ HOSP IP/OBS MODERATE 35: CPT

## 2023-12-23 PROCEDURE — 99231 SBSQ HOSP IP/OBS SF/LOW 25: CPT

## 2023-12-23 RX ORDER — FUROSEMIDE 40 MG
1 TABLET ORAL
Qty: 30 | Refills: 0
Start: 2023-12-23 | End: 2024-01-21

## 2023-12-23 RX ORDER — FOLIC ACID 0.8 MG
1 TABLET ORAL
Qty: 0 | Refills: 0 | DISCHARGE
Start: 2023-12-23

## 2023-12-23 RX ORDER — AMLODIPINE BESYLATE 2.5 MG/1
1 TABLET ORAL
Qty: 30 | Refills: 0
Start: 2023-12-23 | End: 2024-01-21

## 2023-12-23 RX ORDER — FUROSEMIDE 40 MG
40 TABLET ORAL DAILY
Refills: 0 | Status: DISCONTINUED | OUTPATIENT
Start: 2023-12-23 | End: 2023-12-24

## 2023-12-23 RX ORDER — TAMSULOSIN HYDROCHLORIDE 0.4 MG/1
0.4 CAPSULE ORAL AT BEDTIME
Refills: 0 | Status: DISCONTINUED | OUTPATIENT
Start: 2023-12-23 | End: 2023-12-24

## 2023-12-23 RX ADMIN — Medication 81 MILLIGRAM(S): at 11:30

## 2023-12-23 RX ADMIN — RIVAROXABAN 15 MILLIGRAM(S): KIT at 17:38

## 2023-12-23 RX ADMIN — MUPIROCIN 1 APPLICATION(S): 20 OINTMENT TOPICAL at 06:54

## 2023-12-23 RX ADMIN — Medication 1 MILLIGRAM(S): at 11:30

## 2023-12-23 RX ADMIN — AMLODIPINE BESYLATE 10 MILLIGRAM(S): 2.5 TABLET ORAL at 06:54

## 2023-12-23 RX ADMIN — MUPIROCIN 1 APPLICATION(S): 20 OINTMENT TOPICAL at 17:39

## 2023-12-23 RX ADMIN — ATENOLOL 50 MILLIGRAM(S): 25 TABLET ORAL at 06:48

## 2023-12-23 RX ADMIN — Medication 80 MILLIGRAM(S): at 06:47

## 2023-12-23 RX ADMIN — ATORVASTATIN CALCIUM 40 MILLIGRAM(S): 80 TABLET, FILM COATED ORAL at 21:38

## 2023-12-23 RX ADMIN — Medication 25 MILLIGRAM(S): at 21:38

## 2023-12-23 RX ADMIN — ATENOLOL 50 MILLIGRAM(S): 25 TABLET ORAL at 17:38

## 2023-12-23 RX ADMIN — TAMSULOSIN HYDROCHLORIDE 0.4 MILLIGRAM(S): 0.4 CAPSULE ORAL at 21:38

## 2023-12-23 RX ADMIN — Medication 25 MILLIGRAM(S): at 06:46

## 2023-12-23 RX ADMIN — Medication 25 MILLIGRAM(S): at 13:54

## 2023-12-23 NOTE — PROGRESS NOTE ADULT - SUBJECTIVE AND OBJECTIVE BOX
Date of Service 12-23-23 @ 15:55    Patient is a 73y old  Male who presents with a chief complaint of resp fail (23 Dec 2023 12:33)      INTERVAL /OVERNIGHT EVENTS: feels better, but sats borderline    MEDICATIONS  (STANDING):  amLODIPine   Tablet 10 milliGRAM(s) Oral daily  aspirin enteric coated 81 milliGRAM(s) Oral daily  atenolol  Tablet 50 milliGRAM(s) Oral two times a day  atorvastatin 40 milliGRAM(s) Oral at bedtime  folic acid 1 milliGRAM(s) Oral daily  furosemide    Tablet 40 milliGRAM(s) Oral daily  hydrALAZINE 25 milliGRAM(s) Oral three times a day  influenza  Vaccine (HIGH DOSE) 0.7 milliLiter(s) IntraMuscular once  mupirocin 2% Nasal 1 Application(s) Both Nostrils two times a day  rivaroxaban 15 milliGRAM(s) Oral with dinner  tamsulosin 0.4 milliGRAM(s) Oral at bedtime    MEDICATIONS  (PRN):      Allergies    No Known Allergies    Intolerances        REVIEW OF SYSTEMS:  CONSTITUTIONAL: No fever, weight loss, or fatigue  EYES: No eye pain, visual disturbances, or discharge  ENMT:  No difficulty hearing, tinnitus, vertigo; No sinus or throat pain  NECK: No pain or stiffness  RESPIRATORY: No cough, wheezing, chills or hemoptysis; No shortness of breath  CARDIOVASCULAR: No chest pain, palpitations, dizziness, or leg swelling  GASTROINTESTINAL: No abdominal or epigastric pain. No nausea, vomiting, or hematemesis; No diarrhea or constipation. No melena or hematochezia.  GENITOURINARY: No dysuria, frequency, hematuria, or incontinence  NEUROLOGICAL: No headaches, memory loss, loss of strength, numbness, or tremors  SKIN: No itching, burning, rashes, or lesions   LYMPH NODES: No enlarged glands  ENDOCRINE: No heat or cold intolerance; No hair loss; No polydipsia or polyuria  MUSCULOSKELETAL: No joint pain or swelling; No muscle, back, or extremity pain  PSYCHIATRIC: No depression, anxiety, mood swings, or difficulty sleeping  HEME/LYMPH: No easy bruising, or bleeding gums  ALLERGY AND IMMUNOLOGIC: No hives or eczema    Vital Signs Last 24 Hrs  T(C): 36.3 (23 Dec 2023 11:34), Max: 36.6 (23 Dec 2023 04:39)  T(F): 97.3 (23 Dec 2023 11:34), Max: 97.8 (23 Dec 2023 04:39)  HR: 73 (23 Dec 2023 11:34) (73 - 103)  BP: 113/72 (23 Dec 2023 11:34) (113/72 - 127/83)  BP(mean): --  RR: 18 (23 Dec 2023 11:34) (18 - 18)  SpO2: 93% (23 Dec 2023 13:05) (91% - 98%)    Parameters below as of 23 Dec 2023 13:05  Patient On (Oxygen Delivery Method): room air, while pt is talking        PHYSICAL EXAM:  GENERAL: NAD, well-groomed, well-developed  HEAD:  Atraumatic, Normocephalic  EYES: EOMI, PERRLA, conjunctiva and sclera clear  ENMT: No tonsillar erythema, exudates, or enlargement; Moist mucous membranes, Good dentition, No lesions  NECK: Supple, No JVD, Normal thyroid  NERVOUS SYSTEM:  Alert & Oriented X3, Good concentration; Motor Strength 5/5 B/L upper and lower extremities; DTRs 2+ intact and symmetric  CHEST/LUNG: Clear to auscultation bilaterally; No rales, rhonchi, wheezing, or rubs  HEART: Regular rate and rhythm; No murmurs, rubs, or gallops  ABDOMEN: Soft, Nontender, Nondistended; Bowel sounds present  EXTREMITIES:  2+ Peripheral Pulses, No clubbing, cyanosis, or edema  LYMPH: No lymphadenopathy noted  SKIN: No rashes or lesions    LABS:                        13.6   9.89  )-----------( 234      ( 23 Dec 2023 06:35 )             42.0     23 Dec 2023 06:35    140    |  108    |  36     ----------------------------<  97     3.6     |  25     |  1.80     Ca    8.8        23 Dec 2023 06:35  Phos  2.3       23 Dec 2023 06:35  Mg     2.3       23 Dec 2023 06:35    TPro  7.1    /  Alb  3.1    /  TBili  0.8    /  DBili  x      /  AST  23     /  ALT  34     /  AlkPhos  97     23 Dec 2023 06:35    PT/INR - ( 22 Dec 2023 05:30 )   PT: 21.4 sec;   INR: 1.86 ratio         PTT - ( 22 Dec 2023 05:30 )  PTT:37.3 sec  Urinalysis Basic - ( 23 Dec 2023 06:35 )    Color: x / Appearance: x / SG: x / pH: x  Gluc: 97 mg/dL / Ketone: x  / Bili: x / Urobili: x   Blood: x / Protein: x / Nitrite: x   Leuk Esterase: x / RBC: x / WBC x   Sq Epi: x / Non Sq Epi: x / Bacteria: x      CAPILLARY BLOOD GLUCOSE          RADIOLOGY & ADDITIONAL TESTS:    Notes Reviewed:  [x ] YES  [ ] NO    Care Discussed with Consultants/Other Providers [x ] YES  [ ] NO

## 2023-12-23 NOTE — DISCHARGE NOTE PROVIDER - NSDCCPCAREPLAN_GEN_ALL_CORE_FT
PRINCIPAL DISCHARGE DIAGNOSIS  Diagnosis: Acute respiratory failure with hypoxia  Assessment and Plan of Treatment: follow up with PMD / lung doctor      SECONDARY DISCHARGE DIAGNOSES  Diagnosis: LEOBARDO (acute kidney injury)  Assessment and Plan of Treatment:     Diagnosis: Acute respiratory failure with hypoxia  Assessment and Plan of Treatment:

## 2023-12-23 NOTE — DISCHARGE NOTE PROVIDER - CARE PROVIDER_API CALL
MAGGI SCHNEIDER, JOHNY BECKETT  Phone: [phone]  Fax: [phone]  Follow Up Time:     Tonio Lizarraga  Pulmonary Disease  [address] [place] [id number]  Phone: [phone]  Fax: [phone]  Follow Up Time:     Ezekiel Gillespie  Cardiology  [address] [place] [address]  Phone: [phone]  Fax: [phone]  Follow Up Time:    MAGGI SCHNEIDER, JOHNY BECKETT  Phone: [phone]  Fax: [phone]  Follow Up Time:     Tonio Lizarraga  Pulmonary Disease  [address] [place] [address]  Phone: [phone]  Fax: [phone]  Follow Up Time:     Ezekiel Gillespie  Cardiology  [address] [place] [address]  Phone: [phone]  Fax: [phone]  Follow Up Time:

## 2023-12-23 NOTE — PROGRESS NOTE ADULT - ASSESSMENT
LEOBARDO on CKD 3  Metabolic Acidosis  HTN Urgency  Dyspnea  CHF    -Baseline Creatinine 1.5  -LEOBARDO likely 2/2 to hypoxic injury; now plateau   -Will need to tolerate higher creatinine  to cont lasix to achieve euvolemia  -No emergent indication for RRT  -No indication for bicarb at this time and will avoid salt load  -Diuretic will also help improve BP  -Creatinine improving

## 2023-12-23 NOTE — PROGRESS NOTE ADULT - SUBJECTIVE AND OBJECTIVE BOX
Patient is a 73y old  Male who presents with a chief complaint of resp fail (21 Dec 2023 14:01)       HPI: 72 Y/O CAD with stents, afib, hypertension, hyperlipidemia here complaining of heavy breathing. patient reports yesterday he had a mild cough then about 45 minutes prior to arrival patient developed difficulty breathing.  Has not seen nephrologist in the past.  States he is urinating.  NO N/V.  SOB improving.       PAST MEDICAL & SURGICAL HISTORY:  Essential hypertension      Gastroesophageal reflux disease, esophagitis presence not specified      HTN (hypertension)      Hyperlipidemia      CAD (coronary artery disease)  stents x 2      CAD S/P percutaneous coronary angioplasty  with ANAHI           FAMILY HISTORY:  FH: heart disease  father and mother     [place]    Social History:Non smoker    MEDICATIONS  (STANDING):  amLODIPine   Tablet 10 milliGRAM(s) Oral daily  aspirin enteric coated 81 milliGRAM(s) Oral daily  atenolol  Tablet 50 milliGRAM(s) Oral daily  atorvastatin 20 milliGRAM(s) Oral at bedtime  folic acid 1 milliGRAM(s) Oral daily  furosemide   Injectable 80 milliGRAM(s) IV Push daily  hydrALAZINE 25 milliGRAM(s) Oral three times a day  influenza  Vaccine (HIGH DOSE) 0.7 milliLiter(s) IntraMuscular once  nitroglycerin  Infusion 5 MICROgram(s)/Min (1.5 mL/Hr) IV Continuous <Continuous>  rivaroxaban 15 milliGRAM(s) Oral with dinner    MEDICATIONS  (PRN):   Meds reviewed    Allergies    No Known Allergies    Intolerances         REVIEW OF SYSTEMS:    as above    Vital Signs Last 24 Hrs  T(C): 36.6 (23 Dec 2023 04:39), Max: 37 (22 Dec 2023 12:00)  T(F): 97.8 (23 Dec 2023 04:39), Max: 98.6 (22 Dec 2023 12:00)  HR: 73 (23 Dec 2023 04:39) (73 - 109)  BP: 127/83 (23 Dec 2023 04:39) (102/62 - 128/71)  BP(mean): 94 (22 Dec 2023 12:00) (76 - 94)  RR: 18 (23 Dec 2023 04:39) (18 - 24)  SpO2: 95% (23 Dec 2023 04:39) (95% - 97%)    Parameters below as of 23 Dec 2023 04:39  Patient On (Oxygen Delivery Method): nasal cannula        PHYSICAL EXAM:    GENERAL: NAD  HEAD:  Atraumatic, Normocephalic  EYES: EOMI, conjunctiva and sclera clear  ENMT: No Drainage from nares, No drainage from ears  NERVOUS SYSTEM:  Awake and Alert  CHEST/LUNG: + rales  EXTREMITIES:  No Edema  SKIN: No rashes No obvious ecchymosis      LABS:                        13.8   12.48 )-----------( 241      ( 22 Dec 2023 05:30 )             43.1     12    140  |  109<H>  |  33<H>  ----------------------------<  113<H>  3.9   |  24  |  1.80<H>    Ca    8.4<L>      22 Dec 2023 05:30  Phos  2.6       Mg     2.3         TPro  7.0  /  Alb  3.2<L>  /  TBili  1.3<H>  /  DBili  x   /  AST  19  /  ALT  27  /  AlkPhos  90      PT/INR - ( 22 Dec 2023 05:30 )   PT: 21.4 sec;   INR: 1.86 ratio         PTT - ( 22 Dec 2023 05:30 )  PTT:37.3 sec  Urinalysis Basic - ( 22 Dec 2023 05:30 )    Color: x / Appearance: x / SG: x / pH: x  Gluc: 113 mg/dL / Ketone: x  / Bili: x / Urobili: x   Blood: x / Protein: x / Nitrite: x   Leuk Esterase: x / RBC: x / WBC x   Sq Epi: x / Non Sq Epi: x / Bacteria: x   Patient is a 73y old  Male who presents with a chief complaint of resp fail (21 Dec 2023 14:01)       HPI: 74 Y/O CAD with stents, afib, hypertension, hyperlipidemia here complaining of heavy breathing. patient reports yesterday he had a mild cough then about 45 minutes prior to arrival patient developed difficulty breathing.  Has not seen nephrologist in the past.  States he is urinating.  NO N/V.  SOB improving.       PAST MEDICAL & SURGICAL HISTORY:  Essential hypertension      Gastroesophageal reflux disease, esophagitis presence not specified      HTN (hypertension)      Hyperlipidemia      CAD (coronary artery disease)  stents x 2      CAD S/P percutaneous coronary angioplasty  with ANAHI           FAMILY HISTORY:  FH: heart disease  father and mother     [place]    Social History:Non smoker    MEDICATIONS  (STANDING):  amLODIPine   Tablet 10 milliGRAM(s) Oral daily  aspirin enteric coated 81 milliGRAM(s) Oral daily  atenolol  Tablet 50 milliGRAM(s) Oral daily  atorvastatin 20 milliGRAM(s) Oral at bedtime  folic acid 1 milliGRAM(s) Oral daily  furosemide   Injectable 80 milliGRAM(s) IV Push daily  hydrALAZINE 25 milliGRAM(s) Oral three times a day  influenza  Vaccine (HIGH DOSE) 0.7 milliLiter(s) IntraMuscular once  nitroglycerin  Infusion 5 MICROgram(s)/Min (1.5 mL/Hr) IV Continuous <Continuous>  rivaroxaban 15 milliGRAM(s) Oral with dinner    MEDICATIONS  (PRN):   Meds reviewed    Allergies    No Known Allergies    Intolerances         REVIEW OF SYSTEMS:    as above    Vital Signs Last 24 Hrs  T(C): 36.6 (23 Dec 2023 04:39), Max: 37 (22 Dec 2023 12:00)  T(F): 97.8 (23 Dec 2023 04:39), Max: 98.6 (22 Dec 2023 12:00)  HR: 73 (23 Dec 2023 04:39) (73 - 109)  BP: 127/83 (23 Dec 2023 04:39) (102/62 - 128/71)  BP(mean): 94 (22 Dec 2023 12:00) (76 - 94)  RR: 18 (23 Dec 2023 04:39) (18 - 24)  SpO2: 95% (23 Dec 2023 04:39) (95% - 97%)    Parameters below as of 23 Dec 2023 04:39  Patient On (Oxygen Delivery Method): nasal cannula        PHYSICAL EXAM:    GENERAL: NAD  HEAD:  Atraumatic, Normocephalic  EYES: EOMI, conjunctiva and sclera clear  ENMT: No Drainage from nares, No drainage from ears  NERVOUS SYSTEM:  Awake and Alert  CHEST/LUNG: + rales  EXTREMITIES:  No Edema  SKIN: No rashes No obvious ecchymosis      LABS:                        13.8   12.48 )-----------( 241      ( 22 Dec 2023 05:30 )             43.1     12    140  |  109<H>  |  33<H>  ----------------------------<  113<H>  3.9   |  24  |  1.80<H>    Ca    8.4<L>      22 Dec 2023 05:30  Phos  2.6       Mg     2.3         TPro  7.0  /  Alb  3.2<L>  /  TBili  1.3<H>  /  DBili  x   /  AST  19  /  ALT  27  /  AlkPhos  90      PT/INR - ( 22 Dec 2023 05:30 )   PT: 21.4 sec;   INR: 1.86 ratio         PTT - ( 22 Dec 2023 05:30 )  PTT:37.3 sec  Urinalysis Basic - ( 22 Dec 2023 05:30 )    Color: x / Appearance: x / SG: x / pH: x  Gluc: 113 mg/dL / Ketone: x  / Bili: x / Urobili: x   Blood: x / Protein: x / Nitrite: x   Leuk Esterase: x / RBC: x / WBC x   Sq Epi: x / Non Sq Epi: x / Bacteria: x

## 2023-12-23 NOTE — DISCHARGE NOTE PROVIDER - HOSPITAL COURSE
admitted for acute respiratory failure  bipap per intensivist  found to have CHF  lasix for diuresis  doubt PNA  DC after cardio and pulm clearance admitted for acute respiratory failure  bipap per intensivist  found to have CHF  lasix for diuresis  doubt PNA  DC'd after cardio and pulm clearance    >35 minutes spent on discharge

## 2023-12-23 NOTE — PROGRESS NOTE ADULT - SUBJECTIVE AND OBJECTIVE BOX
[facility] -- Roxi Pardo Pannella, Patel, Savella, Goodger, Cohen  Office # [phone]    Follow Up:  AF    Subjective/Observations: Patient seen and examined, awake, alert, resting comfortably in bed, denies chest pain, dyspnea, palpitations or dizziness, orthopnea and PND. Tolerating room air.no events overnight     REVIEW OF SYSTEMS: All other review of systems is negative unless indicated above  PAST MEDICAL & SURGICAL HISTORY:  Essential hypertension      Gastroesophageal reflux disease, esophagitis presence not specified      HTN (hypertension)      Hyperlipidemia      CAD (coronary artery disease)  stents x 2      CAD S/P percutaneous coronary angioplasty  with ANAHI        MEDICATIONS  (STANDING):  amLODIPine   Tablet 10 milliGRAM(s) Oral daily  aspirin enteric coated 81 milliGRAM(s) Oral daily  atenolol  Tablet 50 milliGRAM(s) Oral two times a day  atorvastatin 40 milliGRAM(s) Oral at bedtime  folic acid 1 milliGRAM(s) Oral daily  furosemide    Tablet 40 milliGRAM(s) Oral daily  hydrALAZINE 25 milliGRAM(s) Oral three times a day  influenza  Vaccine (HIGH DOSE) 0.7 milliLiter(s) IntraMuscular once  mupirocin 2% Nasal 1 Application(s) Both Nostrils two times a day  rivaroxaban 15 milliGRAM(s) Oral with dinner  tamsulosin 0.4 milliGRAM(s) Oral at bedtime    MEDICATIONS  (PRN):    Allergies    No Known Allergies    Intolerances      Vital Signs Last 24 Hrs  T(C): 36.3 (23 Dec 2023 11:34), Max: 36.6 (23 Dec 2023 04:39)  T(F): 97.3 (23 Dec 2023 11:34), Max: 97.8 (23 Dec 2023 04:39)  HR: 73 (23 Dec 2023 11:34) (73 - 103)  BP: 113/72 (23 Dec 2023 11:34) (113/72 - 127/83)  BP(mean): --  RR: 18 (23 Dec 2023 11:34) (18 - 18)  SpO2: 91% (23 Dec 2023 11:34) (91% - 95%)    Parameters below as of 23 Dec 2023 11:34  Patient On (Oxygen Delivery Method): room air      I&O's Summary    22 Dec 2023 07:01  -  23 Dec 2023 07:00  --------------------------------------------------------  IN: 0 mL / OUT: 600 mL / NET: -600 mL          TELE: AF 80's   PHYSICAL EXAM:  Constitutional: NAD, awake and alert  HEENT: Moist Mucous Membranes, Anicteric  Pulmonary: Non-labored, breath sounds are clear bilaterally, No wheezing, rales or rhonchi  Cardiovascular: IRRR, S1 and S2, No murmurs, rubs, gallops or clicks  Gastrointestinal: Bowel Sounds present, soft, nontender.   Lymph: No peripheral edema. No lymphadenopathy.  Skin: No visible rashes or ulcers.  Psych:  Mood & affect appropriate  LABS: All Labs Reviewed:                        13.6   9.89  )-----------( 234      ( 23 Dec 2023 06:35 )             42.0                         13.8   12.48 )-----------( 241      ( 22 Dec 2023 05:30 )             43.1                         14.3   17.69 )-----------( 257      ( 21 Dec 2023 09:45 )             44.0     23 Dec 2023 06:35    140    |  108    |  36     ----------------------------<  97     3.6     |  25     |  1.80   22 Dec 2023 05:30    140    |  109    |  33     ----------------------------<  113    3.9     |  24     |  1.80   21 Dec 2023 04:28    139    |  111    |  38     ----------------------------<  156    4.0     |  20     |  2.00     Ca    8.8        23 Dec 2023 06:35  Ca    8.4        22 Dec 2023 05:30  Ca    8.2        21 Dec 2023 04:28  Phos  2.3       23 Dec 2023 06:35  Phos  2.6       22 Dec 2023 05:30  Mg     2.3       23 Dec 2023 06:35  Mg     2.3       22 Dec 2023 05:30  Mg     1.9       20 Dec 2023 22:50    TPro  7.1    /  Alb  3.1    /  TBili  0.8    /  DBili  x      /  AST  23     /  ALT  34     /  AlkPhos  97     23 Dec 2023 06:35  TPro  7.0    /  Alb  3.2    /  TBili  1.3    /  DBili  x      /  AST  19     /  ALT  27     /  AlkPhos  90     22 Dec 2023 05:30  TPro  7.2    /  Alb  3.6    /  TBili  0.5    /  DBili  x      /  AST  18     /  ALT  33     /  AlkPhos  115    20 Dec 2023 22:50    PT/INR - ( 22 Dec 2023 05:30 )   PT: 21.4 sec;   INR: 1.86 ratio         PTT - ( 22 Dec 2023 05:30 )  PTT:37.3 sec      12 Lead ECG:   Ventricular Rate 71 BPM    Atrial Rate 71 BPM    P-R Interval 180 ms    QRS Duration 106 ms    Q-T Interval 432 ms    QTC Calculation(Bazett) 469 ms    P Axis 50 degrees    R Axis -31 degrees    T Axis 66 degrees    Diagnosis Line Normal sinus rhythm  Left axis deviation  Confirmed by KALYN TAN ([id number]) on 12/21/2023 1:13:05 PM (12-21-23 @ 03:17)      TRANSTHORACIC ECHOCARDIOGRAM REPORT  ________________________________________________________________________________                                      _______       Pt. Name:       TRUDI COOLEY Study Date:    12/21/2023  MRN:            [id number]      YOB: 1950  Accession #:    [id number]     Age:           73 years  Account#:       [id number]    Gender:        M  Heart Rate:                   Height:        64.17 in (163.00 cm)  Rhythm:                       Weight:        189.59 lb (86.00 kg)  Blood Pressure: 136/61 mmHg   BSA/BMI:       1.92 m² / 32.37 kg/m²  ________________________________________________________________________________________  Referring Physician: [id number] Claudy Real  Primary Sonographer: Yessica Rdz RDCS    CPT:               ECHO TTE WO CON COMP W DOPP - 84724.m  Indication(s):     Dyspnea, unspecified - R06.00  Procedure:         Transthoracic echocardiogram with 2-D, M-mode and complete                     spectral and color flow Doppler.  Ordering Location: [facility]  Study Information: Image quality for this study is technically difficult.    _______________________________________________________________________________________     CONCLUSIONS:      1. Technically difficult image quality.   2. Left ventricular systolic function is low-normal with a LVEF estimated at 50-55%.   3. Normal right ventricular cavity size, wall thickness, and systolic function.   4. Mild mitral regurgitation.   5. Mild aortic regurgitation.   6. Tracetricuspid regurgitation.   7. Small pericardial effusion noted adjacent to the posterior left ventricle associated with a large anterior fat pad, without diastolic RV collapse.   8. The inferior vena cava is normal in size measuring 1.33 cm in diameter, (normal <2.1cm) with normal inspiratory collapse (normal >50%) consistent with normal right atrial pressure (~3, range 0-5mmHg).    ________________________________________________________________________________________  FINDINGS:     Left Ventricle:  Left ventricular systolic function is low normal with a calculated ejection fraction of 50-55% by the Burns's biplane method of disks. The left ventricular diastolic function is indeterminate.     Right Ventricle:  The right ventricular cavityis normal in size, normal wall thickness and normal systolic function.     Left Atrium:  The left atrium is normal.     Right Atrium:  The right atrium is normal in size with an indexed volume of 23.64 ml/m².     Aortic Valve:  The aortic valve anatomy cannot be determined with normal systolic excursion. There is moderate calcification of the aortic valve leaflets. There is no aortic valve stenosis. There is mild aortic regurgitation. AI VMax is 4.49 m/s. AI pressure half time is 602 msec. AI slope is 2.18 m/s².     Mitral Valve:  Structurally normal mitral valve with normal leaflet excursion. There is mild posterior calcification of the mitral valve annulus. There is mild mitral regurgitation.     Tricuspid Valve:  Structurally normal tricuspid valve with normal leaflet excursion. There is trace tricuspid regurgitation.     Pulmonic Valve:  The pulmonic valve was not well visualized.     Aorta:  The aortic root at the sinuses of Valsalva is normal in size, measuring 2.80 cm (indexed 1.46cm/m²). The aortic arch diameter is normal in size, measuring 2.2 cm (indexed 1.15 cm/m²).     Pericardium:  There is a small pericardial effusion noted adjacent to the posterior left ventricle.     Systemic Veins:  The inferior vena cava is normal in size measuring 1.33 cm in diameter, (normal <2.1cm) with normal inspiratory collapse (normal >50%) consistent with normal right atrial pressure (~3, range 0-5mmHg).  ____________________________________________________________________  QUANTITATIVE DATA:  Left Ventricle Measurements: (Indexed to BSA)     IVSd (2D):   1.1 cm  LVPWd (2D):  1.1 cm  LVIDd (2D):  4.9 cm  LVIDs (2D):  3.7 cm  LV Mass:     209 g  109.0 g/m²  LV Vol d, MOD A2C: 130.0 ml 67.85 ml/m²  LV Vol d, MOD A4C: 128.0 ml 66.81 ml/m²  LV Vol d, MOD BP:  129.5 ml 67.60 ml/m²  LV Vol s, MOD A2C: 63.4 ml  33.09 ml/m²  LV Vol s, MOD A4C: 67.1 ml  35.02 ml/m²  LV Vol s, MOD BP:  65.3 ml  34.06 ml/m²  LVOT SV MOD BP:    64.3 ml  LV EF% MOD BP:     50 %     MV E Vmax:    0.98 m/s  MV A Vmax:    0.39 m/s  MV E/A:       2.51  e' lateral:   9.14 cm/s  e' medial:    7.18 cm/s  E/e' lateral: 10.78  E/e' medial:  13.72  E/e' Average: 12.07  MV DT:        208 msec    Aorta Measurements: (normal range) (Indexed to BSA)     Sinuses of Valsalva: 2.80 cm (3.1 - 3.7 cm)  Ao Arch:             2.2 cm       Left Atrium Measurements: (Indexed to BSA)  LA Diam 2D: 3.50 cm    Right Atrial Measurements:     RA Vol:       45.30 ml  RA Vol Index: 23.64 ml/m²       LVOT / RVOT/ Qp/Qs Data: (Indexedto BSA)  LVOT Diameter: 2.10 cm    Aortic Valve Measurements:  AR Vmax  4.49 m/s  AR PHT   602 msec  AR [place] 2.18 m/s²    Mitral Valve Measurements:     MV E Vmax: 1.0 m/s         MR Vmax:          5.11 m/s  MV A Vmax: 0.4 m/s         MR Peak Gradient: 104.4 mmHg  MV E/A:    2.5       Tricuspid Valve Measurements:     RA Pressure: 3 mmHg    ________________________________________________________________________________________  Electronically signed on 12/21/2023 at 5:11:05 PM by Maggie Colin MD         *** Final ***      [facility] -- Roxi Pardo Pannella, Patel, Savella, Goodger, Cohen  Office # [phone]    Follow Up:  AF    Subjective/Observations: Patient seen and examined, awake, alert, resting comfortably in bed, denies chest pain, dyspnea, palpitations or dizziness, orthopnea and PND. Tolerating room air.no events overnight     REVIEW OF SYSTEMS: All other review of systems is negative unless indicated above  PAST MEDICAL & SURGICAL HISTORY:  Essential hypertension      Gastroesophageal reflux disease, esophagitis presence not specified      HTN (hypertension)      Hyperlipidemia      CAD (coronary artery disease)  stents x 2      CAD S/P percutaneous coronary angioplasty  with ANAHI        MEDICATIONS  (STANDING):  amLODIPine   Tablet 10 milliGRAM(s) Oral daily  aspirin enteric coated 81 milliGRAM(s) Oral daily  atenolol  Tablet 50 milliGRAM(s) Oral two times a day  atorvastatin 40 milliGRAM(s) Oral at bedtime  folic acid 1 milliGRAM(s) Oral daily  furosemide    Tablet 40 milliGRAM(s) Oral daily  hydrALAZINE 25 milliGRAM(s) Oral three times a day  influenza  Vaccine (HIGH DOSE) 0.7 milliLiter(s) IntraMuscular once  mupirocin 2% Nasal 1 Application(s) Both Nostrils two times a day  rivaroxaban 15 milliGRAM(s) Oral with dinner  tamsulosin 0.4 milliGRAM(s) Oral at bedtime    MEDICATIONS  (PRN):    Allergies    No Known Allergies    Intolerances      Vital Signs Last 24 Hrs  T(C): 36.3 (23 Dec 2023 11:34), Max: 36.6 (23 Dec 2023 04:39)  T(F): 97.3 (23 Dec 2023 11:34), Max: 97.8 (23 Dec 2023 04:39)  HR: 73 (23 Dec 2023 11:34) (73 - 103)  BP: 113/72 (23 Dec 2023 11:34) (113/72 - 127/83)  BP(mean): --  RR: 18 (23 Dec 2023 11:34) (18 - 18)  SpO2: 91% (23 Dec 2023 11:34) (91% - 95%)    Parameters below as of 23 Dec 2023 11:34  Patient On (Oxygen Delivery Method): room air      I&O's Summary    22 Dec 2023 07:01  -  23 Dec 2023 07:00  --------------------------------------------------------  IN: 0 mL / OUT: 600 mL / NET: -600 mL          TELE: AF 80's   PHYSICAL EXAM:  Constitutional: NAD, awake and alert  HEENT: Moist Mucous Membranes, Anicteric  Pulmonary: Non-labored, breath sounds are clear bilaterally, No wheezing, rales or rhonchi  Cardiovascular: IRRR, S1 and S2, No murmurs, rubs, gallops or clicks  Gastrointestinal: Bowel Sounds present, soft, nontender.   Lymph: No peripheral edema. No lymphadenopathy.  Skin: No visible rashes or ulcers.  Psych:  Mood & affect appropriate  LABS: All Labs Reviewed:                        13.6   9.89  )-----------( 234      ( 23 Dec 2023 06:35 )             42.0                         13.8   12.48 )-----------( 241      ( 22 Dec 2023 05:30 )             43.1                         14.3   17.69 )-----------( 257      ( 21 Dec 2023 09:45 )             44.0     23 Dec 2023 06:35    140    |  108    |  36     ----------------------------<  97     3.6     |  25     |  1.80   22 Dec 2023 05:30    140    |  109    |  33     ----------------------------<  113    3.9     |  24     |  1.80   21 Dec 2023 04:28    139    |  111    |  38     ----------------------------<  156    4.0     |  20     |  2.00     Ca    8.8        23 Dec 2023 06:35  Ca    8.4        22 Dec 2023 05:30  Ca    8.2        21 Dec 2023 04:28  Phos  2.3       23 Dec 2023 06:35  Phos  2.6       22 Dec 2023 05:30  Mg     2.3       23 Dec 2023 06:35  Mg     2.3       22 Dec 2023 05:30  Mg     1.9       20 Dec 2023 22:50    TPro  7.1    /  Alb  3.1    /  TBili  0.8    /  DBili  x      /  AST  23     /  ALT  34     /  AlkPhos  97     23 Dec 2023 06:35  TPro  7.0    /  Alb  3.2    /  TBili  1.3    /  DBili  x      /  AST  19     /  ALT  27     /  AlkPhos  90     22 Dec 2023 05:30  TPro  7.2    /  Alb  3.6    /  TBili  0.5    /  DBili  x      /  AST  18     /  ALT  33     /  AlkPhos  115    20 Dec 2023 22:50    PT/INR - ( 22 Dec 2023 05:30 )   PT: 21.4 sec;   INR: 1.86 ratio         PTT - ( 22 Dec 2023 05:30 )  PTT:37.3 sec      12 Lead ECG:   Ventricular Rate 71 BPM    Atrial Rate 71 BPM    P-R Interval 180 ms    QRS Duration 106 ms    Q-T Interval 432 ms    QTC Calculation(Bazett) 469 ms    P Axis 50 degrees    R Axis -31 degrees    T Axis 66 degrees    Diagnosis Line Normal sinus rhythm  Left axis deviation  Confirmed by KALYN TAN ([id number]) on 12/21/2023 1:13:05 PM (12-21-23 @ 03:17)      TRANSTHORACIC ECHOCARDIOGRAM REPORT  ________________________________________________________________________________                                      _______       Pt. Name:       TRUDI COOLEY Study Date:    12/21/2023  MRN:            [id number]      YOB: 1950  Accession #:    [id number]     Age:           73 years  Account#:       [id number]    Gender:        M  Heart Rate:                   Height:        64.17 in (163.00 cm)  Rhythm:                       Weight:        189.59 lb (86.00 kg)  Blood Pressure: 136/61 mmHg   BSA/BMI:       1.92 m² / 32.37 kg/m²  ________________________________________________________________________________________  Referring Physician: [id number] Claudy Real  Primary Sonographer: Yessica Rdz RDCS    CPT:               ECHO TTE WO CON COMP W DOPP - 70373.m  Indication(s):     Dyspnea, unspecified - R06.00  Procedure:         Transthoracic echocardiogram with 2-D, M-mode and complete                     spectral and color flow Doppler.  Ordering Location: [facility]  Study Information: Image quality for this study is technically difficult.    _______________________________________________________________________________________     CONCLUSIONS:      1. Technically difficult image quality.   2. Left ventricular systolic function is low-normal with a LVEF estimated at 50-55%.   3. Normal right ventricular cavity size, wall thickness, and systolic function.   4. Mild mitral regurgitation.   5. Mild aortic regurgitation.   6. Tracetricuspid regurgitation.   7. Small pericardial effusion noted adjacent to the posterior left ventricle associated with a large anterior fat pad, without diastolic RV collapse.   8. The inferior vena cava is normal in size measuring 1.33 cm in diameter, (normal <2.1cm) with normal inspiratory collapse (normal >50%) consistent with normal right atrial pressure (~3, range 0-5mmHg).    ________________________________________________________________________________________  FINDINGS:     Left Ventricle:  Left ventricular systolic function is low normal with a calculated ejection fraction of 50-55% by the Burns's biplane method of disks. The left ventricular diastolic function is indeterminate.     Right Ventricle:  The right ventricular cavityis normal in size, normal wall thickness and normal systolic function.     Left Atrium:  The left atrium is normal.     Right Atrium:  The right atrium is normal in size with an indexed volume of 23.64 ml/m².     Aortic Valve:  The aortic valve anatomy cannot be determined with normal systolic excursion. There is moderate calcification of the aortic valve leaflets. There is no aortic valve stenosis. There is mild aortic regurgitation. AI VMax is 4.49 m/s. AI pressure half time is 602 msec. AI slope is 2.18 m/s².     Mitral Valve:  Structurally normal mitral valve with normal leaflet excursion. There is mild posterior calcification of the mitral valve annulus. There is mild mitral regurgitation.     Tricuspid Valve:  Structurally normal tricuspid valve with normal leaflet excursion. There is trace tricuspid regurgitation.     Pulmonic Valve:  The pulmonic valve was not well visualized.     Aorta:  The aortic root at the sinuses of Valsalva is normal in size, measuring 2.80 cm (indexed 1.46cm/m²). The aortic arch diameter is normal in size, measuring 2.2 cm (indexed 1.15 cm/m²).     Pericardium:  There is a small pericardial effusion noted adjacent to the posterior left ventricle.     Systemic Veins:  The inferior vena cava is normal in size measuring 1.33 cm in diameter, (normal <2.1cm) with normal inspiratory collapse (normal >50%) consistent with normal right atrial pressure (~3, range 0-5mmHg).  ____________________________________________________________________  QUANTITATIVE DATA:  Left Ventricle Measurements: (Indexed to BSA)     IVSd (2D):   1.1 cm  LVPWd (2D):  1.1 cm  LVIDd (2D):  4.9 cm  LVIDs (2D):  3.7 cm  LV Mass:     209 g  109.0 g/m²  LV Vol d, MOD A2C: 130.0 ml 67.85 ml/m²  LV Vol d, MOD A4C: 128.0 ml 66.81 ml/m²  LV Vol d, MOD BP:  129.5 ml 67.60 ml/m²  LV Vol s, MOD A2C: 63.4 ml  33.09 ml/m²  LV Vol s, MOD A4C: 67.1 ml  35.02 ml/m²  LV Vol s, MOD BP:  65.3 ml  34.06 ml/m²  LVOT SV MOD BP:    64.3 ml  LV EF% MOD BP:     50 %     MV E Vmax:    0.98 m/s  MV A Vmax:    0.39 m/s  MV E/A:       2.51  e' lateral:   9.14 cm/s  e' medial:    7.18 cm/s  E/e' lateral: 10.78  E/e' medial:  13.72  E/e' Average: 12.07  MV DT:        208 msec    Aorta Measurements: (normal range) (Indexed to BSA)     Sinuses of Valsalva: 2.80 cm (3.1 - 3.7 cm)  Ao Arch:             2.2 cm       Left Atrium Measurements: (Indexed to BSA)  LA Diam 2D: 3.50 cm    Right Atrial Measurements:     RA Vol:       45.30 ml  RA Vol Index: 23.64 ml/m²       LVOT / RVOT/ Qp/Qs Data: (Indexedto BSA)  LVOT Diameter: 2.10 cm    Aortic Valve Measurements:  AR Vmax  4.49 m/s  AR PHT   602 msec  AR [place] 2.18 m/s²    Mitral Valve Measurements:     MV E Vmax: 1.0 m/s         MR Vmax:          5.11 m/s  MV A Vmax: 0.4 m/s         MR Peak Gradient: 104.4 mmHg  MV E/A:    2.5       Tricuspid Valve Measurements:     RA Pressure: 3 mmHg    ________________________________________________________________________________________  Electronically signed on 12/21/2023 at 5:11:05 PM by Maggie Colin MD         *** Final ***

## 2023-12-23 NOTE — DISCHARGE NOTE PROVIDER - PROVIDER TOKENS
PROVIDER:[TOKEN:[[id number]]],PROVIDER:[[id number]]],PROVIDER:[TOKEN:[[id number]]] PROVIDER:[TOKEN:[[id number]]],PROVIDER:[TOKEN:[[id number]]],PROVIDER:[TOKEN:[[id number]]]

## 2023-12-23 NOTE — PROGRESS NOTE ADULT - SUBJECTIVE AND OBJECTIVE BOX
[facility]  INFECTIOUS DISEASES - Gael Doherty, [address], [place] [address]  Tel: [phone]     Fax: [phone]  =======================================================    TRUDI COOLEY [id number]    Follow up: No fevers. Now on room air. Denies any SOB or cough. Denies any pain.    Allergies:  No Known Allergies      Antibiotics:  amLODIPine   Tablet 10 milliGRAM(s) Oral daily  aspirin enteric coated 81 milliGRAM(s) Oral daily  atenolol  Tablet 50 milliGRAM(s) Oral two times a day  atorvastatin 40 milliGRAM(s) Oral at bedtime  folic acid 1 milliGRAM(s) Oral daily  furosemide    Tablet 40 milliGRAM(s) Oral daily  hydrALAZINE 25 milliGRAM(s) Oral three times a day  influenza  Vaccine (HIGH DOSE) 0.7 milliLiter(s) IntraMuscular once  mupirocin 2% Nasal 1 Application(s) Both Nostrils two times a day  rivaroxaban 15 milliGRAM(s) Oral with dinner  tamsulosin 0.4 milliGRAM(s) Oral at bedtime       REVIEW OF SYSTEMS:  CONSTITUTIONAL:  No Fever or chills  HEENT:  No sore throat or runny nose.  CARDIOVASCULAR:  No chest pain   RESPIRATORY:  see history  GASTROINTESTINAL:  No nausea, vomiting or diarrhea.  GENITOURINARY:  No dysuria, frequency or urgency  MUSCULOSKELETAL:  no joint aches, no back pain  NEUROLOGIC:  No headache or dizziness  PSYCHIATRIC:  No disorder of thought or mood.     Physical Exam:  ICU Vital Signs Last 24 Hrs  T(C): 36.3 (23 Dec 2023 11:34), Max: 36.6 (23 Dec 2023 04:39)  T(F): 97.3 (23 Dec 2023 11:34), Max: 97.8 (23 Dec 2023 04:39)  HR: 73 (23 Dec 2023 11:34) (73 - 103)  BP: 113/72 (23 Dec 2023 11:34) (113/72 - 127/83)  BP(mean): --  ABP: --  ABP(mean): --  RR: 18 (23 Dec 2023 11:34) (18 - 18)  SpO2: 93% (23 Dec 2023 13:05) (91% - 98%)    O2 Parameters below as of 23 Dec 2023 13:05  Patient On (Oxygen Delivery Method): room air, while pt is talking      GEN: NAD  HEENT: normocephalic and atraumatic.   NECK: Supple.   LUNGS: Normal respiratory effort  HEART: Regular rate and rhythm   ABDOMEN: Soft, nontender, and nondistended.    EXTREMITIES: No leg edema.  NEUROLOGIC: AO x 3  PSYCHIATRIC: Appropriate affect .    Labs:  12-23    140  |  108  |  36<H>  ----------------------------<  97  3.6   |  25  |  1.80<H>    Ca    8.8      23 Dec 2023 06:35  Phos  2.3     12-23  Mg     2.3     12-23    TPro  7.1  /  Alb  3.1<L>  /  TBili  0.8  /  DBili  x   /  AST  23  /  ALT  34  /  AlkPhos  97  12-23                          13.6   9.89  )-----------( 234      ( 23 Dec 2023 06:35 )             42.0     PT/INR - ( 22 Dec 2023 05:30 )   PT: 21.4 sec;   INR: 1.86 ratio         PTT - ( 22 Dec 2023 05:30 )  PTT:37.3 sec  Urinalysis Basic - ( 23 Dec 2023 06:35 )    Color: x / Appearance: x / SG: x / pH: x  Gluc: 97 mg/dL / Ketone: x  / Bili: x / Urobili: x   Blood: x / Protein: x / Nitrite: x   Leuk Esterase: x / RBC: x / WBC x   Sq Epi: x / Non Sq Epi: x / Bacteria: x      LIVER FUNCTIONS - ( 23 Dec 2023 06:35 )  Alb: 3.1 g/dL / Pro: 7.1 g/dL / ALK PHOS: 97 U/L / ALT: 34 U/L / AST: 23 U/L / GGT: x             RECENT CULTURES:  12-21 @ 01:00 .Blood Blood-Peripheral     No growth at 48 Hours        12-21 @ 00:50 .Blood Blood-Peripheral     No growth at 48 Hours        12-20 @ 22:50          NotDetec        All imaging and data are reviewed.    [facility]  INFECTIOUS DISEASES - Gael Doherty, [address], [place] [address]  Tel: [phone]     Fax: [phone]  =======================================================    TRUDI COOLEY [id number]    Follow up: No fevers. Now on room air. Denies any SOB or cough. Denies any pain.    Allergies:  No Known Allergies      Antibiotics:  amLODIPine   Tablet 10 milliGRAM(s) Oral daily  aspirin enteric coated 81 milliGRAM(s) Oral daily  atenolol  Tablet 50 milliGRAM(s) Oral two times a day  atorvastatin 40 milliGRAM(s) Oral at bedtime  folic acid 1 milliGRAM(s) Oral daily  furosemide    Tablet 40 milliGRAM(s) Oral daily  hydrALAZINE 25 milliGRAM(s) Oral three times a day  influenza  Vaccine (HIGH DOSE) 0.7 milliLiter(s) IntraMuscular once  mupirocin 2% Nasal 1 Application(s) Both Nostrils two times a day  rivaroxaban 15 milliGRAM(s) Oral with dinner  tamsulosin 0.4 milliGRAM(s) Oral at bedtime       REVIEW OF SYSTEMS:  CONSTITUTIONAL:  No Fever or chills  HEENT:  No sore throat or runny nose.  CARDIOVASCULAR:  No chest pain   RESPIRATORY:  see history  GASTROINTESTINAL:  No nausea, vomiting or diarrhea.  GENITOURINARY:  No dysuria, frequency or urgency  MUSCULOSKELETAL:  no joint aches, no back pain  NEUROLOGIC:  No headache or dizziness  PSYCHIATRIC:  No disorder of thought or mood.     Physical Exam:  ICU Vital Signs Last 24 Hrs  T(C): 36.3 (23 Dec 2023 11:34), Max: 36.6 (23 Dec 2023 04:39)  T(F): 97.3 (23 Dec 2023 11:34), Max: 97.8 (23 Dec 2023 04:39)  HR: 73 (23 Dec 2023 11:34) (73 - 103)  BP: 113/72 (23 Dec 2023 11:34) (113/72 - 127/83)  BP(mean): --  ABP: --  ABP(mean): --  RR: 18 (23 Dec 2023 11:34) (18 - 18)  SpO2: 93% (23 Dec 2023 13:05) (91% - 98%)    O2 Parameters below as of 23 Dec 2023 13:05  Patient On (Oxygen Delivery Method): room air, while pt is talking      GEN: NAD  HEENT: normocephalic and atraumatic.   NECK: Supple.   LUNGS: Normal respiratory effort  HEART: Regular rate and rhythm   ABDOMEN: Soft, nontender, and nondistended.    EXTREMITIES: No leg edema.  NEUROLOGIC: AO x 3  PSYCHIATRIC: Appropriate affect .    Labs:  12-23    140  |  108  |  36<H>  ----------------------------<  97  3.6   |  25  |  1.80<H>    Ca    8.8      23 Dec 2023 06:35  Phos  2.3     12-23  Mg     2.3     12-23    TPro  7.1  /  Alb  3.1<L>  /  TBili  0.8  /  DBili  x   /  AST  23  /  ALT  34  /  AlkPhos  97  12-23                          13.6   9.89  )-----------( 234      ( 23 Dec 2023 06:35 )             42.0     PT/INR - ( 22 Dec 2023 05:30 )   PT: 21.4 sec;   INR: 1.86 ratio         PTT - ( 22 Dec 2023 05:30 )  PTT:37.3 sec  Urinalysis Basic - ( 23 Dec 2023 06:35 )    Color: x / Appearance: x / SG: x / pH: x  Gluc: 97 mg/dL / Ketone: x  / Bili: x / Urobili: x   Blood: x / Protein: x / Nitrite: x   Leuk Esterase: x / RBC: x / WBC x   Sq Epi: x / Non Sq Epi: x / Bacteria: x      LIVER FUNCTIONS - ( 23 Dec 2023 06:35 )  Alb: 3.1 g/dL / Pro: 7.1 g/dL / ALK PHOS: 97 U/L / ALT: 34 U/L / AST: 23 U/L / GGT: x             RECENT CULTURES:  12-21 @ 01:00 .Blood Blood-Peripheral     No growth at 48 Hours        12-21 @ 00:50 .Blood Blood-Peripheral     No growth at 48 Hours        12-20 @ 22:50          NotDetec        All imaging and data are reviewed.

## 2023-12-23 NOTE — PROGRESS NOTE ADULT - SUBJECTIVE AND OBJECTIVE BOX
Date/Time Patient Seen:  		  Referring MD:   Data Reviewed	       Patient is a 73y old  Male who presents with a chief complaint of resp fail (22 Dec 2023 20:18)      Subjective/HPI     PAST MEDICAL & SURGICAL HISTORY:  Essential hypertension    Gastroesophageal reflux disease, esophagitis presence not specified    HTN (hypertension)    Hyperlipidemia    CAD (coronary artery disease)  stents x 2    No significant past surgical history    No significant past surgical history    CAD S/P percutaneous coronary angioplasty  with ANAHI          Medication list         MEDICATIONS  (STANDING):  amLODIPine   Tablet 10 milliGRAM(s) Oral daily  aspirin enteric coated 81 milliGRAM(s) Oral daily  atenolol  Tablet 50 milliGRAM(s) Oral two times a day  atorvastatin 40 milliGRAM(s) Oral at bedtime  chlorhexidine 2% Cloths 1 Application(s) Topical every 24 hours  folic acid 1 milliGRAM(s) Oral daily  furosemide   Injectable 80 milliGRAM(s) IV Push daily  hydrALAZINE 25 milliGRAM(s) Oral three times a day  influenza  Vaccine (HIGH DOSE) 0.7 milliLiter(s) IntraMuscular once  mupirocin 2% Nasal 1 Application(s) Both Nostrils two times a day  rivaroxaban 15 milliGRAM(s) Oral with dinner    MEDICATIONS  (PRN):         Vitals log        ICU Vital Signs Last 24 Hrs  T(C): 36.6 (23 Dec 2023 04:39), Max: 37 (22 Dec 2023 12:00)  T(F): 97.8 (23 Dec 2023 04:39), Max: 98.6 (22 Dec 2023 12:00)  HR: 73 (23 Dec 2023 04:39) (73 - 128)  BP: 127/83 (23 Dec 2023 04:39) (102/60 - 128/71)  BP(mean): 94 (22 Dec 2023 12:00) (75 - 99)  ABP: --  ABP(mean): --  RR: 18 (23 Dec 2023 04:39) (18 - 24)  SpO2: 95% (23 Dec 2023 04:39) (91% - 97%)    O2 Parameters below as of 23 Dec 2023 04:39  Patient On (Oxygen Delivery Method): nasal cannula                 Input and Output:  I&O's Detail    21 Dec 2023 07:01  -  22 Dec 2023 07:00  --------------------------------------------------------  IN:    Nitroglycerin: 7.5 mL  Total IN: 7.5 mL    OUT:    Voided (mL): 1900 mL  Total OUT: 1900 mL    Total NET: -1892.5 mL      22 Dec 2023 07:01  -  23 Dec 2023 05:29  --------------------------------------------------------  IN:  Total IN: 0 mL    OUT:    Voided (mL): 600 mL  Total OUT: 600 mL    Total NET: -600 mL          Lab Data                        13.8   12.48 )-----------( 241      ( 22 Dec 2023 05:30 )             43.1     12-22    140  |  109<H>  |  33<H>  ----------------------------<  113<H>  3.9   |  24  |  1.80<H>    Ca    8.4<L>      22 Dec 2023 05:30  Phos  2.6     12-22  Mg     2.3     12-22    TPro  7.0  /  Alb  3.2<L>  /  TBili  1.3<H>  /  DBili  x   /  AST  19  /  ALT  27  /  AlkPhos  90  12-22            Review of Systems	      Objective     Physical Examination    heart s1s2  lung dc BS  head nc      Pertinent Lab findings & Imaging      Molly:  NO   Adequate UO     I&O's Detail    21 Dec 2023 07:01  -  22 Dec 2023 07:00  --------------------------------------------------------  IN:    Nitroglycerin: 7.5 mL  Total IN: 7.5 mL    OUT:    Voided (mL): 1900 mL  Total OUT: 1900 mL    Total NET: -1892.5 mL      22 Dec 2023 07:01  -  23 Dec 2023 05:29  --------------------------------------------------------  IN:  Total IN: 0 mL    OUT:    Voided (mL): 600 mL  Total OUT: 600 mL    Total NET: -600 mL               Discussed with:     Cultures:	        Radiology

## 2023-12-23 NOTE — PROGRESS NOTE ADULT - ASSESSMENT
73 M history CAD with stents, afib, hypertension, hyperlipidemia here complaining of heavy breathing. patient reports yesterday he had a mild cough then about 45 minutes prior to arrival patient developed difficulty breathing.    resp distress  eval for Pulm Edema  HTN  AF  CAD  HF eval  CKD  BMI > 30    ct chest - pulm edema - small eff -   diuresis in progress    doubt PNA  cxr more consistent with Pulm Edema and CHF  Diuresis in progress  BP control  I and O  serial renal indices  check Biomarkers - Cx -   HANK eval as outpatient  cvs rx regimen optimization  cardio eval  NIV use and wean as tolerated  spoke with WIFE  dietary discretion for CAD CHF  follows with Dr Lomas in the office for Cardio  TTE -

## 2023-12-23 NOTE — DISCHARGE NOTE PROVIDER - NSDCMRMEDTOKEN_GEN_ALL_CORE_FT
amLODIPine 10 mg oral tablet: 1 tab(s) orally once a day  aspirin 81 mg oral tablet: 1 tab(s) orally once a day  atenolol 50 mg oral tablet: 1 tab(s) orally 2 times a day  atorvastatin 40 mg oral tablet: 1 tab(s) orally once a day (at bedtime)  folic acid 1 mg oral tablet: 1 tab(s) orally once a day  furosemide 40 mg oral tablet: 1 tab(s) orally once a day  tamsulosin 0.4 mg oral capsule: 1 cap(s) orally once a day  Xarelto 20 mg oral tablet: 1 tab(s) orally once a day (at bedtime)   amLODIPine 10 mg oral tablet: 1 tab(s) orally once a day  aspirin 81 mg oral tablet: 1 tab(s) orally once a day  atenolol 50 mg oral tablet: 1 tab(s) orally 2 times a day  atorvastatin 40 mg oral tablet: 1 tab(s) orally once a day (at bedtime)  folic acid 1 mg oral tablet: 1 tab(s) orally once a day  furosemide 40 mg oral tablet: 1 tab(s) orally once a day  hydrALAZINE 25 mg oral tablet: 1 tab(s) orally 3 times a day  tamsulosin 0.4 mg oral capsule: 1 cap(s) orally once a day  Xarelto 20 mg oral tablet: 1 tab(s) orally once a day (at bedtime)

## 2023-12-23 NOTE — CHART NOTE - NSCHARTNOTEFT_GEN_A_CORE
Do you have Advance Directives (HCP / LV / Organ donation / Documentation of oral advance Directive):   (  x  )  yes    (      )    NO                                                                            Do you have LV - Living will :                                                                                                                                             ( x   )  yes    (      )   No    Do you have HCP - Health Care Proxy:                                                                                                                            (  x   )  yes   (       ) N0    Do you have DNR- Do Not Resuscitate :                                                                                                                           (      )  yes  (   x     )  No    Do you have DNI- Do Not intubate  :                                                                                                                               (      )  yes   (    x   ) No    Do you have MOLST - Medical orders for Life sustaining treatment  :                                                                    (      ) yes    (     x  ) No    Decision Maker :  (    x ) Patient     (      )  HCA   (     ) Public Health Law Surrogate     (      ) Surrogate  (       ) Guardian    Goals of Care :  (     x )   Complete Care     (       ) No Limitations                              (       )   Comfort Care       (       )  Hospice                               (      )   Limited medical Intervention / s    Medical Interventions :   (  x      )   CPR       (        )  DNR                                               (     x   )  Intubation with MV - Mechanical Ventilation  ( x     ) BIPAP/CPAP    (         )   DNI                                               (   x      )  Artificial Nutrition -  IVF, TPN / PPN, Tube Feeds             (         )   No Feeding Tube                                                (   x     ) Use Antibiotics                         (          ) No Antibiotics                                                (    x     ) Blood and Blood Products     (         )   No Blood or Blood products                                                (    x      )  Dialysis                                    (         )  No Dialysis                                                (          )  Medical Management only  (         )  No Invasive Interventions or Surgery  Time spent :                        (    x   ) upto 30 minutes                       (           )   more than 30 minutes  ACP reviewed and discussed

## 2023-12-23 NOTE — DISCHARGE NOTE PROVIDER - CARE PROVIDERS DIRECT ADDRESSES
,DirectAddress_Unknown,DirectAddress_Unknown,[email or web address] ,DirectAddress_Unknown,DirectAddress_Unknown,[email or web address]

## 2023-12-23 NOTE — PROGRESS NOTE ADULT - ASSESSMENT
73M with PMHx CAD sp PCI/ANAHI x 2 to LAD/Ramus 2016, MI 2016, HTN, HLD, AF on AC, CKD, stress echo 2018 EF 59%, who presented with SOB, JORDAN and congestion which has worsened in last day. Found to have acute hypoxic respiratory failure 2/2 acute decompensated heart failure. CXR appears more consistent with CHF than pneumonia.     Appears improved from respiratory standpoint, stable off antibiotics. No fever and leukocytosis resolved. Urine legionella antigen negative. Blood cultures remain no growth.     #Acute hypoxic respiratory failure  #Leukocytosis  #Positive staph aureus nasal PCR    -observe off antibiotics  -nasal mupirocin per hospital protocol  -follow blood cultures to completion  -will sign off, please call ID if any further questions. Thank you.    Marva Sanabria MD  Division of Infectious Diseases   Cell [phone] between 8am and 6pm   After 6pm and weekends please call ID service at [phone].     35 minutes spent on total encounter assessing patient, examination, chart review, counseling and coordinating care by the attending physician/nurse/care manager.      73M with PMHx CAD sp PCI/ANAHI x 2 to LAD/Ramus 2016, MI 2016, HTN, HLD, AF on AC, CKD, stress echo 2018 EF 59%, who presented with SOB, JORDAN and congestion which has worsened in last day. Found to have acute hypoxic respiratory failure 2/2 acute decompensated heart failure. CXR appears more consistent with CHF than pneumonia.     Appears improved from respiratory standpoint, stable off antibiotics. No fever and leukocytosis resolved. Urine legionella antigen negative. Blood cultures remain no growth.     #Acute hypoxic respiratory failure  #Leukocytosis  #Positive staph aureus nasal PCR    -observe off antibiotics  -nasal mupirocin per hospital protocol  -follow blood cultures to completion  -will sign off, please call ID if any further questions. Thank you.    Marva Sanabria MD  Division of Infectious Diseases   Cell [phone] between 8am and 6pm   After 6pm and weekends please call ID service at [phone].     35 minutes spent on total encounter assessing patient, examination, chart review, counseling and coordinating care by the attending physician/nurse/care manager.

## 2023-12-23 NOTE — PROGRESS NOTE ADULT - ASSESSMENT
a 73yoM with a PMH of CAD sp PCI/ANAHI x 2 to LAD/Ramus 2016, MI 2016, HTN, HLD, AF on AC, CKD, stress echo 2018 EF 59% who presents to ED BIBEMS with SOB, JORDAN and congestion which has worsened in last day.    JORDAN  - no sign of acute ischemia and troponin are negative  - no anginal complaints  - continue ASA, statin     - CXR significant for b/l infiltrates --> PNA vs pulmonary edema   - TTE 2018: EF 59%  - TTE (12/21/23) LVSF low normal with EF 50-55% Mild mitral regurgitation, AR, Small pericardial effusion noted adjacent to the posterior left ventricle associated with a large anterior fat pad, without diastolic RV collapse.  - s/p IV Lasix   - better compensated from HF POV, continue Lasix PO daily    - known Afib on Xarelto  - BP improved, s/p nitro gtt 110- 120's systolics   - controlled rates in the 80's on tele overnight, continue tele monitoring for now   - cont hydralazine, amlodipine, atenolol with hold parameters  - Monitor and replete Lytes. Keep K > 4 and Mg > 2    - Will continue to follow.    Maude Newton, [facility]  Nurse Practitioner - Cardiology   call TEAMS   a 73yoM with a PMH of CAD sp PCI/ANAHI x 2 to LAD/Ramus 2016, MI 2016, HTN, HLD, AF on AC, CKD, stress echo 2018 EF 59% who presents to ED BIBEMS with SOB, JORDAN and congestion which has worsened in last day.    JORDAN  - no sign of acute ischemia and troponin are negative  - no anginal complaints  - continue ASA, statin     - CXR significant for b/l infiltrates --> PNA vs pulmonary edema   - TTE 2018: EF 59%  - TTE (12/21/23) LVSF low normal with EF 50-55% Mild mitral regurgitation, AR, Small pericardial effusion noted adjacent to the posterior left ventricle associated with a large anterior fat pad, without diastolic RV collapse.  - s/p IV Lasix   - better compensated from HF POV, continue Lasix PO daily    - known Afib on Xarelto  - BP improved, s/p nitro gtt 110- 120's systolics   - controlled rates in the 80's on tele overnight, continue tele monitoring for now   - cont hydralazine, amlodipine, atenolol with hold parameters  - Monitor and replete Lytes. Keep K > 4 and Mg > 2    - Will continue to follow.    Maude Newton, [facility]  Nurse Practitioner - Cardiology   call TEAMS

## 2023-12-24 ENCOUNTER — TRANSCRIPTION ENCOUNTER (OUTPATIENT)
Age: 73
End: 2023-12-24

## 2023-12-24 VITALS
RESPIRATION RATE: 17 BRPM | HEART RATE: 73 BPM | DIASTOLIC BLOOD PRESSURE: 76 MMHG | SYSTOLIC BLOOD PRESSURE: 135 MMHG | OXYGEN SATURATION: 99 % | TEMPERATURE: 97 F

## 2023-12-24 PROCEDURE — 87641 MR-STAPH DNA AMP PROBE: CPT

## 2023-12-24 PROCEDURE — 99285 EMERGENCY DEPT VISIT HI MDM: CPT | Mod: 25

## 2023-12-24 PROCEDURE — 0225U NFCT DS DNA&RNA 21 SARSCOV2: CPT

## 2023-12-24 PROCEDURE — 84100 ASSAY OF PHOSPHORUS: CPT

## 2023-12-24 PROCEDURE — 83605 ASSAY OF LACTIC ACID: CPT

## 2023-12-24 PROCEDURE — 83880 ASSAY OF NATRIURETIC PEPTIDE: CPT

## 2023-12-24 PROCEDURE — 94660 CPAP INITIATION&MGMT: CPT

## 2023-12-24 PROCEDURE — 85027 COMPLETE CBC AUTOMATED: CPT

## 2023-12-24 PROCEDURE — 85610 PROTHROMBIN TIME: CPT

## 2023-12-24 PROCEDURE — 96365 THER/PROPH/DIAG IV INF INIT: CPT

## 2023-12-24 PROCEDURE — 83036 HEMOGLOBIN GLYCOSYLATED A1C: CPT

## 2023-12-24 PROCEDURE — 87637 SARSCOV2&INF A&B&RSV AMP PRB: CPT

## 2023-12-24 PROCEDURE — 93306 TTE W/DOPPLER COMPLETE: CPT

## 2023-12-24 PROCEDURE — 99232 SBSQ HOSP IP/OBS MODERATE 35: CPT

## 2023-12-24 PROCEDURE — 71045 X-RAY EXAM CHEST 1 VIEW: CPT

## 2023-12-24 PROCEDURE — 36415 COLL VENOUS BLD VENIPUNCTURE: CPT

## 2023-12-24 PROCEDURE — 71250 CT THORAX DX C-: CPT

## 2023-12-24 PROCEDURE — 84443 ASSAY THYROID STIM HORMONE: CPT

## 2023-12-24 PROCEDURE — 80048 BASIC METABOLIC PNL TOTAL CA: CPT

## 2023-12-24 PROCEDURE — 86803 HEPATITIS C AB TEST: CPT

## 2023-12-24 PROCEDURE — 93005 ELECTROCARDIOGRAM TRACING: CPT

## 2023-12-24 PROCEDURE — 94640 AIRWAY INHALATION TREATMENT: CPT

## 2023-12-24 PROCEDURE — 87449 NOS EACH ORGANISM AG IA: CPT

## 2023-12-24 PROCEDURE — 82803 BLOOD GASES ANY COMBINATION: CPT

## 2023-12-24 PROCEDURE — 85025 COMPLETE CBC W/AUTO DIFF WBC: CPT

## 2023-12-24 PROCEDURE — 80053 COMPREHEN METABOLIC PANEL: CPT

## 2023-12-24 PROCEDURE — 85730 THROMBOPLASTIN TIME PARTIAL: CPT

## 2023-12-24 PROCEDURE — 87040 BLOOD CULTURE FOR BACTERIA: CPT

## 2023-12-24 PROCEDURE — 87640 STAPH A DNA AMP PROBE: CPT

## 2023-12-24 PROCEDURE — 83735 ASSAY OF MAGNESIUM: CPT

## 2023-12-24 PROCEDURE — 84484 ASSAY OF TROPONIN QUANT: CPT

## 2023-12-24 PROCEDURE — 94760 N-INVAS EAR/PLS OXIMETRY 1: CPT

## 2023-12-24 RX ORDER — HYDRALAZINE HCL 50 MG
1 TABLET ORAL
Qty: 90 | Refills: 0 | DISCHARGE
Start: 2023-12-24 | End: 2024-01-22

## 2023-12-24 RX ORDER — ATENOLOL 25 MG/1
1 TABLET ORAL
Qty: 60 | Refills: 0
Start: 2023-12-24 | End: 2024-01-22

## 2023-12-24 RX ORDER — HYDRALAZINE HCL 50 MG
1 TABLET ORAL
Qty: 90 | Refills: 0
Start: 2023-12-24 | End: 2024-01-22

## 2023-12-24 RX ORDER — FOLIC ACID 0.8 MG
1 TABLET ORAL
Qty: 30 | Refills: 0
Start: 2023-12-24 | End: 2024-01-22

## 2023-12-24 RX ORDER — ATENOLOL 25 MG/1
1 TABLET ORAL
Refills: 0 | DISCHARGE

## 2023-12-24 RX ADMIN — Medication 40 MILLIGRAM(S): at 04:49

## 2023-12-24 RX ADMIN — Medication 1 MILLIGRAM(S): at 12:55

## 2023-12-24 RX ADMIN — Medication 81 MILLIGRAM(S): at 12:55

## 2023-12-24 RX ADMIN — ATENOLOL 50 MILLIGRAM(S): 25 TABLET ORAL at 04:51

## 2023-12-24 RX ADMIN — AMLODIPINE BESYLATE 10 MILLIGRAM(S): 2.5 TABLET ORAL at 04:51

## 2023-12-24 RX ADMIN — Medication 25 MILLIGRAM(S): at 04:50

## 2023-12-24 RX ADMIN — MUPIROCIN 1 APPLICATION(S): 20 OINTMENT TOPICAL at 05:30

## 2023-12-24 NOTE — PROGRESS NOTE ADULT - PROBLEM SELECTOR PLAN 1
lasix for diuresis  cardio eval with Dr. haley
lasix for diuresis  cardio eval with Dr. haley etal appreciated  improved
lasix for diuresis  cardio eval with Dr. haley etal appreciated  improved

## 2023-12-24 NOTE — PROGRESS NOTE ADULT - SUBJECTIVE AND OBJECTIVE BOX
[facility] -- Roxi Pardo Pannella, Patel, Savella, Goodger, Cohen  Office # [phone]    Follow Up:  AF    Subjective/Observations: seen and examined, awake, alert, resting comfortably in bed, denies chest pain, dyspnea, palpitations or dizziness, orthopnea and PND. Tolerating room air.no events overnight     REVIEW OF SYSTEMS: All other review of systems is negative unless indicated above  PAST MEDICAL & SURGICAL HISTORY:  Essential hypertension      Gastroesophageal reflux disease, esophagitis presence not specified      HTN (hypertension)      Hyperlipidemia      CAD (coronary artery disease)  stents x 2      CAD S/P percutaneous coronary angioplasty  with ANAHI        MEDICATIONS  (STANDING):  amLODIPine   Tablet 10 milliGRAM(s) Oral daily  aspirin enteric coated 81 milliGRAM(s) Oral daily  atenolol  Tablet 50 milliGRAM(s) Oral two times a day  atorvastatin 40 milliGRAM(s) Oral at bedtime  folic acid 1 milliGRAM(s) Oral daily  furosemide    Tablet 40 milliGRAM(s) Oral daily  hydrALAZINE 25 milliGRAM(s) Oral three times a day  influenza  Vaccine (HIGH DOSE) 0.7 milliLiter(s) IntraMuscular once  mupirocin 2% Nasal 1 Application(s) Both Nostrils two times a day  rivaroxaban 15 milliGRAM(s) Oral with dinner  tamsulosin 0.4 milliGRAM(s) Oral at bedtime    MEDICATIONS  (PRN):    Allergies    No Known Allergies    Intolerances      Vital Signs Last 24 Hrs  T(C): 36.3 (24 Dec 2023 11:21), Max: 36.5 (23 Dec 2023 22:35)  T(F): 97.4 (24 Dec 2023 11:21), Max: 97.7 (23 Dec 2023 22:35)  HR: 85 (24 Dec 2023 11:21) (68 - 85)  BP: 124/77 (24 Dec 2023 11:21) (113/72 - 141/61)  BP(mean): --  RR: 18 (24 Dec 2023 11:21) (17 - 20)  SpO2: 95% (24 Dec 2023 11:21) (91% - 98%)    Parameters below as of 24 Dec 2023 04:45  Patient On (Oxygen Delivery Method): room air      I&O's Summary        TELE: Af 80  PHYSICAL EXAM:  Constitutional: NAD, awake and alert  HEENT: Moist Mucous Membranes, Anicteric  Pulmonary: Non-labored, breath sounds are clear bilaterally, No wheezing, rales or rhonchi  Cardiovascular: IRRR, S1 and S2, No murmurs, rubs, gallops or clicks  Gastrointestinal: Bowel Sounds present, soft, nontender.   Lymph: No peripheral edema. No lymphadenopathy.  Skin: No visible rashes or ulcers.  Psych:  Mood & affect appropriate  LABS: All Labs Reviewed:                        13.6   9.89  )-----------( 234      ( 23 Dec 2023 06:35 )             42.0                         13.8   12.48 )-----------( 241      ( 22 Dec 2023 05:30 )             43.1     23 Dec 2023 06:35    140    |  108    |  36     ----------------------------<  97     3.6     |  25     |  1.80   22 Dec 2023 05:30    140    |  109    |  33     ----------------------------<  113    3.9     |  24     |  1.80     Ca    8.8        23 Dec 2023 06:35  Ca    8.4        22 Dec 2023 05:30  Phos  2.3       23 Dec 2023 06:35  Phos  2.6       22 Dec 2023 05:30  Mg     2.3       23 Dec 2023 06:35  Mg     2.3       22 Dec 2023 05:30    TPro  7.1    /  Alb  3.1    /  TBili  0.8    /  DBili  x      /  AST  23     /  ALT  34     /  AlkPhos  97     23 Dec 2023 06:35  TPro  7.0    /  Alb  3.2    /  TBili  1.3    /  DBili  x      /  AST  19     /  ALT  27     /  AlkPhos  90     22 Dec 2023 05:30          12 Lead ECG:   Ventricular Rate 71 BPM    Atrial Rate 71 BPM    P-R Interval 180 ms    QRS Duration 106 ms    Q-T Interval 432 ms    QTC Calculation(Bazett) 469 ms    P Axis 50 degrees    R Axis -31 degrees    T Axis 66 degrees    Diagnosis Line Normal sinus rhythm  Left axis deviation  Confirmed by KALYN TAN ([id number]) on 12/21/2023 1:13:05 PM (12-21-23 @ 03:17)      TRANSTHORACIC ECHOCARDIOGRAM REPORT  ________________________________________________________________________________                                      _______       Pt. Name:       TRUDI COOLEY Study Date:    12/21/2023  MRN:            [id number]      YOB: 1950  Accession #:    [id number]     Age:           73 years  Account#:       [id number]    Gender:        M  Heart Rate:                   Height:        64.17 in (163.00 cm)  Rhythm:                       Weight:        189.59 lb (86.00 kg)  Blood Pressure: 136/61 mmHg   BSA/BMI:       1.92 m² / 32.37 kg/m²  ________________________________________________________________________________________  Referring Physician: [id number] Claudy Real  Primary Sonographer: Yessica Rdz CS    CPT:               ECHO TTE WO CON COMP W DOPP - 10867.m  Indication(s):     Dyspnea, unspecified - R06.00  Procedure:         Transthoracic echocardiogram with 2-D, M-mode and complete                     spectral and color flow Doppler.  Ordering Location: [facility]  Study Information: Image quality for this study is technically difficult.    _______________________________________________________________________________________     CONCLUSIONS:      1. Technically difficult image quality.   2. Left ventricular systolic function is low-normal with a LVEF estimated at 50-55%.   3. Normal right ventricular cavity size, wall thickness, and systolic function.   4. Mild mitral regurgitation.   5. Mild aortic regurgitation.   6. Tracetricuspid regurgitation.   7. Small pericardial effusion noted adjacent to the posterior left ventricle associated with a large anterior fat pad, without diastolic RV collapse.   8. The inferior vena cava is normal in size measuring 1.33 cm in diameter, (normal <2.1cm) with normal inspiratory collapse (normal >50%) consistent with normal right atrial pressure (~3, range 0-5mmHg).    ________________________________________________________________________________________  FINDINGS:     Left Ventricle:  Left ventricular systolic function is low normal with a calculated ejection fraction of 50-55% by the Burns's biplane method of disks. The left ventricular diastolic function is indeterminate.     Right Ventricle:  The right ventricular cavityis normal in size, normal wall thickness and normal systolic function.     Left Atrium:  The left atrium is normal.     Right Atrium:  The right atrium is normal in size with an indexed volume of 23.64 ml/m².     Aortic Valve:  The aortic valve anatomy cannot be determined with normal systolic excursion. There is moderate calcification of the aortic valve leaflets. There is no aortic valve stenosis. There is mild aortic regurgitation. AI VMax is 4.49 m/s. AI pressure half time is 602 msec. AI slope is 2.18 m/s².     Mitral Valve:  Structurally normal mitral valve with normal leaflet excursion. There is mild posterior calcification of the mitral valve annulus. There is mild mitral regurgitation.     Tricuspid Valve:  Structurally normal tricuspid valve with normal leaflet excursion. There is trace tricuspid regurgitation.     Pulmonic Valve:  The pulmonic valve was not well visualized.     Aorta:  The aortic root at the sinuses of Valsalva is normal in size, measuring 2.80 cm (indexed 1.46cm/m²). The aortic arch diameter is normal in size, measuring 2.2 cm (indexed 1.15 cm/m²).     Pericardium:  There is a small pericardial effusion noted adjacent to the posterior left ventricle.     Systemic Veins:  The inferior vena cava is normal in size measuring 1.33 cm in diameter, (normal <2.1cm) with normal inspiratory collapse (normal >50%) consistent with normal right atrial pressure (~3, range 0-5mmHg).  ____________________________________________________________________  QUANTITATIVE DATA:  Left Ventricle Measurements: (Indexed to BSA)     IVSd (2D):   1.1 cm  LVPWd (2D):  1.1 cm  LVIDd (2D):  4.9 cm  LVIDs (2D):  3.7 cm  LV Mass:     209 g  109.0 g/m²  LV Vol d, MOD A2C: 130.0 ml 67.85 ml/m²  LV Vol d, MOD A4C: 128.0 ml 66.81 ml/m²  LV Vol d, MOD BP:  129.5 ml 67.60 ml/m²  LV Vol s, MOD A2C: 63.4 ml  33.09 ml/m²  LV Vol s, MOD A4C: 67.1 ml  35.02 ml/m²  LV Vol s, MOD BP:  65.3 ml  34.06 ml/m²  LVOT SV MOD BP:    64.3 ml  LV EF% MOD BP:     50 %     MV E Vmax:    0.98 m/s  MV A Vmax:    0.39 m/s  MV E/A:       2.51  e' lateral:   9.14 cm/s  e' medial:    7.18 cm/s  E/e' lateral: 10.78  E/e' medial:  13.72  E/e' Average: 12.07  MV DT:        208 msec    Aorta Measurements: (normal range) (Indexed to BSA)     Sinuses of Valsalva: 2.80 cm (3.1 - 3.7 cm)  Ao Arch:             2.2 cm       Left Atrium Measurements: (Indexed to BSA)  LA Diam 2D: 3.50 cm    Right Atrial Measurements:     RA Vol:       45.30 ml  RA Vol Index: 23.64 ml/m²       LVOT / RVOT/ Qp/Qs Data: (Indexedto BSA)  LVOT Diameter: 2.10 cm    Aortic Valve Measurements:  AR Vmax  4.49 m/s  AR PHT   602 msec  AR [place] 2.18 m/s²    Mitral Valve Measurements:     MV E Vmax: 1.0 m/s         MR Vmax:          5.11 m/s  MV A Vmax: 0.4 m/s         MR Peak Gradient: 104.4 mmHg  MV E/A:    2.5       Tricuspid Valve Measurements:     RA Pressure: 3 mmHg    ________________________________________________________________________________________  Electronically signed on 12/21/2023 at 5:11:05 PM by Maggie Colin MD         *** Final ***      [facility] -- Roxi Pardo Pannella, Patel, Savella, Goodger, Cohen  Office # [phone]    Follow Up:  AF    Subjective/Observations: seen and examined, awake, alert, resting comfortably in bed, denies chest pain, dyspnea, palpitations or dizziness, orthopnea and PND. Tolerating room air.no events overnight     REVIEW OF SYSTEMS: All other review of systems is negative unless indicated above  PAST MEDICAL & SURGICAL HISTORY:  Essential hypertension      Gastroesophageal reflux disease, esophagitis presence not specified      HTN (hypertension)      Hyperlipidemia      CAD (coronary artery disease)  stents x 2      CAD S/P percutaneous coronary angioplasty  with ANAHI        MEDICATIONS  (STANDING):  amLODIPine   Tablet 10 milliGRAM(s) Oral daily  aspirin enteric coated 81 milliGRAM(s) Oral daily  atenolol  Tablet 50 milliGRAM(s) Oral two times a day  atorvastatin 40 milliGRAM(s) Oral at bedtime  folic acid 1 milliGRAM(s) Oral daily  furosemide    Tablet 40 milliGRAM(s) Oral daily  hydrALAZINE 25 milliGRAM(s) Oral three times a day  influenza  Vaccine (HIGH DOSE) 0.7 milliLiter(s) IntraMuscular once  mupirocin 2% Nasal 1 Application(s) Both Nostrils two times a day  rivaroxaban 15 milliGRAM(s) Oral with dinner  tamsulosin 0.4 milliGRAM(s) Oral at bedtime    MEDICATIONS  (PRN):    Allergies    No Known Allergies    Intolerances      Vital Signs Last 24 Hrs  T(C): 36.3 (24 Dec 2023 11:21), Max: 36.5 (23 Dec 2023 22:35)  T(F): 97.4 (24 Dec 2023 11:21), Max: 97.7 (23 Dec 2023 22:35)  HR: 85 (24 Dec 2023 11:21) (68 - 85)  BP: 124/77 (24 Dec 2023 11:21) (113/72 - 141/61)  BP(mean): --  RR: 18 (24 Dec 2023 11:21) (17 - 20)  SpO2: 95% (24 Dec 2023 11:21) (91% - 98%)    Parameters below as of 24 Dec 2023 04:45  Patient On (Oxygen Delivery Method): room air      I&O's Summary        TELE: Af 80  PHYSICAL EXAM:  Constitutional: NAD, awake and alert  HEENT: Moist Mucous Membranes, Anicteric  Pulmonary: Non-labored, breath sounds are clear bilaterally, No wheezing, rales or rhonchi  Cardiovascular: IRRR, S1 and S2, No murmurs, rubs, gallops or clicks  Gastrointestinal: Bowel Sounds present, soft, nontender.   Lymph: No peripheral edema. No lymphadenopathy.  Skin: No visible rashes or ulcers.  Psych:  Mood & affect appropriate  LABS: All Labs Reviewed:                        13.6   9.89  )-----------( 234      ( 23 Dec 2023 06:35 )             42.0                         13.8   12.48 )-----------( 241      ( 22 Dec 2023 05:30 )             43.1     23 Dec 2023 06:35    140    |  108    |  36     ----------------------------<  97     3.6     |  25     |  1.80   22 Dec 2023 05:30    140    |  109    |  33     ----------------------------<  113    3.9     |  24     |  1.80     Ca    8.8        23 Dec 2023 06:35  Ca    8.4        22 Dec 2023 05:30  Phos  2.3       23 Dec 2023 06:35  Phos  2.6       22 Dec 2023 05:30  Mg     2.3       23 Dec 2023 06:35  Mg     2.3       22 Dec 2023 05:30    TPro  7.1    /  Alb  3.1    /  TBili  0.8    /  DBili  x      /  AST  23     /  ALT  34     /  AlkPhos  97     23 Dec 2023 06:35  TPro  7.0    /  Alb  3.2    /  TBili  1.3    /  DBili  x      /  AST  19     /  ALT  27     /  AlkPhos  90     22 Dec 2023 05:30          12 Lead ECG:   Ventricular Rate 71 BPM    Atrial Rate 71 BPM    P-R Interval 180 ms    QRS Duration 106 ms    Q-T Interval 432 ms    QTC Calculation(Bazett) 469 ms    P Axis 50 degrees    R Axis -31 degrees    T Axis 66 degrees    Diagnosis Line Normal sinus rhythm  Left axis deviation  Confirmed by KALYN TAN ([id number]) on 12/21/2023 1:13:05 PM (12-21-23 @ 03:17)      TRANSTHORACIC ECHOCARDIOGRAM REPORT  ________________________________________________________________________________                                      _______       Pt. Name:       TRUDI COOLEY Study Date:    12/21/2023  MRN:            [id number]      YOB: 1950  Accession #:    [id number]     Age:           73 years  Account#:       [id number]    Gender:        M  Heart Rate:                   Height:        64.17 in (163.00 cm)  Rhythm:                       Weight:        189.59 lb (86.00 kg)  Blood Pressure: 136/61 mmHg   BSA/BMI:       1.92 m² / 32.37 kg/m²  ________________________________________________________________________________________  Referring Physician: [id number] Claudy Real  Primary Sonographer: Yessica Rdz CS    CPT:               ECHO TTE WO CON COMP W DOPP - 20676.m  Indication(s):     Dyspnea, unspecified - R06.00  Procedure:         Transthoracic echocardiogram with 2-D, M-mode and complete                     spectral and color flow Doppler.  Ordering Location: [facility]  Study Information: Image quality for this study is technically difficult.    _______________________________________________________________________________________     CONCLUSIONS:      1. Technically difficult image quality.   2. Left ventricular systolic function is low-normal with a LVEF estimated at 50-55%.   3. Normal right ventricular cavity size, wall thickness, and systolic function.   4. Mild mitral regurgitation.   5. Mild aortic regurgitation.   6. Tracetricuspid regurgitation.   7. Small pericardial effusion noted adjacent to the posterior left ventricle associated with a large anterior fat pad, without diastolic RV collapse.   8. The inferior vena cava is normal in size measuring 1.33 cm in diameter, (normal <2.1cm) with normal inspiratory collapse (normal >50%) consistent with normal right atrial pressure (~3, range 0-5mmHg).    ________________________________________________________________________________________  FINDINGS:     Left Ventricle:  Left ventricular systolic function is low normal with a calculated ejection fraction of 50-55% by the Burns's biplane method of disks. The left ventricular diastolic function is indeterminate.     Right Ventricle:  The right ventricular cavityis normal in size, normal wall thickness and normal systolic function.     Left Atrium:  The left atrium is normal.     Right Atrium:  The right atrium is normal in size with an indexed volume of 23.64 ml/m².     Aortic Valve:  The aortic valve anatomy cannot be determined with normal systolic excursion. There is moderate calcification of the aortic valve leaflets. There is no aortic valve stenosis. There is mild aortic regurgitation. AI VMax is 4.49 m/s. AI pressure half time is 602 msec. AI slope is 2.18 m/s².     Mitral Valve:  Structurally normal mitral valve with normal leaflet excursion. There is mild posterior calcification of the mitral valve annulus. There is mild mitral regurgitation.     Tricuspid Valve:  Structurally normal tricuspid valve with normal leaflet excursion. There is trace tricuspid regurgitation.     Pulmonic Valve:  The pulmonic valve was not well visualized.     Aorta:  The aortic root at the sinuses of Valsalva is normal in size, measuring 2.80 cm (indexed 1.46cm/m²). The aortic arch diameter is normal in size, measuring 2.2 cm (indexed 1.15 cm/m²).     Pericardium:  There is a small pericardial effusion noted adjacent to the posterior left ventricle.     Systemic Veins:  The inferior vena cava is normal in size measuring 1.33 cm in diameter, (normal <2.1cm) with normal inspiratory collapse (normal >50%) consistent with normal right atrial pressure (~3, range 0-5mmHg).  ____________________________________________________________________  QUANTITATIVE DATA:  Left Ventricle Measurements: (Indexed to BSA)     IVSd (2D):   1.1 cm  LVPWd (2D):  1.1 cm  LVIDd (2D):  4.9 cm  LVIDs (2D):  3.7 cm  LV Mass:     209 g  109.0 g/m²  LV Vol d, MOD A2C: 130.0 ml 67.85 ml/m²  LV Vol d, MOD A4C: 128.0 ml 66.81 ml/m²  LV Vol d, MOD BP:  129.5 ml 67.60 ml/m²  LV Vol s, MOD A2C: 63.4 ml  33.09 ml/m²  LV Vol s, MOD A4C: 67.1 ml  35.02 ml/m²  LV Vol s, MOD BP:  65.3 ml  34.06 ml/m²  LVOT SV MOD BP:    64.3 ml  LV EF% MOD BP:     50 %     MV E Vmax:    0.98 m/s  MV A Vmax:    0.39 m/s  MV E/A:       2.51  e' lateral:   9.14 cm/s  e' medial:    7.18 cm/s  E/e' lateral: 10.78  E/e' medial:  13.72  E/e' Average: 12.07  MV DT:        208 msec    Aorta Measurements: (normal range) (Indexed to BSA)     Sinuses of Valsalva: 2.80 cm (3.1 - 3.7 cm)  Ao Arch:             2.2 cm       Left Atrium Measurements: (Indexed to BSA)  LA Diam 2D: 3.50 cm    Right Atrial Measurements:     RA Vol:       45.30 ml  RA Vol Index: 23.64 ml/m²       LVOT / RVOT/ Qp/Qs Data: (Indexedto BSA)  LVOT Diameter: 2.10 cm    Aortic Valve Measurements:  AR Vmax  4.49 m/s  AR PHT   602 msec  AR [place] 2.18 m/s²    Mitral Valve Measurements:     MV E Vmax: 1.0 m/s         MR Vmax:          5.11 m/s  MV A Vmax: 0.4 m/s         MR Peak Gradient: 104.4 mmHg  MV E/A:    2.5       Tricuspid Valve Measurements:     RA Pressure: 3 mmHg    ________________________________________________________________________________________  Electronically signed on 12/21/2023 at 5:11:05 PM by Maggie Colin MD         *** Final ***

## 2023-12-24 NOTE — PROGRESS NOTE ADULT - SUBJECTIVE AND OBJECTIVE BOX
Date/Time Patient Seen:  		  Referring MD:   Data Reviewed	       Patient is a 73y old  Male who presents with a chief complaint of resp fail (23 Dec 2023 16:40)      Subjective/HPI     PAST MEDICAL & SURGICAL HISTORY:  Essential hypertension    Gastroesophageal reflux disease, esophagitis presence not specified    HTN (hypertension)    Hyperlipidemia    CAD (coronary artery disease)  stents x 2    No significant past surgical history    No significant past surgical history    CAD S/P percutaneous coronary angioplasty  with ANAHI          Medication list         MEDICATIONS  (STANDING):  amLODIPine   Tablet 10 milliGRAM(s) Oral daily  aspirin enteric coated 81 milliGRAM(s) Oral daily  atenolol  Tablet 50 milliGRAM(s) Oral two times a day  atorvastatin 40 milliGRAM(s) Oral at bedtime  folic acid 1 milliGRAM(s) Oral daily  furosemide    Tablet 40 milliGRAM(s) Oral daily  hydrALAZINE 25 milliGRAM(s) Oral three times a day  influenza  Vaccine (HIGH DOSE) 0.7 milliLiter(s) IntraMuscular once  mupirocin 2% Nasal 1 Application(s) Both Nostrils two times a day  rivaroxaban 15 milliGRAM(s) Oral with dinner  tamsulosin 0.4 milliGRAM(s) Oral at bedtime    MEDICATIONS  (PRN):         Vitals log        ICU Vital Signs Last 24 Hrs  T(C): 36.4 (24 Dec 2023 04:45), Max: 36.5 (23 Dec 2023 22:35)  T(F): 97.5 (24 Dec 2023 04:45), Max: 97.7 (23 Dec 2023 22:35)  HR: 68 (24 Dec 2023 04:45) (68 - 75)  BP: 129/82 (24 Dec 2023 04:45) (113/72 - 141/61)  BP(mean): --  ABP: --  ABP(mean): --  RR: 17 (24 Dec 2023 04:45) (17 - 20)  SpO2: 95% (24 Dec 2023 04:45) (91% - 98%)    O2 Parameters below as of 24 Dec 2023 04:45  Patient On (Oxygen Delivery Method): room air                 Input and Output:  I&O's Detail    22 Dec 2023 07:01  -  23 Dec 2023 07:00  --------------------------------------------------------  IN:  Total IN: 0 mL    OUT:    Voided (mL): 600 mL  Total OUT: 600 mL    Total NET: -600 mL          Lab Data                        13.6   9.89  )-----------( 234      ( 23 Dec 2023 06:35 )             42.0     12-23    140  |  108  |  36<H>  ----------------------------<  97  3.6   |  25  |  1.80<H>    Ca    8.8      23 Dec 2023 06:35  Phos  2.3     12-23  Mg     2.3     12-23    TPro  7.1  /  Alb  3.1<L>  /  TBili  0.8  /  DBili  x   /  AST  23  /  ALT  34  /  AlkPhos  97  12-23            Review of Systems	      Objective     Physical Examination    heart s1s2  lung dc BS  head nc      Pertinent Lab findings & Imaging      Molly:  NO   Adequate UO     I&O's Detail    22 Dec 2023 07:01  -  23 Dec 2023 07:00  --------------------------------------------------------  IN:  Total IN: 0 mL    OUT:    Voided (mL): 600 mL  Total OUT: 600 mL    Total NET: -600 mL               Discussed with:     Cultures:	        Radiology

## 2023-12-24 NOTE — PROGRESS NOTE ADULT - ASSESSMENT
73 M history CAD with stents, afib, hypertension, hyperlipidemia here complaining of heavy breathing. patient reports yesterday he had a mild cough then about 45 minutes prior to arrival patient developed difficulty breathing.    resp distress  eval for Pulm Edema  HTN  AF  CAD  HF eval  CKD  BMI > 30    ct chest - pulm edema - small eff -   diuresis in progress    doubt PNA  cxr more consistent with Pulm Edema and CHF  Diuresis in progress  BP control  I and O  serial renal indices  check Biomarkers - Cx -   HANK eval as outpatient  cvs rx regimen optimization  cardio eval  NIV use and wean as tolerated  spoke with WIFE  dietary discretion for CAD CHF  follows with Dr Lomas in the office for Cardio  TTE - grossly normal

## 2023-12-24 NOTE — PROGRESS NOTE ADULT - ASSESSMENT
73yoM with a PMH of CAD sp PCI/ANAHI x 2 to LAD/[place] 2016, MI 2016, HTN, HLD, AF on AC, CKD, stress echo 2018 EF 59% who presents to ED BIBEMS with SOB, JORDAN and congestion which has worsened in last day.    JORDAN  - no sign of acute ischemia and troponin are negative  - no anginal complaints  - continue ASA, statin     - CXR significant for b/l infiltrates --> PNA vs pulmonary edema   - TTE 2018: EF 59%  - TTE (12/21/23) LVSF low normal with EF 50-55% Mild mitral regurgitation, AR, Small pericardial effusion noted adjacent to the posterior left ventricle associated with a large anterior fat pad, without diastolic RV collapse.  - s/p IV Lasix   - better compensated from HF POV  - continue Lasix 40 mg PO daily    - known Afib on Xarelto  - BP, HR stable and controlled per flow sheet and tele   - cont hydralazine, amlodipine, atenolol with hold parameters  - continue Xarelto   - Monitor and replete Lytes. Keep K > 4 and Mg > 2    - DC planning per primary   - will follow up with Dr. Gillespie post discharge   - Will continue to follow.    Maude Newton [facility]  Nurse Practitioner - Cardiology   call TEAMS     73yoM with a PMH of CAD sp PCI/ANAHI x 2 to LAD/[place] 2016, MI 2016, HTN, HLD, AF on AC, CKD, stress echo 2018 EF 59% who presents to ED BIBEMS with SOB, JORDAN and congestion which has worsened in last day.    JORDAN  - no sign of acute ischemia and troponin are negative  - no anginal complaints  - continue ASA, statin     - CXR significant for b/l infiltrates --> PNA vs pulmonary edema   - TTE 2018: EF 59%  - TTE (12/21/23) LVSF low normal with EF 50-55% Mild mitral regurgitation, AR, Small pericardial effusion noted adjacent to the posterior left ventricle associated with a large anterior fat pad, without diastolic RV collapse.  - s/p IV Lasix   - better compensated from HF POV  - continue Lasix 40 mg PO daily    - known Afib on Xarelto  - BP, HR stable and controlled per flow sheet and tele   - cont hydralazine, amlodipine, atenolol with hold parameters  - continue Xarelto   - Monitor and replete Lytes. Keep K > 4 and Mg > 2    - DC planning per primary   - will follow up with Dr. Gillespie post discharge   - Will continue to follow.    Maude Newton [facility]  Nurse Practitioner - Cardiology   call TEAMS

## 2023-12-24 NOTE — PROGRESS NOTE ADULT - SUBJECTIVE AND OBJECTIVE BOX
Date of Service: 12-24-23 @ 11:07    Patient is a 73y old  Male who presents with a chief complaint of resp fail (24 Dec 2023 09:38)      INTERVAL HPI/OVERNIGHT EVENTS: Patient seen and examined. NAD. No complaints.    Vital Signs Last 24 Hrs  T(C): 36.4 (24 Dec 2023 04:45), Max: 36.5 (23 Dec 2023 22:35)  T(F): 97.5 (24 Dec 2023 04:45), Max: 97.7 (23 Dec 2023 22:35)  HR: 68 (24 Dec 2023 04:45) (68 - 75)  BP: 129/82 (24 Dec 2023 04:45) (113/72 - 141/61)  BP(mean): --  RR: 17 (24 Dec 2023 04:45) (17 - 20)  SpO2: 95% (24 Dec 2023 04:45) (91% - 98%)    Parameters below as of 24 Dec 2023 04:45  Patient On (Oxygen Delivery Method): room air        12-23    140  |  108  |  36<H>  ----------------------------<  97  3.6   |  25  |  1.80<H>    Ca    8.8      23 Dec 2023 06:35  Phos  2.3     12-23  Mg     2.3     12-23    TPro  7.1  /  Alb  3.1<L>  /  TBili  0.8  /  DBili  x   /  AST  23  /  ALT  34  /  AlkPhos  97  12-23                          13.6   9.89  )-----------( 234      ( 23 Dec 2023 06:35 )             42.0       CAPILLARY BLOOD GLUCOSE        Urinalysis Basic - ( 23 Dec 2023 06:35 )    Color: x / Appearance: x / SG: x / pH: x  Gluc: 97 mg/dL / Ketone: x  / Bili: x / Urobili: x   Blood: x / Protein: x / Nitrite: x   Leuk Esterase: x / RBC: x / WBC x   Sq Epi: x / Non Sq Epi: x / Bacteria: x              amLODIPine   Tablet 10 milliGRAM(s) Oral daily  aspirin enteric coated 81 milliGRAM(s) Oral daily  atenolol  Tablet 50 milliGRAM(s) Oral two times a day  atorvastatin 40 milliGRAM(s) Oral at bedtime  folic acid 1 milliGRAM(s) Oral daily  furosemide    Tablet 40 milliGRAM(s) Oral daily  hydrALAZINE 25 milliGRAM(s) Oral three times a day  influenza  Vaccine (HIGH DOSE) 0.7 milliLiter(s) IntraMuscular once  mupirocin 2% Nasal 1 Application(s) Both Nostrils two times a day  rivaroxaban 15 milliGRAM(s) Oral with dinner  tamsulosin 0.4 milliGRAM(s) Oral at bedtime              REVIEW OF SYSTEMS:  CONSTITUTIONAL: No fever, no weight loss, or no fatigue  NECK: No pain, no stiffness  RESPIRATORY: No cough, no wheezing, no chills, no hemoptysis, No shortness of breath  CARDIOVASCULAR: No chest pain, no palpitations, no dizziness, no leg swelling  GASTROINTESTINAL: No abdominal pain. No nausea, no vomiting, no hematemesis; No diarrhea, no constipation. No melena, no hematochezia.  GENITOURINARY: No dysuria, no frequency, no hematuria, no incontinence  NEUROLOGICAL: No headaches, no loss of strength, no numbness, no tremors  SKIN: No itching, no burning  MUSCULOSKELETAL: No joint pain, no swelling; No muscle, no back, no extremity pain  PSYCHIATRIC: No depression, no mood swings,   HEME/LYMPH: No easy bruising, no bleeding gums  ALLERY AND IMMUNOLOGIC: No hives       Consultant(s) Notes Reviewed:  [X] YES  [ ] NO    PHYSICAL EXAM:  GENERAL: NAD  HEAD:  Atraumatic, Normocephalic  EYES: EOMI, PERRLA, conjunctiva and sclera clear  ENMT: No tonsillar erythema, exudates, or enlargement; Moist mucous membranes  NECK: Supple, No JVD  NERVOUS SYSTEM:  Awake & alert  CHEST/LUNG: Clear to auscultation bilaterally; No rales, rhonchi, wheezing,  HEART: Regular rate and rhythm  ABDOMEN: Soft, Nontender, Nondistended; Bowel sounds present  EXTREMITIES:  No clubbing, cyanosis, or edema  LYMPH: No lymphadenopathy noted  SKIN: No rashes      Advanced care planning discussed with patient/family [X] YES   [ ] NO    Advanced care planning discussed with patient/family. Patient's health status was discussed. All appropriate changes have been made regarding patient's end-of-life care. Advanced care planning forms reviewed/discussed/completed.  20 minutes spent.

## 2023-12-24 NOTE — PROGRESS NOTE ADULT - PROVIDER SPECIALTY LIST ADULT
Cardiology
Critical Care
Family Medicine
Nephrology
Pulmonology
Pulmonology
Infectious Disease
Cardiology
Cardiology
Family Medicine
Infectious Disease
Nephrology
Critical Care
Nephrology
Pulmonology
Internal Medicine

## 2023-12-24 NOTE — DISCHARGE NOTE NURSING/CASE MANAGEMENT/SOCIAL WORK - PATIENT PORTAL LINK FT
You can access the FollowMyHealth Patient Portal offered by [facility] by registering at the following website: [email or web address]. By joining [employer]’s FollowMyHealth portal, you will also be able to view your health information using other applications (apps) compatible with our system. You can access the FollowMyHealth Patient Portal offered by [facility] by registering at the following website: [email or web address]. By joining [employer]’s FollowMyHealth portal, you will also be able to view your health information using other applications (apps) compatible with our system.

## 2023-12-24 NOTE — PATIENT CHOICE NOTE. - NSPTCHOICESTATE_GEN_ALL_CORE
I have met with the patient and/or caregiver to discuss discharge goals and treatment plan. Patient and/or caregiver also provided with instructions on accessing the CMS Compare websites for additional information related to Post Acute Provider quality and resource use measures to assist them in evaluation of the providers and in selecting their post-acute provider of choice. Patient and caregiver were informed of the facilities that are owned and/or operated by [facility]. I have discussed with the patient the availability of in-network facilities and providers. Patient and caregiver provided with a list of post-acute providers whose services are appropriate to the discharge plans and patient needs.     For patient requiring durable medical equipment, patient and/or caregiver were informed that they have the right to request who provides the required equipment. I have met with the patient and/or caregiver to discuss discharge goals and treatment plan. Patient and/or caregiver also provided with instructions on accessing the CMS Compare websites for additional information related to Post Acute Provider quality and resource use measures to assist them in evaluation of the providers and in selecting their post-acute provider of choice. Patient and caregiver were informed of the facilities that are owned and/or operated by [facility]. I have discussed with the patient the availability of in-network facilities and providers. Patient and caregiver provided with a list of post-acute providers whose services are appropriate to the discharge plans and patient needs.     For patient requiring durable medical equipment, patient and/or caregiver were informed that they have the right to request who provides the required equipment.

## 2023-12-24 NOTE — DISCHARGE NOTE NURSING/CASE MANAGEMENT/SOCIAL WORK - NSDCVIVACCINE_GEN_ALL_CORE_FT
Tdap; 12-Jan-2017 22:28; Jonny Gonsalves (ANA); [employer]; [id number]; IntraMuscular; Deltoid Right.; 0.5 milliLiter(s); VIS (VIS Published: 09-May-2013, VIS Presented: 12-Jan-2017);    Tdap; 12-Jan-2017 22:28; Jonny Gonsalves (ANA); [employer]; [id number]; IntraMuscular; Deltoid Right.; 0.5 milliLiter(s); VIS (VIS Published: 09-May-2013, VIS Presented: 12-Jan-2017);

## 2023-12-24 NOTE — PROGRESS NOTE ADULT - REASON FOR ADMISSION
See Coumadin tracking flowsheet for documentation.  
resp fail
respiratory fail
resp fail

## 2023-12-24 NOTE — CASE MANAGEMENT PROGRESS NOTE - NSCMPROGRESSNOTE_GEN_ALL_CORE
Patient has been medically cleared for discharge home today. 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 primary insurance. CM met with the patient at the bedside to discuss transition planning. CM discussed home care services for a visiting nurse. Home care choice list provided. Patient has declined home care services to be arranged and stated he is independent with medication management and outpatient follow up. Patient stated his son will drive him home today and declined for CM to speak to son or spouse. Patient verbalized understanding of the transition plan and is in agreement. CM remains available.    Patient has been medically cleared for discharge home today. 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 CM met with the patient at the bedside to discuss transition planning. CM discussed home care services for a visiting nurse. Home care choice list provided. Patient has declined home care services to be arranged and stated he is independent with medication management and outpatient follow up. Patient stated his son will drive him home today and declined for CM to speak to son or spouse. Patient verbalized understanding of the transition plan and is in agreement. CM remains available.

## 2023-12-24 NOTE — PROGRESS NOTE ADULT - PROBLEM SELECTOR PLAN 3
S?P IV ABX- rocephin  ID eval with dr. freedom bhatti
IV ABX- rocephin  ID eval with dr. loja
S?P IV ABX- rocephin  ID eval with dr. freedom bhatti

## 2023-12-24 NOTE — PROGRESS NOTE ADULT - PROBLEM SELECTOR PLAN 2
S/P bipap per intensivist  pulmelly warner with Dr. pereira appreciated  improving
bipap per intensivist  james warner with Dr. pereira
S/P bipap per intensivist  pulmelly warner with Dr. pereira appreciated  improving

## 2023-12-24 NOTE — DISCHARGE NOTE NURSING/CASE MANAGEMENT/SOCIAL WORK - CAREGIVER RELATION TO PATIENT
09 King Street, [address], phone #[phone].    
43 Gonzalez Street, [address], phone #[phone].  -Please take a stool softener (senna, colace, or miralax) while taking narcotic pain medication to avoid opioid-induced constipation.    
DISPLAY PLAN FREE TEXT
spouse
53 Gordon Street, [facility], phone #[phone].  -Please take a stool softener (senna, colace, or miralax) while taking narcotic pain medication to avoid opioid-induced constipation.

## 2023-12-24 NOTE — PROGRESS NOTE ADULT - SUBJECTIVE AND OBJECTIVE BOX
Patient is a 73y old  Male who presents with a chief complaint of resp fail (21 Dec 2023 14:01)       HPI: 72 Y/O CAD with stents, afib, hypertension, hyperlipidemia here complaining of heavy breathing. patient reports yesterday he had a mild cough then about 45 minutes prior to arrival patient developed difficulty breathing.  Has not seen nephrologist in the past.  States he is urinating.  NO N/V.  SOB improving.       PAST MEDICAL & SURGICAL HISTORY:  Essential hypertension      Gastroesophageal reflux disease, esophagitis presence not specified      HTN (hypertension)      Hyperlipidemia      CAD (coronary artery disease)  stents x 2      CAD S/P percutaneous coronary angioplasty  with ANAHI           FAMILY HISTORY:  FH: heart disease  father and mother     [place]    Social History:Non smoker    MEDICATIONS  (STANDING):  amLODIPine   Tablet 10 milliGRAM(s) Oral daily  aspirin enteric coated 81 milliGRAM(s) Oral daily  atenolol  Tablet 50 milliGRAM(s) Oral daily  atorvastatin 20 milliGRAM(s) Oral at bedtime  folic acid 1 milliGRAM(s) Oral daily  furosemide   Injectable 80 milliGRAM(s) IV Push daily  hydrALAZINE 25 milliGRAM(s) Oral three times a day  influenza  Vaccine (HIGH DOSE) 0.7 milliLiter(s) IntraMuscular once  nitroglycerin  Infusion 5 MICROgram(s)/Min (1.5 mL/Hr) IV Continuous <Continuous>  rivaroxaban 15 milliGRAM(s) Oral with dinner    MEDICATIONS  (PRN):   Meds reviewed    Allergies    No Known Allergies    Intolerances         REVIEW OF SYSTEMS:    as above    Vital Signs Last 24 Hrs  T(C): 36.4 (24 Dec 2023 04:45), Max: 36.5 (23 Dec 2023 22:35)  T(F): 97.5 (24 Dec 2023 04:45), Max: 97.7 (23 Dec 2023 22:35)  HR: 68 (24 Dec 2023 04:45) (68 - 75)  BP: 129/82 (24 Dec 2023 04:45) (113/72 - 141/61)  BP(mean): --  RR: 17 (24 Dec 2023 04:45) (17 - 20)  SpO2: 95% (24 Dec 2023 04:45) (91% - 98%)    Parameters below as of 24 Dec 2023 04:45  Patient On (Oxygen Delivery Method): room air        PHYSICAL EXAM:    GENERAL: NAD  HEAD:  Atraumatic, Normocephalic  EYES: EOMI, conjunctiva and sclera clear  ENMT: No Drainage from nares, No drainage from ears  NERVOUS SYSTEM:  Awake and Alert  CHEST/LUNG: + rales  EXTREMITIES:  No Edema  SKIN: No rashes No obvious ecchymosis      LABS:                        13.6   9.89  )-----------( 234      ( 23 Dec 2023 06:35 )             42.0         140  |  108  |  36<H>  ----------------------------<  97  3.6   |  25  |  1.80<H>    Ca    8.8      23 Dec 2023 06:35  Phos  2.3       Mg     2.3         TPro  7.1  /  Alb  3.1<L>  /  TBili  0.8  /  DBili  x   /  AST  23  /  ALT  34  /  AlkPhos  97  12-      Urinalysis Basic - ( 23 Dec 2023 06:35 )    Color: x / Appearance: x / SG: x / pH: x  Gluc: 97 mg/dL / Ketone: x  / Bili: x / Urobili: x   Blood: x / Protein: x / Nitrite: x   Leuk Esterase: x / RBC: x / WBC x   Sq Epi: x / Non Sq Epi: x / Bacteria: x         Patient is a 73y old  Male who presents with a chief complaint of resp fail (21 Dec 2023 14:01)       HPI: 74 Y/O CAD with stents, afib, hypertension, hyperlipidemia here complaining of heavy breathing. patient reports yesterday he had a mild cough then about 45 minutes prior to arrival patient developed difficulty breathing.  Has not seen nephrologist in the past.  States he is urinating.  NO N/V.  SOB improving.       PAST MEDICAL & SURGICAL HISTORY:  Essential hypertension      Gastroesophageal reflux disease, esophagitis presence not specified      HTN (hypertension)      Hyperlipidemia      CAD (coronary artery disease)  stents x 2      CAD S/P percutaneous coronary angioplasty  with ANAHI           FAMILY HISTORY:  FH: heart disease  father and mother     [place]    Social History:Non smoker    MEDICATIONS  (STANDING):  amLODIPine   Tablet 10 milliGRAM(s) Oral daily  aspirin enteric coated 81 milliGRAM(s) Oral daily  atenolol  Tablet 50 milliGRAM(s) Oral daily  atorvastatin 20 milliGRAM(s) Oral at bedtime  folic acid 1 milliGRAM(s) Oral daily  furosemide   Injectable 80 milliGRAM(s) IV Push daily  hydrALAZINE 25 milliGRAM(s) Oral three times a day  influenza  Vaccine (HIGH DOSE) 0.7 milliLiter(s) IntraMuscular once  nitroglycerin  Infusion 5 MICROgram(s)/Min (1.5 mL/Hr) IV Continuous <Continuous>  rivaroxaban 15 milliGRAM(s) Oral with dinner    MEDICATIONS  (PRN):   Meds reviewed    Allergies    No Known Allergies    Intolerances         REVIEW OF SYSTEMS:    as above    Vital Signs Last 24 Hrs  T(C): 36.4 (24 Dec 2023 04:45), Max: 36.5 (23 Dec 2023 22:35)  T(F): 97.5 (24 Dec 2023 04:45), Max: 97.7 (23 Dec 2023 22:35)  HR: 68 (24 Dec 2023 04:45) (68 - 75)  BP: 129/82 (24 Dec 2023 04:45) (113/72 - 141/61)  BP(mean): --  RR: 17 (24 Dec 2023 04:45) (17 - 20)  SpO2: 95% (24 Dec 2023 04:45) (91% - 98%)    Parameters below as of 24 Dec 2023 04:45  Patient On (Oxygen Delivery Method): room air        PHYSICAL EXAM:    GENERAL: NAD  HEAD:  Atraumatic, Normocephalic  EYES: EOMI, conjunctiva and sclera clear  ENMT: No Drainage from nares, No drainage from ears  NERVOUS SYSTEM:  Awake and Alert  CHEST/LUNG: + rales  EXTREMITIES:  No Edema  SKIN: No rashes No obvious ecchymosis      LABS:                        13.6   9.89  )-----------( 234      ( 23 Dec 2023 06:35 )             42.0         140  |  108  |  36<H>  ----------------------------<  97  3.6   |  25  |  1.80<H>    Ca    8.8      23 Dec 2023 06:35  Phos  2.3       Mg     2.3         TPro  7.1  /  Alb  3.1<L>  /  TBili  0.8  /  DBili  x   /  AST  23  /  ALT  34  /  AlkPhos  97  12-      Urinalysis Basic - ( 23 Dec 2023 06:35 )    Color: x / Appearance: x / SG: x / pH: x  Gluc: 97 mg/dL / Ketone: x  / Bili: x / Urobili: x   Blood: x / Protein: x / Nitrite: x   Leuk Esterase: x / RBC: x / WBC x   Sq Epi: x / Non Sq Epi: x / Bacteria: x

## 2023-12-24 NOTE — PROGRESS NOTE ADULT - ASSESSMENT
LEOBARDO on CKD 3  Metabolic Acidosis  HTN Urgency  Dyspnea  CHF    -Baseline Creatinine 1.5  -LEOBARDO likely 2/2 to hypoxic injury; now plateau and stable   -Will need to tolerate higher creatinine  to cont lasix to achieve euvolemia. Now on oral lasix   -No emergent indication for RRT  -No indication for bicarb at this time and will avoid salt load  -Diuretic will also help improve BP  -Creatinine improving

## 2023-12-24 NOTE — DISCHARGE NOTE NURSING/CASE MANAGEMENT/SOCIAL WORK - NSDCPEFALRISK_GEN_ALL_CORE
For information on Fall & Injury Prevention, visit: [email or web address] OR  [email or web address] OR  https://www.cdc.gov/steadi/patient.html For information on Fall & Injury Prevention, visit: [email or web address] OR  [email or web address] OR  https://www.cdc.gov/steadi/patient.html

## 2023-12-24 NOTE — PROGRESS NOTE ADULT - NS ATTEND AMEND GEN_ALL_CORE FT
73yoM with a PMH of CAD sp PCI/ANAHI x 2 to LAD/Ramus 2016, HTN, HLD, AF on AC, CKD, stress echo 2018 EF 59% who presents to ED BIBEMS with SOB, JORDAN and congestion    - initially with adhf and hypertensive, now improved  - changed IV lasix over to 40 mg po daily  - remains in af, with good rate control  - cont current rate control medications and anti hypertensives  - has been titrated off 02, and now on RA  - cont ac  - if o2 sat ok, can dc home  - plan for fu with Dr. Gillespie.
[age]yoM with a PMH of CAD sp PCI/ANAHI x 2 to LAD/Ramus 2016, HTN, HLD, AF on AC, CKD, stress echo 2018 EF 59% who presents to ED BIBEMS with SOB, JORDAN and congestion    - initially with adhf and hypertensive, now improved  - can change IV lasix over to 40 mg po daily  - remains in af, with good rate control  - cont current rate control medications and anti hypertensives  - has been titrated off 02, and now on RA  - cont ac  - plan for fu with Dr. Gillespie

## 2023-12-26 LAB
CULTURE RESULTS: SIGNIFICANT CHANGE UP
SPECIMEN SOURCE: SIGNIFICANT CHANGE UP

## 2024-01-02 ENCOUNTER — APPOINTMENT (OUTPATIENT)
Dept: CARDIOLOGY | Facility: CLINIC | Age: 74
End: 2024-01-02
Payer: COMMERCIAL

## 2024-01-02 ENCOUNTER — NON-APPOINTMENT (OUTPATIENT)
Age: 74
End: 2024-01-02

## 2024-01-02 VITALS — HEART RATE: 82 BPM | OXYGEN SATURATION: 99 % | BODY MASS INDEX: 31.24 KG/M2 | HEIGHT: 64 IN | WEIGHT: 183 LBS

## 2024-01-02 DIAGNOSIS — I10 ESSENTIAL (PRIMARY) HYPERTENSION: ICD-10-CM

## 2024-01-02 PROCEDURE — 99215 OFFICE O/P EST HI 40 MIN: CPT

## 2024-01-02 PROCEDURE — 93000 ELECTROCARDIOGRAM COMPLETE: CPT

## 2024-01-02 RX ORDER — TAMSULOSIN HYDROCHLORIDE 0.4 MG/1
0.4 CAPSULE ORAL
Refills: 0 | Status: ACTIVE | COMMUNITY

## 2024-01-02 RX ORDER — ATORVASTATIN CALCIUM 40 MG/1
40 TABLET, FILM COATED ORAL
Qty: 90 | Refills: 3 | Status: ACTIVE | COMMUNITY
Start: 2018-04-26 | End: 1900-01-01

## 2024-01-02 RX ORDER — ATENOLOL 50 MG/1
50 TABLET ORAL TWICE DAILY
Qty: 180 | Refills: 1 | Status: DISCONTINUED | COMMUNITY
Start: 2018-04-26 | End: 2024-01-02

## 2024-01-02 RX ORDER — ISOSORBIDE MONONITRATE 60 MG/1
60 TABLET, EXTENDED RELEASE ORAL
Qty: 180 | Refills: 3 | Status: DISCONTINUED | COMMUNITY
Start: 2020-01-24 | End: 2024-01-02

## 2024-01-02 RX ORDER — AMLODIPINE BESYLATE 10 MG/1
10 TABLET ORAL
Qty: 90 | Refills: 2 | Status: DISCONTINUED | COMMUNITY
Start: 2018-04-26 | End: 2024-01-02

## 2024-01-02 RX ORDER — RIVAROXABAN 20 MG/1
20 TABLET, FILM COATED ORAL
Qty: 90 | Refills: 3 | Status: ACTIVE | COMMUNITY
Start: 2023-01-31 | End: 1900-01-01

## 2024-01-02 NOTE — DISCUSSION/SUMMARY
[EKG obtained to assist in diagnosis and management of assessed problem(s)] : EKG obtained to assist in diagnosis and management of assessed problem(s) [FreeTextEntry1] : This is a 73 year old man with a history of CAD s/p PCI to the proximal LAD and RI in January of 2016, hypertension and hyperlipidemia who presents to the office for follow up post hospitalization.  I have reviewed all of the medical records available to me at this time from his admission at length, including consultations, laboratory records, radiologic records, medication administration records and discharge summary.  He was admitted with an ADHF event in the setting of uncontrolled HTN.  He arrives today in no acute distress .  He is euvolemic on exam, compensated from a volume perspective.  His weight is down to 183.  His blood pressure is improved but now optimal.   Echocardiogram from his hospitalization demonstrated a normal LVEF without wall motion abnormalities but small pericardial effusion.  Will repeat an echo once his B/P has improved. We will try to further optimize his B/P regimen, he will switch atenolol to carvedilol 12.5mg BID.  He will continue hydralazine 25mg TID.  He can remain off amlodipine for now given his concern for LE swelling.  He will continue lasix 40mg daily for now as well.  Nuclear stress testing from 2019 demonstrated areas of infarct with mild reversible defects consistent with ischemia.  At the time he was not amendable to further testing, however , once his B/P is better controlled, will re address an ischemic workup. He will continue ASA and atorvastatin 40mg daily for goal LDL <70.  ECG illustrates atrial fibrillation, rate controlled. He will continue xarelto 20mg daily for thromboembolic prevention.  He will followup again in once month.

## 2024-01-02 NOTE — HISTORY OF PRESENT ILLNESS
[FreeTextEntry1] : This is a 73 year old man with a history of CAD s/p PCI to the proximal LAD and RI in January of 2016, hypertension and hyperlipidemia who presents to the office for follow up.  He underwent nuclear stress testing in January of 2019 and was found to have a 9 METS exercise capacity with no symptoms.  He had a large area of inferior and inferolateral infarct, along with a medium sized area of mild mid to distal anterior wall ischemia.  We discussed the options, and given that his defect was mild, he had a robust exercise tolerance, and no symptoms, we elected to manage this medically.  He has not had any further chest pains since.   He was just seen by his PMD and noted to be in atrial fibrillation.  His HR is at the upper limits of controlled.  He was given Xarelto, but did not start it until he spoke to me.    Arrives today for post hospitalization followup. He presented to [place] ED on 12/21/23, BIBEMS with SOB, JORDAN and congestion which has worsened in last day. C-xray was done and showed b/l infiltrates, thought to be PNA. CT chest also done, showec B/L infiltrates vs edema. He was started on antibiotics and given 500cc IVF. Overnight a rapid response was called for increasing SOB, hypoxia (SpO2 71% on 100% nrb) and hypertensive urgency. He was started on BiPap, given IV Lasix 80mg x1 and started on a Nitro gtt.  A bedside POCUS was done and showed +B-lines but no evidence of right heart strain. He was subsequently transferred to the ICU for suspected flash pulmonary edema.  He was discharged on hydralazine 25mg TID, atenolol 50 BID and amlodipine 10mg daily.  He arrives today feeling well. He denies chest pains or pressure. He  denies dizzy spells, feeling faint or fainting, He   denies palpitations or difficulty breathing while laying flat. He denies lower extremity swelling. He was seen by his PCP a week ago and amlodipine was discontinued due to previously complaining of ankle swelling associated.  He does not check his blood pressure at home

## 2024-01-02 NOTE — PHYSICAL EXAM
[Normal Appearance] : normal appearance [General Appearance - In No Acute Distress] : no acute distress [Normal Oral Mucosa] : normal oral mucosa [Normal Jugular Venous V Waves Present] : normal jugular venous V waves present [Respiration, Rhythm And Depth] : normal respiratory rhythm and effort [Exaggerated Use Of Accessory Muscles For Inspiration] : no accessory muscle use [Auscultation Breath Sounds / Voice Sounds] : lungs were clear to auscultation bilaterally [Bowel Sounds] : normal bowel sounds [Abdomen Soft] : soft [Abdomen Tenderness] : non-tender [Abnormal Walk] : normal gait [Gait - Sufficient For Exercise Testing] : the gait was sufficient for exercise testing [Nail Clubbing] : no clubbing of the fingernails [Cyanosis, Localized] : no localized cyanosis [Petechial Hemorrhages (___cm)] : no petechial hemorrhages [Skin Color & Pigmentation] : normal skin color and pigmentation [] : no rash [No Venous Stasis] : no venous stasis [Skin Lesions] : no skin lesions [Oriented To Time, Place, And Person] : oriented to person, place, and time [Affect] : the affect was normal [Mood] : the mood was normal [No Anxiety] : not feeling anxious [Not Palpable] : not palpable [No Precordial Heave] : no precordial heave was noted [Normal Rate] : normal [Irregularly Irregular] : irregularly irregular [2+] : left 2+ [Well Developed] : well developed [Well Nourished] : well nourished [No Acute Distress] : no acute distress [Normal Conjunctiva] : normal conjunctiva [Normal Venous Pressure] : normal venous pressure [No Carotid Bruit] : no carotid bruit [Normal S1, S2] : normal S1, S2 [No Rub] : no rub [No Gallop] : no gallop [Rhythm Regular] : regular [Normal S1] : normal S1 [Normal S2] : normal S2 [No Murmur] : no murmurs heard [No Pitting Edema] : no pitting edema present [No Abnormalities] : the abdominal aorta was not enlarged and no bruit was heard [Clear Lung Fields] : clear lung fields [Good Air Entry] : good air entry [No Respiratory Distress] : no respiratory distress  [Soft] : abdomen soft [Non Tender] : non-tender [No Masses/organomegaly] : no masses/organomegaly [Normal Bowel Sounds] : normal bowel sounds [Normal Gait] : normal gait [No Edema] : no edema [No Cyanosis] : no cyanosis [No Clubbing] : no clubbing [No Varicosities] : no varicosities [No Rash] : no rash [No Skin Lesions] : no skin lesions [Moves all extremities] : moves all extremities [No Focal Deficits] : no focal deficits [Normal Speech] : normal speech [Alert and Oriented] : alert and oriented [Normal memory] : normal memory [FreeTextEntry1] : No JVD [Bruit] : no bruit heard [Right Carotid Bruit] : no bruit heard over the right carotid [Left Carotid Bruit] : no bruit heard over the left carotid

## 2024-01-02 NOTE — REASON FOR VISIT
[Cardiac Failure] : cardiac failure [Other: ____] : [unfilled] [Follow-Up - Clinic] : a clinic follow-up of [Coronary Artery Disease] : coronary artery disease [Medication Management] : Medication management [Hyperlipidemia] : hyperlipidemia [Hypertension] : hypertension

## 2024-01-08 ENCOUNTER — TRANSCRIPTION ENCOUNTER (OUTPATIENT)
Age: 74
End: 2024-01-08

## 2024-01-08 RX ORDER — FOLIC ACID 20 MG
CAPSULE ORAL
Refills: 0 | Status: DISCONTINUED | COMMUNITY
End: 2024-01-08

## 2024-01-09 RX ORDER — FOLIC ACID 1 MG/1
1 TABLET ORAL DAILY
Qty: 90 | Refills: 3 | Status: ACTIVE | COMMUNITY
Start: 2024-01-08

## 2024-02-09 ENCOUNTER — NON-APPOINTMENT (OUTPATIENT)
Age: 74
End: 2024-02-09

## 2024-02-09 ENCOUNTER — APPOINTMENT (OUTPATIENT)
Dept: CARDIOLOGY | Facility: CLINIC | Age: 74
End: 2024-02-09
Payer: COMMERCIAL

## 2024-02-09 VITALS
OXYGEN SATURATION: 99 % | WEIGHT: 190 LBS | BODY MASS INDEX: 32.44 KG/M2 | HEART RATE: 62 BPM | HEIGHT: 64 IN | DIASTOLIC BLOOD PRESSURE: 84 MMHG | SYSTOLIC BLOOD PRESSURE: 175 MMHG

## 2024-02-09 VITALS — DIASTOLIC BLOOD PRESSURE: 80 MMHG | SYSTOLIC BLOOD PRESSURE: 140 MMHG

## 2024-02-09 PROCEDURE — 93000 ELECTROCARDIOGRAM COMPLETE: CPT

## 2024-02-09 PROCEDURE — G2211 COMPLEX E/M VISIT ADD ON: CPT

## 2024-02-09 PROCEDURE — 99215 OFFICE O/P EST HI 40 MIN: CPT

## 2024-02-09 RX ORDER — FUROSEMIDE 40 MG/1
40 TABLET ORAL DAILY
Qty: 90 | Refills: 3 | Status: DISCONTINUED | COMMUNITY
Start: 1900-01-01 | End: 2024-02-09

## 2024-02-09 NOTE — HISTORY OF PRESENT ILLNESS
[FreeTextEntry1] : This is a 73 year old man with a history of CAD s/p PCI to the proximal LAD and RI in January of 2016, hypertension and hyperlipidemia who presents to the office for follow up.  He underwent nuclear stress testing in January of 2019 and was found to have a 9 METS exercise capacity with no symptoms.  He had a large area of inferior and inferolateral infarct, along with a medium sized area of mild mid to distal anterior wall ischemia.  We discussed the options, and given that his defect was mild, he had a robust exercise tolerance, and no symptoms, we elected to manage this medically.  He has not had any further chest pains since.   He has also been diagnosed with PAF.   Arrives today for post hospitalization followup. He presented to [place] ED on 12/21/23, BIBEMS with SOB, JORDAN and congestion which has worsened in last day. C-xray was done and showed b/l infiltrates, thought to be PNA. CT chest also done, showec B/L infiltrates vs edema. He was started on antibiotics and given 500cc IVF. Overnight a rapid response was called for increasing SOB, hypoxia (SpO2 71% on 100% nrb) and hypertensive urgency. He was started on BiPap, given IV Lasix 80mg x1 and started on a Nitro gtt.  A bedside POCUS was done and showed +B-lines but no evidence of right heart strain. He was subsequently transferred to the ICU for suspected flash pulmonary edema.  He was discharged on hydralazine 25mg TID, atenolol 50 BID and amlodipine 10mg daily.  He arrives today feeling well. He denies chest pains or pressure. He  denies dizzy spells, feeling faint or fainting, He   denies palpitations or difficulty breathing while laying flat. He denies lower extremity swelling.  He saw his PMD.  Furosemide was stopped as his creatinine was increased.   He is back in NSR today.

## 2024-02-09 NOTE — REASON FOR VISIT
[Cardiac Failure] : cardiac failure [Other: ____] : [unfilled] [Follow-Up - Clinic] : a clinic follow-up of [Coronary Artery Disease] : coronary artery disease [Hyperlipidemia] : hyperlipidemia [Hypertension] : hypertension [Medication Management] : Medication management

## 2024-02-09 NOTE — CARDIOLOGY SUMMARY
[de-identified] : NSR [de-identified] : [facility] ECHO CONCLUSIONS:    1. Technically difficult image quality.  2. Left ventricular systolic function is low-normal with a LVEF estimated at 50-55%.  3. Normal right ventricular cavity size, wall thickness, and systolic function.  4. Mild mitral regurgitation.  5. Mild aortic regurgitation.  6. Trace tricuspid regurgitation.  7. Small pericardial effusion noted adjacent to the posterior left ventricle associated with a large anterior fat pad, without diastolic RV collapse.  8. The inferior vena cava is normal in size measuring 1.33 cm in diameter, (normal <2.1cm) with normal inspiratory collapse (normal >50%) consistent with normal right atrial pressure (~3, range 0-5mmHg).  [___] : [unfilled] [None] : normal LV function

## 2024-02-09 NOTE — DISCUSSION/SUMMARY
[FreeTextEntry1] : This is a 73 year old man with a history of CAD s/p PCI to the proximal LAD and RI in January of 2016, hypertension and hyperlipidemia who presents to the office for follow up post hospitalization.  He also has PAF.  He is in NSR today.   He was admitted with an ADHF event in the setting of uncontrolled HTN.  He arrives today in no acute distress .  He is euvolemic on exam, compensated from a volume perspective.    His blood pressure is improved.   Echocardiogram from his hospitalization demonstrated a normal LVEF without wall motion abnormalities but small pericardial effusion.  He will need an echocardiogram.   He will continue carvedilol 12.5 bid.   He will continue hydralazine 25mg TID. Lasix has been stopped.   Nuclear stress testing from 2019 demonstrated areas of infarct with mild reversible defects consistent with ischemia.  At the time he was not amendable to further testing.    He will continue ASA and atorvastatin 40mg daily for goal LDL <70.  ECG illustrates sinus rhythm today.  He will continue xarelto 20mg daily for thromboembolic prevention.  He will followup again in one month.      [EKG obtained to assist in diagnosis and management of assessed problem(s)] : EKG obtained to assist in diagnosis and management of assessed problem(s)

## 2024-02-09 NOTE — PHYSICAL EXAM
[Normal Appearance] : normal appearance [General Appearance - In No Acute Distress] : no acute distress [Normal Oral Mucosa] : normal oral mucosa [Normal Jugular Venous V Waves Present] : normal jugular venous V waves present [FreeTextEntry1] : No JVD [Respiration, Rhythm And Depth] : normal respiratory rhythm and effort [Exaggerated Use Of Accessory Muscles For Inspiration] : no accessory muscle use [Auscultation Breath Sounds / Voice Sounds] : lungs were clear to auscultation bilaterally [Bowel Sounds] : normal bowel sounds [Abdomen Soft] : soft [Abdomen Tenderness] : non-tender [Abnormal Walk] : normal gait [Gait - Sufficient For Exercise Testing] : the gait was sufficient for exercise testing [Nail Clubbing] : no clubbing of the fingernails [Cyanosis, Localized] : no localized cyanosis [Petechial Hemorrhages (___cm)] : no petechial hemorrhages [Skin Color & Pigmentation] : normal skin color and pigmentation [] : no rash [No Venous Stasis] : no venous stasis [Skin Lesions] : no skin lesions [Affect] : the affect was normal [Oriented To Time, Place, And Person] : oriented to person, place, and time [Mood] : the mood was normal [No Anxiety] : not feeling anxious [Not Palpable] : not palpable [No Precordial Heave] : no precordial heave was noted [Normal Rate] : normal [Irregularly Irregular] : irregularly irregular [2+] : left 2+ [Bruit] : no bruit heard [Well Developed] : well developed [Well Nourished] : well nourished [No Acute Distress] : no acute distress [Normal Conjunctiva] : normal conjunctiva [Normal Venous Pressure] : normal venous pressure [No Carotid Bruit] : no carotid bruit [Normal S1, S2] : normal S1, S2 [No Rub] : no rub [No Gallop] : no gallop [Rhythm Regular] : regular [Normal S1] : normal S1 [Normal S2] : normal S2 [No Murmur] : no murmurs heard [No Pitting Edema] : no pitting edema present [Right Carotid Bruit] : no bruit heard over the right carotid [Left Carotid Bruit] : no bruit heard over the left carotid [No Abnormalities] : the abdominal aorta was not enlarged and no bruit was heard [Clear Lung Fields] : clear lung fields [Good Air Entry] : good air entry [No Respiratory Distress] : no respiratory distress  [Soft] : abdomen soft [Non Tender] : non-tender [No Masses/organomegaly] : no masses/organomegaly [Normal Bowel Sounds] : normal bowel sounds [Normal Gait] : normal gait [No Edema] : no edema [No Cyanosis] : no cyanosis [No Clubbing] : no clubbing [No Varicosities] : no varicosities [No Rash] : no rash [No Skin Lesions] : no skin lesions [Moves all extremities] : moves all extremities [No Focal Deficits] : no focal deficits [Normal Speech] : normal speech [Alert and Oriented] : alert and oriented [Normal memory] : normal memory

## 2024-02-10 NOTE — CONSULT NOTE ADULT - SUBJECTIVE AND OBJECTIVE BOX
68 yo male with hx of b/l inguinal hernias x 1.5 years ago. PT never followed up for surgery and has been having pain x 1 month. PAin throughout abdomen with occasional constipation. No nausea/vomiting. Unclear what prompted pt to come to ER today. PT had +stress test in 1/19, but never followed up for angiogram.  PT currently on plavix and ASA. Denies any fever chills    REVIEW OF SYSTEMS:    CONSTITUTIONAL: No weakness, fatigue, malaise, fevers or chills, no weight change, appetite change  EYES: No visual changes; No double vision,  No vertigo, eye pain  Ears: no otalgia, no otorhea, no hearing loss, tinnitus  Nose: no epistaxis, rhinorrhea, sinus pressure  Throat: no throat pain, no oral lesions  NECK: No pain or stiffness  RESPIRATORY: No cough (productive or dry), wheezing, hemoptysis; No shortness of breath  CARDIOVASCULAR: No chest pain or palpitations,    GASTROINTESTINAL: as above  NEUROLOGICAL: No numbness or weakness, headache, memory loss,   SKIN: No pruritis, rashes, lesions or new moles  Psych: No anxiety, sadness, insomnia,    Endocrine: No Heat or Cold intolerance, polydipsia, polyphagia  Heme/Lymph: no LN enlargement, no easy bruising or bleeding     PAST MEDICAL & SURGICAL HISTORY:  CAD (coronary artery disease): stents x 2  Hyperlipidemia  HTN (hypertension)  Gastroesophageal reflux disease, esophagitis presence not specified  Essential hypertension  No significant past surgical history  No significant past surgical history    Home Medications:  aspirin 81 mg oral tablet: 1 tab(s) orally once a day (27 Mar 2019 19:08)  atenolol 50 mg oral tablet: 1 tab(s) orally once a day (27 Mar 2019 19:08)  clopidogrel 75 mg oral tablet: 1 tab(s) orally once a day (27 Mar 2019 19:08)  esomeprazole 40 mg oral delayed release capsule: 1 cap(s) orally once a day (27 Mar 2019 19:08)  folic acid 1 mg oral tablet: 1 tab(s) orally once a day (27 Mar 2019 19:08)  Lipitor 20 mg oral tablet: 1 tab(s) orally once a day (27 Mar 2019 19:08)      Allergies    No Known Allergies    Intolerances    social hx-denies x3  works in [employer]    .  VITAL SIGNS:  T(C): 36.5 (03-27-19 @ 19:06), Max: 36.5 (03-27-19 @ 19:06)  T(F): 97.7 (03-27-19 @ 19:06), Max: 97.7 (03-27-19 @ 19:06)  HR: 78 (03-27-19 @ 19:06) (78 - 78)  BP: 175/75 (03-27-19 @ 19:06) (175/75 - 175/75)  BP(mean): --  RR: 18 (03-27-19 @ 19:06) (18 - 18)  SpO2: 97% (03-27-19 @ 19:06) (97% - 97%)  Wt(kg): --    PHYSICAL EXAM:    Constitutional:  NAD, resting comfortably in bed  Head: NC/AT  Eyes: PERRL b/l  ENT: MMM  Neck: supple; no JVD or thyromegaly  Respiratory: CTA B/L   Cardiac: +S1/S2; RRR   Gastrointestinal: soft NT/ND, incarcerated Left inguinal hernia, reduced  Back: no CVA B/L  Extremities: WWP, no clubbing or cyanosis; no peripheral edema  Musculoskeletal: NROM x4;   Vascular: 2+ radial, femoral, DP/PT pulses B/L  Dermatologic: skin warm, dry and intact; no rashes, wounds, or scars  Lymphatic: no submandibular, cervical or  LAD  Neurologic: AAOx3; CNII-XII grossly intact; no focal deficits  Psychiatric: affect and characteristics of appearance, verbalizations, behaviors are appropriate                            13.0   6.70  )-----------( 290      ( 27 Mar 2019 19:46 )             41.1   03-27    142  |  108  |  22  ----------------------------<  93  4.2   |  27  |  1.30    Ca    8.8      27 Mar 2019 19:46    TPro  8.1  /  Alb  4.2  /  TBili  0.5  /  DBili  x   /  AST  17  /  ALT  33  /  AlkPhos  102  03-27    CT- pending complains of pain/discomfort

## 2024-02-23 ENCOUNTER — APPOINTMENT (OUTPATIENT)
Dept: CARDIOLOGY | Facility: CLINIC | Age: 74
End: 2024-02-23
Payer: COMMERCIAL

## 2024-02-23 PROCEDURE — 93306 TTE W/DOPPLER COMPLETE: CPT | Mod: JW

## 2024-02-23 RX ORDER — PERFLUTREN 6.52 MG/ML
6.52 INJECTION, SUSPENSION INTRAVENOUS
Qty: 1 | Refills: 0 | Status: COMPLETED | OUTPATIENT
Start: 2024-02-23

## 2024-02-23 RX ADMIN — PERFLUTREN MG/ML: 6.52 INJECTION, SUSPENSION INTRAVENOUS at 00:00

## 2024-03-08 ENCOUNTER — NON-APPOINTMENT (OUTPATIENT)
Age: 74
End: 2024-03-08

## 2024-03-08 ENCOUNTER — APPOINTMENT (OUTPATIENT)
Dept: CARDIOLOGY | Facility: CLINIC | Age: 74
End: 2024-03-08
Payer: COMMERCIAL

## 2024-03-08 VITALS
HEIGHT: 64 IN | WEIGHT: 186 LBS | BODY MASS INDEX: 31.76 KG/M2 | OXYGEN SATURATION: 99 % | HEART RATE: 87 BPM | SYSTOLIC BLOOD PRESSURE: 160 MMHG | DIASTOLIC BLOOD PRESSURE: 112 MMHG

## 2024-03-08 PROCEDURE — G2211 COMPLEX E/M VISIT ADD ON: CPT

## 2024-03-08 PROCEDURE — 99215 OFFICE O/P EST HI 40 MIN: CPT

## 2024-03-08 PROCEDURE — 93000 ELECTROCARDIOGRAM COMPLETE: CPT

## 2024-03-08 RX ORDER — CARVEDILOL 12.5 MG/1
12.5 TABLET, FILM COATED ORAL
Qty: 180 | Refills: 3 | Status: DISCONTINUED | COMMUNITY
Start: 2024-01-02 | End: 2024-03-08

## 2024-03-08 RX ORDER — CARVEDILOL 25 MG/1
25 TABLET, FILM COATED ORAL TWICE DAILY
Qty: 180 | Refills: 3 | Status: ACTIVE | COMMUNITY
Start: 2024-01-02 | End: 1900-01-01

## 2024-03-08 NOTE — CARDIOLOGY SUMMARY
[de-identified] : [facility] ECHO CONCLUSIONS:    1. Technically difficult image quality.  2. Left ventricular systolic function is low-normal with a LVEF estimated at 50-55%.  3. Normal right ventricular cavity size, wall thickness, and systolic function.  4. Mild mitral regurgitation.  5. Mild aortic regurgitation.  6. Trace tricuspid regurgitation.  7. Small pericardial effusion noted adjacent to the posterior left ventricle associated with a large anterior fat pad, without diastolic RV collapse.  8. The inferior vena cava is normal in size measuring 1.33 cm in diameter, (normal <2.1cm) with normal inspiratory collapse (normal >50%) consistent with normal right atrial pressure (~3, range 0-5mmHg).  [de-identified] : Atrial fibrillation [___] : [unfilled] [None] : normal LV function

## 2024-03-08 NOTE — REVIEW OF SYSTEMS
pt with pancreatic ca had been treated for gram negative rods in blood pt with high fever  again [SOB] : no shortness of breath [Chest Discomfort] : no chest discomfort [Dyspnea on exertion] : not dyspnea during exertion [Leg Claudication] : no intermittent leg claudication [Lower Ext Edema] : no extremity edema [Orthopnea] : no orthopnea [Palpitations] : no palpitations [PND] : no PND [Syncope] : no syncope [Negative] : Heme/Lymph

## 2024-03-08 NOTE — HISTORY OF PRESENT ILLNESS
[FreeTextEntry1] : This is a 74 year old man with a history of CAD s/p PCI to the proximal LAD and RI in January of 2016, hypertension and hyperlipidemia who presents to the office for follow up.  He underwent nuclear stress testing in January of 2019 and was found to have a 9 METS exercise capacity with no symptoms.  He had a large area of inferior and inferolateral infarct, along with a medium sized area of mild mid to distal anterior wall ischemia.  We discussed the options, and given that his defect was mild, he had a robust exercise tolerance, and no symptoms, we elected to manage this medically.  He has not had any further chest pains since.   He has also been diagnosed with PAF.   He presented to [place] ED on 12/21/23, BIBEMS with SOB, JORDAN and congestion which has worsened in last day. C-xray was done and showed b/l infiltrates, thought to be PNA. CT chest also done, showec B/L infiltrates vs edema. He was started on antibiotics and given 500cc IVF. Overnight a rapid response was called for increasing SOB, hypoxia (SpO2 71% on 100% nrb) and hypertensive urgency. He was started on BiPap, given IV Lasix 80mg x1 and started on a Nitro gtt.  A bedside POCUS was done and showed +B-lines but no evidence of right heart strain. He was subsequently transferred to the ICU for suspected flash pulmonary edema.   He arrives today feeling well. He denies chest pains or pressure. He  denies dizzy spells, feeling faint or fainting, He   denies palpitations or difficulty breathing while laying flat. He denies lower extremity swelling.  He saw his PMD.   His Lasix was stopped as his creatinine increased.  He remains euvolemic.  He is back in AF today.  Repeat echo in our office showed normal LV function, severe LAE, and moderate MR.

## 2024-03-08 NOTE — PHYSICAL EXAM
[Normal Appearance] : normal appearance [General Appearance - In No Acute Distress] : no acute distress [Normal Oral Mucosa] : normal oral mucosa [Normal Jugular Venous V Waves Present] : normal jugular venous V waves present [Respiration, Rhythm And Depth] : normal respiratory rhythm and effort [FreeTextEntry1] : No JVD [Exaggerated Use Of Accessory Muscles For Inspiration] : no accessory muscle use [Auscultation Breath Sounds / Voice Sounds] : lungs were clear to auscultation bilaterally [Abdomen Tenderness] : non-tender [Bowel Sounds] : normal bowel sounds [Abdomen Soft] : soft [Gait - Sufficient For Exercise Testing] : the gait was sufficient for exercise testing [Abnormal Walk] : normal gait [Nail Clubbing] : no clubbing of the fingernails [Cyanosis, Localized] : no localized cyanosis [Petechial Hemorrhages (___cm)] : no petechial hemorrhages [Skin Color & Pigmentation] : normal skin color and pigmentation [No Venous Stasis] : no venous stasis [] : no rash [Skin Lesions] : no skin lesions [Oriented To Time, Place, And Person] : oriented to person, place, and time [Affect] : the affect was normal [Mood] : the mood was normal [No Anxiety] : not feeling anxious [Not Palpable] : not palpable [No Precordial Heave] : no precordial heave was noted [Irregularly Irregular] : irregularly irregular [Normal Rate] : normal [2+] : left 2+ [Bruit] : no bruit heard [Well Developed] : well developed [Well Nourished] : well nourished [No Acute Distress] : no acute distress [Normal Conjunctiva] : normal conjunctiva [Normal Venous Pressure] : normal venous pressure [No Carotid Bruit] : no carotid bruit [Normal S1, S2] : normal S1, S2 [No Gallop] : no gallop [No Rub] : no rub [Normal S1] : normal S1 [Normal S2] : normal S2 [Rhythm Regular] : regular [No Pitting Edema] : no pitting edema present [No Murmur] : no murmurs heard [Left Carotid Bruit] : no bruit heard over the left carotid [Right Carotid Bruit] : no bruit heard over the right carotid [No Abnormalities] : the abdominal aorta was not enlarged and no bruit was heard [Clear Lung Fields] : clear lung fields [Good Air Entry] : good air entry [No Respiratory Distress] : no respiratory distress  [Soft] : abdomen soft [No Masses/organomegaly] : no masses/organomegaly [Non Tender] : non-tender [Normal Gait] : normal gait [Normal Bowel Sounds] : normal bowel sounds [No Cyanosis] : no cyanosis [No Edema] : no edema [No Clubbing] : no clubbing [No Varicosities] : no varicosities [No Rash] : no rash [Moves all extremities] : moves all extremities [No Skin Lesions] : no skin lesions [Normal Speech] : normal speech [No Focal Deficits] : no focal deficits [Alert and Oriented] : alert and oriented [Normal memory] : normal memory

## 2024-03-08 NOTE — DISCUSSION/SUMMARY
[FreeTextEntry1] : This is a 73 year old man with a history of CAD s/p PCI to the proximal LAD and RI in January of 2016, hypertension PAF, and hyperlipidemia who presents to the office for follow up.     He was admitted with an ADHF event in the setting of uncontrolled HTN in December of 2023.  He arrives today in no acute distress .  He is euvolemic on exam, compensated from a volume perspective.    His blood pressure is still too high. His AF rates are mildly increased.  I am increasing carvedilol to 25 bid.    Echocardiogram from his hospitalization demonstrated a normal LVEF without wall motion abnormalities but small pericardial effusion.  His repeat showed normal LV function, moderate MR, and severe LAE.     He will continue hydralazine 25mg TID. Lasix has been stopped.   Nuclear stress testing from 2019 demonstrated areas of infarct with mild reversible defects consistent with ischemia.  At the time he was not amendable to further testing.    He will continue ASA and atorvastatin 40mg daily for goal LDL <70.  He will continue xarelto 20mg daily for thromboembolic prevention.  He will followup again in one month.      [EKG obtained to assist in diagnosis and management of assessed problem(s)] : EKG obtained to assist in diagnosis and management of assessed problem(s)

## 2024-03-12 ENCOUNTER — INPATIENT (INPATIENT)
Facility: HOSPITAL | Age: 74
LOS: 2 days | Discharge: HOME | DRG: 291 | End: 2024-03-15
Attending: FAMILY MEDICINE | Admitting: FAMILY MEDICINE
Payer: COMMERCIAL

## 2024-03-12 VITALS
WEIGHT: 191.8 LBS | HEART RATE: 77 BPM | DIASTOLIC BLOOD PRESSURE: 83 MMHG | HEIGHT: 64 IN | RESPIRATION RATE: 18 BRPM | TEMPERATURE: 97 F | OXYGEN SATURATION: 90 % | SYSTOLIC BLOOD PRESSURE: 163 MMHG

## 2024-03-12 DIAGNOSIS — I25.10 ATHEROSCLEROTIC HEART DISEASE OF NATIVE CORONARY ARTERY WITHOUT ANGINA PECTORIS: Chronic | ICD-10-CM

## 2024-03-12 LAB
ANISOCYTOSIS BLD QL: SLIGHT — SIGNIFICANT CHANGE UP
BASOPHILS # BLD AUTO: 0.09 K/UL — SIGNIFICANT CHANGE UP (ref 0–0.2)
BASOPHILS NFR BLD AUTO: 0.8 % — SIGNIFICANT CHANGE UP (ref 0–2)
EOSINOPHIL # BLD AUTO: 0.45 K/UL — SIGNIFICANT CHANGE UP (ref 0–0.5)
EOSINOPHIL NFR BLD AUTO: 3.8 % — SIGNIFICANT CHANGE UP (ref 0–6)
HCT VFR BLD CALC: 38.2 % — LOW (ref 39–50)
HGB BLD-MCNC: 12.4 G/DL — LOW (ref 13–17)
IMM GRANULOCYTES NFR BLD AUTO: 0.3 % — SIGNIFICANT CHANGE UP (ref 0–0.9)
LYMPHOCYTES # BLD AUTO: 1.31 K/UL — SIGNIFICANT CHANGE UP (ref 1–3.3)
LYMPHOCYTES # BLD AUTO: 10.9 % — LOW (ref 13–44)
MANUAL SMEAR VERIFICATION: SIGNIFICANT CHANGE UP
MCHC RBC-ENTMCNC: 24.1 PG — LOW (ref 27–34)
MCHC RBC-ENTMCNC: 32.5 GM/DL — SIGNIFICANT CHANGE UP (ref 32–36)
MCV RBC AUTO: 74.3 FL — LOW (ref 80–100)
MICROCYTES BLD QL: SLIGHT — SIGNIFICANT CHANGE UP
MONOCYTES # BLD AUTO: 0.6 K/UL — SIGNIFICANT CHANGE UP (ref 0–0.9)
MONOCYTES NFR BLD AUTO: 5 % — SIGNIFICANT CHANGE UP (ref 2–14)
NEUTROPHILS # BLD AUTO: 9.48 K/UL — HIGH (ref 1.8–7.4)
NEUTROPHILS NFR BLD AUTO: 79.2 % — HIGH (ref 43–77)
NRBC # BLD: 0 /100 WBCS — SIGNIFICANT CHANGE UP (ref 0–0)
OVALOCYTES BLD QL SMEAR: SLIGHT — SIGNIFICANT CHANGE UP
PLAT MORPH BLD: NORMAL — SIGNIFICANT CHANGE UP
PLATELET # BLD AUTO: 217 K/UL — SIGNIFICANT CHANGE UP (ref 150–400)
PLATELET COUNT - ESTIMATE: NORMAL — SIGNIFICANT CHANGE UP
POIKILOCYTOSIS BLD QL AUTO: SLIGHT — SIGNIFICANT CHANGE UP
RBC # BLD: 5.14 M/UL — SIGNIFICANT CHANGE UP (ref 4.2–5.8)
RBC # FLD: 17.1 % — HIGH (ref 10.3–14.5)
RBC BLD AUTO: SIGNIFICANT CHANGE UP
WBC # BLD: 11.97 K/UL — HIGH (ref 3.8–10.5)
WBC # FLD AUTO: 11.97 K/UL — HIGH (ref 3.8–10.5)

## 2024-03-12 PROCEDURE — 99285 EMERGENCY DEPT VISIT HI MDM: CPT

## 2024-03-12 PROCEDURE — 93010 ELECTROCARDIOGRAM REPORT: CPT

## 2024-03-12 RX ORDER — METOPROLOL TARTRATE 50 MG
5 TABLET ORAL ONCE
Refills: 0 | Status: COMPLETED | OUTPATIENT
Start: 2024-03-12 | End: 2024-03-12

## 2024-03-12 RX ORDER — FUROSEMIDE 40 MG
40 TABLET ORAL ONCE
Refills: 0 | Status: COMPLETED | OUTPATIENT
Start: 2024-03-12 | End: 2024-03-12

## 2024-03-12 RX ADMIN — Medication 40 MILLIGRAM(S): at 23:40

## 2024-03-12 NOTE — ED ADULT NURSE NOTE - OBJECTIVE STATEMENT
Pt is a 74yr old male, c/o shortness of breath. Pt has hx of CHF, denies travel, sick contacts. Pt is A+Ox3, calm and cooperative, able to move all extremities. Unlabored breathing at this time. No distress noted. MD evaluation in progress.

## 2024-03-12 NOTE — ED PROVIDER NOTE - OBJECTIVE STATEMENT
74yoM with a PMH of CAD sp PCI/ANAHI x 2 to LAD/[place] 2016, MI 2016, HTN, HLD, AF on AC, CKD,  pw with SOB, JORDAN and orthopnea for last few days. has been taking his lasix. no chest pain, fevers, or cough, feels lik ehis acid reflux is worse

## 2024-03-12 NOTE — ED PROVIDER NOTE - PHYSICAL EXAMINATION
Gen: Well appearing in NAD  Head: NC/AT  Neck: trachea midline  cvl; irregular  Resp:  No distress, lungs clear  +2 pitting edema le bl   Ext: no deformities  Neuro:  A&O appears non focal  Skin:  Warm and dry as visualized  Psych:  Normal affect and mood

## 2024-03-12 NOTE — ED PROVIDER NOTE - CLINICAL SUMMARY MEDICAL DECISION MAKING FREE TEXT BOX
74yoM with a PMH of CAD sp PCI/ANAHI x 2 to LAD/Ramus 2016, MI 2016, HTN, HLD, AF on AC, CKD,  pw with SOB, JORDAN and orthopnea for last few days. has been taking his lasix. no chest pain, fevers, or cough, feels lik ehis acid reflux is worse   + bl le pitting edema, likely chf exacerbation  labs, xr, trop, US, diuresis  caards dr [email or web address]

## 2024-03-13 ENCOUNTER — TRANSCRIPTION ENCOUNTER (OUTPATIENT)
Age: 74
End: 2024-03-13

## 2024-03-13 DIAGNOSIS — I50.33 ACUTE ON CHRONIC DIASTOLIC (CONGESTIVE) HEART FAILURE: ICD-10-CM

## 2024-03-13 DIAGNOSIS — I48.91 UNSPECIFIED ATRIAL FIBRILLATION: ICD-10-CM

## 2024-03-13 DIAGNOSIS — J81.1 CHRONIC PULMONARY EDEMA: ICD-10-CM

## 2024-03-13 DIAGNOSIS — J96.01 ACUTE RESPIRATORY FAILURE WITH HYPOXIA: ICD-10-CM

## 2024-03-13 DIAGNOSIS — I25.10 ATHEROSCLEROTIC HEART DISEASE OF NATIVE CORONARY ARTERY WITHOUT ANGINA PECTORIS: ICD-10-CM

## 2024-03-13 DIAGNOSIS — I10 ESSENTIAL (PRIMARY) HYPERTENSION: ICD-10-CM

## 2024-03-13 LAB
ALBUMIN SERPL ELPH-MCNC: 3.7 G/DL — SIGNIFICANT CHANGE UP (ref 3.3–5)
ALP SERPL-CCNC: 109 U/L — SIGNIFICANT CHANGE UP (ref 40–120)
ALT FLD-CCNC: 37 U/L — SIGNIFICANT CHANGE UP (ref 12–78)
ANION GAP SERPL CALC-SCNC: 10 MMOL/L — SIGNIFICANT CHANGE UP (ref 5–17)
ANION GAP SERPL CALC-SCNC: 10 MMOL/L — SIGNIFICANT CHANGE UP (ref 5–17)
AST SERPL-CCNC: 30 U/L — SIGNIFICANT CHANGE UP (ref 15–37)
BILIRUB SERPL-MCNC: 0.5 MG/DL — SIGNIFICANT CHANGE UP (ref 0.2–1.2)
BUN SERPL-MCNC: 36 MG/DL — HIGH (ref 7–23)
BUN SERPL-MCNC: 37 MG/DL — HIGH (ref 7–23)
CALCIUM SERPL-MCNC: 8.3 MG/DL — LOW (ref 8.5–10.1)
CALCIUM SERPL-MCNC: 8.7 MG/DL — SIGNIFICANT CHANGE UP (ref 8.5–10.1)
CHLORIDE SERPL-SCNC: 108 MMOL/L — SIGNIFICANT CHANGE UP (ref 96–108)
CHLORIDE SERPL-SCNC: 112 MMOL/L — HIGH (ref 96–108)
CK MB BLD-MCNC: 1.5 % — SIGNIFICANT CHANGE UP (ref 0–3.5)
CK MB CFR SERPL CALC: 1.2 NG/ML — SIGNIFICANT CHANGE UP (ref 0–3.6)
CK SERPL-CCNC: 81 U/L — SIGNIFICANT CHANGE UP (ref 26–308)
CO2 SERPL-SCNC: 18 MMOL/L — LOW (ref 22–31)
CO2 SERPL-SCNC: 22 MMOL/L — SIGNIFICANT CHANGE UP (ref 22–31)
CREAT SERPL-MCNC: 2 MG/DL — HIGH (ref 0.5–1.3)
CREAT SERPL-MCNC: 2 MG/DL — HIGH (ref 0.5–1.3)
EGFR: 35 ML/MIN/1.73M2 — LOW
EGFR: 35 ML/MIN/1.73M2 — LOW
GLUCOSE SERPL-MCNC: 116 MG/DL — HIGH (ref 70–99)
GLUCOSE SERPL-MCNC: 134 MG/DL — HIGH (ref 70–99)
HCT VFR BLD CALC: 41.7 % — SIGNIFICANT CHANGE UP (ref 39–50)
HGB BLD-MCNC: 13.4 G/DL — SIGNIFICANT CHANGE UP (ref 13–17)
MAGNESIUM SERPL-MCNC: 2.1 MG/DL — SIGNIFICANT CHANGE UP (ref 1.6–2.6)
MCHC RBC-ENTMCNC: 23.6 PG — LOW (ref 27–34)
MCHC RBC-ENTMCNC: 32.1 GM/DL — SIGNIFICANT CHANGE UP (ref 32–36)
MCV RBC AUTO: 73.5 FL — LOW (ref 80–100)
NRBC # BLD: 0 /100 WBCS — SIGNIFICANT CHANGE UP (ref 0–0)
NT-PROBNP SERPL-SCNC: 1065 PG/ML — HIGH (ref 0–125)
PLATELET # BLD AUTO: 250 K/UL — SIGNIFICANT CHANGE UP (ref 150–400)
POTASSIUM SERPL-MCNC: 3.8 MMOL/L — SIGNIFICANT CHANGE UP (ref 3.5–5.3)
POTASSIUM SERPL-MCNC: 4.3 MMOL/L — SIGNIFICANT CHANGE UP (ref 3.5–5.3)
POTASSIUM SERPL-SCNC: 3.8 MMOL/L — SIGNIFICANT CHANGE UP (ref 3.5–5.3)
POTASSIUM SERPL-SCNC: 4.3 MMOL/L — SIGNIFICANT CHANGE UP (ref 3.5–5.3)
PROT SERPL-MCNC: 7.3 G/DL — SIGNIFICANT CHANGE UP (ref 6–8.3)
RBC # BLD: 5.67 M/UL — SIGNIFICANT CHANGE UP (ref 4.2–5.8)
RBC # FLD: 17.7 % — HIGH (ref 10.3–14.5)
SODIUM SERPL-SCNC: 140 MMOL/L — SIGNIFICANT CHANGE UP (ref 135–145)
SODIUM SERPL-SCNC: 140 MMOL/L — SIGNIFICANT CHANGE UP (ref 135–145)
TROPONIN I, HIGH SENSITIVITY RESULT: 20.7 NG/L — SIGNIFICANT CHANGE UP
TROPONIN I, HIGH SENSITIVITY RESULT: 23.9 NG/L — SIGNIFICANT CHANGE UP
WBC # BLD: 11.15 K/UL — HIGH (ref 3.8–10.5)
WBC # FLD AUTO: 11.15 K/UL — HIGH (ref 3.8–10.5)

## 2024-03-13 PROCEDURE — 99223 1ST HOSP IP/OBS HIGH 75: CPT

## 2024-03-13 PROCEDURE — 71046 X-RAY EXAM CHEST 2 VIEWS: CPT | Mod: 26

## 2024-03-13 RX ORDER — INFLUENZA VIRUS VACCINE 15; 15; 15; 15 UG/.5ML; UG/.5ML; UG/.5ML; UG/.5ML
0.7 SUSPENSION INTRAMUSCULAR ONCE
Refills: 0 | Status: DISCONTINUED | OUTPATIENT
Start: 2024-03-13 | End: 2024-03-15

## 2024-03-13 RX ORDER — FUROSEMIDE 40 MG
40 TABLET ORAL EVERY 12 HOURS
Refills: 0 | Status: ACTIVE | OUTPATIENT
Start: 2024-03-13 | End: 2025-02-09

## 2024-03-13 RX ORDER — ATORVASTATIN CALCIUM 80 MG/1
40 TABLET, FILM COATED ORAL AT BEDTIME
Refills: 0 | Status: DISCONTINUED | OUTPATIENT
Start: 2024-03-13 | End: 2024-03-15

## 2024-03-13 RX ORDER — ATENOLOL 25 MG/1
50 TABLET ORAL
Refills: 0 | Status: DISCONTINUED | OUTPATIENT
Start: 2024-03-13 | End: 2024-03-13

## 2024-03-13 RX ORDER — ACETAMINOPHEN 500 MG
650 TABLET ORAL EVERY 6 HOURS
Refills: 0 | Status: DISCONTINUED | OUTPATIENT
Start: 2024-03-13 | End: 2024-03-15

## 2024-03-13 RX ORDER — ASPIRIN/CALCIUM CARB/MAGNESIUM 324 MG
81 TABLET ORAL DAILY
Refills: 0 | Status: DISCONTINUED | OUTPATIENT
Start: 2024-03-13 | End: 2024-03-15

## 2024-03-13 RX ORDER — DILTIAZEM HCL 120 MG
30 CAPSULE, EXT RELEASE 24 HR ORAL EVERY 6 HOURS
Refills: 0 | Status: DISCONTINUED | OUTPATIENT
Start: 2024-03-13 | End: 2024-03-14

## 2024-03-13 RX ORDER — HYDRALAZINE HCL 50 MG
25 TABLET ORAL THREE TIMES A DAY
Refills: 0 | Status: DISCONTINUED | OUTPATIENT
Start: 2024-03-13 | End: 2024-03-13

## 2024-03-13 RX ORDER — DILTIAZEM HCL 120 MG
10 CAPSULE, EXT RELEASE 24 HR ORAL ONCE
Refills: 0 | Status: DISCONTINUED | OUTPATIENT
Start: 2024-03-13 | End: 2024-03-13

## 2024-03-13 RX ORDER — FOLIC ACID 0.8 MG
1 TABLET ORAL DAILY
Refills: 0 | Status: DISCONTINUED | OUTPATIENT
Start: 2024-03-13 | End: 2024-03-15

## 2024-03-13 RX ORDER — TAMSULOSIN HYDROCHLORIDE 0.4 MG/1
0.4 CAPSULE ORAL AT BEDTIME
Refills: 0 | Status: DISCONTINUED | OUTPATIENT
Start: 2024-03-13 | End: 2024-03-13

## 2024-03-13 RX ORDER — RIVAROXABAN 15 MG-20MG
1 KIT ORAL
Refills: 0 | DISCHARGE

## 2024-03-13 RX ORDER — LANOLIN ALCOHOL/MO/W.PET/CERES
3 CREAM (GRAM) TOPICAL AT BEDTIME
Refills: 0 | Status: DISCONTINUED | OUTPATIENT
Start: 2024-03-13 | End: 2024-03-15

## 2024-03-13 RX ORDER — AMLODIPINE BESYLATE 2.5 MG/1
10 TABLET ORAL DAILY
Refills: 0 | Status: DISCONTINUED | OUTPATIENT
Start: 2024-03-13 | End: 2024-03-13

## 2024-03-13 RX ORDER — RIVAROXABAN 15 MG-20MG
15 KIT ORAL
Refills: 0 | Status: DISCONTINUED | OUTPATIENT
Start: 2024-03-13 | End: 2024-03-15

## 2024-03-13 RX ORDER — TAMSULOSIN HYDROCHLORIDE 0.4 MG/1
1 CAPSULE ORAL
Refills: 0 | DISCHARGE

## 2024-03-13 RX ORDER — ONDANSETRON 8 MG/1
4 TABLET, FILM COATED ORAL EVERY 6 HOURS
Refills: 0 | Status: DISCONTINUED | OUTPATIENT
Start: 2024-03-13 | End: 2024-03-15

## 2024-03-13 RX ORDER — PANTOPRAZOLE SODIUM 20 MG/1
40 TABLET, DELAYED RELEASE ORAL EVERY 12 HOURS
Refills: 0 | Status: DISCONTINUED | OUTPATIENT
Start: 2024-03-13 | End: 2024-03-15

## 2024-03-13 RX ORDER — SUCRALFATE 1 G
1 TABLET ORAL EVERY 6 HOURS
Refills: 0 | Status: DISCONTINUED | OUTPATIENT
Start: 2024-03-13 | End: 2024-03-15

## 2024-03-13 RX ORDER — CARVEDILOL PHOSPHATE 80 MG/1
1 CAPSULE, EXTENDED RELEASE ORAL
Refills: 0 | DISCHARGE

## 2024-03-13 RX ORDER — CARVEDILOL PHOSPHATE 80 MG/1
25 CAPSULE, EXTENDED RELEASE ORAL EVERY 12 HOURS
Refills: 0 | Status: DISCONTINUED | OUTPATIENT
Start: 2024-03-13 | End: 2024-03-15

## 2024-03-13 RX ADMIN — Medication 5 MILLIGRAM(S): at 00:24

## 2024-03-13 RX ADMIN — Medication 81 MILLIGRAM(S): at 12:03

## 2024-03-13 RX ADMIN — Medication 25 MILLIGRAM(S): at 05:09

## 2024-03-13 RX ADMIN — TAMSULOSIN HYDROCHLORIDE 0.4 MILLIGRAM(S): 0.4 CAPSULE ORAL at 21:03

## 2024-03-13 RX ADMIN — RIVAROXABAN 15 MILLIGRAM(S): KIT at 17:33

## 2024-03-13 RX ADMIN — Medication 30 MILLIGRAM(S): at 20:23

## 2024-03-13 RX ADMIN — Medication 30 MILLILITER(S): at 05:13

## 2024-03-13 RX ADMIN — PANTOPRAZOLE SODIUM 40 MILLIGRAM(S): 20 TABLET, DELAYED RELEASE ORAL at 17:33

## 2024-03-13 RX ADMIN — CARVEDILOL PHOSPHATE 25 MILLIGRAM(S): 80 CAPSULE, EXTENDED RELEASE ORAL at 05:09

## 2024-03-13 RX ADMIN — Medication 30 MILLIGRAM(S): at 14:00

## 2024-03-13 RX ADMIN — Medication 1 GRAM(S): at 17:33

## 2024-03-13 RX ADMIN — Medication 1 MILLIGRAM(S): at 12:03

## 2024-03-13 RX ADMIN — ONDANSETRON 4 MILLIGRAM(S): 8 TABLET, FILM COATED ORAL at 07:01

## 2024-03-13 RX ADMIN — Medication 40 MILLIGRAM(S): at 05:09

## 2024-03-13 RX ADMIN — Medication 30 MILLIGRAM(S): at 09:43

## 2024-03-13 RX ADMIN — ATORVASTATIN CALCIUM 40 MILLIGRAM(S): 80 TABLET, FILM COATED ORAL at 21:03

## 2024-03-13 RX ADMIN — Medication 40 MILLIGRAM(S): at 17:33

## 2024-03-13 RX ADMIN — CARVEDILOL PHOSPHATE 25 MILLIGRAM(S): 80 CAPSULE, EXTENDED RELEASE ORAL at 17:33

## 2024-03-13 NOTE — CONSULT NOTE ADULT - SUBJECTIVE AND OBJECTIVE BOX
[facility] - Roxi Pardo, Jose Miguel, Rommel, Mina, Caroline, Cristi; Office Number: [phone]    Initial Consult Note  CHIEF COMPLAINT: Patient is a 74y old  Male who presents with a chief complaint of SOB (13 Mar 2024 06:45)    HPI: 74yM with a PMH of CAD sp PCI/ANAHI x 2 to LAD/Ramus 2016, MI 2016, HTN, HLD, AF on AC, CKD, who presents with SOB, JORDAN and orthopnea for last few days. HE has been taking his lasix. He denies chest pain, fevers, or cough.    In ED, T(F): 98.1, HR:  (77 - 112), BP:  (134/88 - 163/83), RR: 18, SpO2:  (90% - 96%). Labs remarkable for BUN 36, Creat 2.00, CK 81, CKMB units 1.2, Troponin HsI 23.9, BNP 1065.       Allergies  No Known Allergies    Intolerances      PAST MEDICAL & SURGICAL HISTORY:  Essential hypertension      Gastroesophageal reflux disease, esophagitis presence not specified      HTN (hypertension)      Hyperlipidemia      CAD (coronary artery disease)  stents x 2      CAD S/P percutaneous coronary angioplasty  with ANAHI        MEDICATIONS  (STANDING):  aspirin enteric coated 81 milliGRAM(s) Oral daily  atorvastatin 40 milliGRAM(s) Oral at bedtime  carvedilol 25 milliGRAM(s) Oral every 12 hours  folic acid 1 milliGRAM(s) Oral daily  furosemide   Injectable 40 milliGRAM(s) IV Push every 12 hours  hydrALAZINE 25 milliGRAM(s) Oral three times a day  rivaroxaban 15 milliGRAM(s) Oral with dinner  tamsulosin 0.4 milliGRAM(s) Oral at bedtime    MEDICATIONS  (PRN):  acetaminophen     Tablet .. 650 milliGRAM(s) Oral every 6 hours PRN Temp greater or equal to 38C (100.4F), Mild Pain (1 - 3)  aluminum hydroxide/magnesium hydroxide/simethicone Suspension 30 milliLiter(s) Oral every 4 hours PRN Dyspepsia  melatonin 3 milliGRAM(s) Oral at bedtime PRN Insomnia  ondansetron Injectable 4 milliGRAM(s) IV Push every 6 hours PRN Nausea and/or Vomiting    FAMILY HISTORY:  FH: heart disease  father and mother        No family history of acute MI or sudden cardiac death.    SOCIAL HISTORY: No active tobacco, ethanol, or drug abuse.    REVIEW OF SYSTEMS   All other review of systems is negative unless indicated above.    VITAL SIGNS:   Vital Signs Last 24 Hrs  T(C): 36.7 (13 Mar 2024 04:55), Max: 36.7 (13 Mar 2024 01:49)  T(F): 98.1 (13 Mar 2024 04:55), Max: 98.1 (13 Mar 2024 04:55)  HR: 112 (13 Mar 2024 04:55) (77 - 112)  BP: 134/88 (13 Mar 2024 04:55) (134/88 - 163/83)  BP(mean): --  RR: 18 (13 Mar 2024 04:55) (18 - 20)  SpO2: 93% (13 Mar 2024 04:55) (90% - 96%)    Parameters below as of 13 Mar 2024 04:55  Patient On (Oxygen Delivery Method): nasal cannula  O2 Flow (L/min): 2      Physical Exam:  Constitutional: NAD, awake and alert  HEENT: Moist Mucous Membranes, Anicteric  Pulmonary: Non-labored, breath sounds are clear bilaterally, No wheezing, rales or rhonchi  Cardiovascular: Regular, S1 and S2, No murmurs, No rubs, gallops or clicks  Gastrointestinal: Bowel Sounds present, soft, nontender.   Lymph: No peripheral edema. No lymphadenopathy.  Skin: No visible rashes or ulcers.  Psych:  Mood & affect appropriate    I&O's Summary      LABS: All Labs Reviewed:                        13.   11.15 )-----------( 250      ( 13 Mar 2024 06:04 )             41.7                         12.4   11.97 )-----------( 217      ( 12 Mar 2024 23:25 )             38.2     13 Mar 2024 06:04    140    |  108    |  36     ----------------------------<  134    3.8     |  22     |  2.00   12 Mar 2024 23:25    140    |  112    |  37     ----------------------------<  116    4.3     |  18     |  2.00     Ca    8.7        13 Mar 2024 06:04  Ca    8.3        12 Mar 2024 23:25  Mg     2.1       13 Mar 2024 06:04    TPro  7.3    /  Alb  3.7    /  TBili  0.5    /  DBili  x      /  AST  30     /  ALT  37     /  AlkPhos  109    12 Mar 2024 23:25    Creatine Kinase, Serum: 81 U/L (24 @ 06:04)  Troponin I, High Sensitivity Result: 23.9 ng/L (24 @ 06:04)  Troponin I, High Sensitivity Result: 20.7 ng/L (24 @ 23:25)  Thyroid Stimulating Hormone, Serum: 3.02 uIU/mL (23 @ 05:00)          TRANSTHORACIC ECHOCARDIOGRAM REPORT  ________________________________________________________________________________                                      _______       Pt. Name:       TRUDI COOLEY Study Date:    2023  MRN:            [id number]      YOB: 1950  Accession #:    [id number]     Age:           73 years  Account#:       [id number]    Gender:        M  Heart Rate:                   Height:        64.17 in (163.00 cm)  Rhythm:                       Weight:        189.59 lb (86.00 kg)  Blood Pressure: 136/61 mmHg   BSA/BMI:       1.92 m² / 32.37 kg/m²  ________________________________________________________________________________________  Referring Physician: [id number] Claudy Real  Primary Sonographer: Yessica Rdz [facility]    CPT:               ECHO TTE WO CON COMP W DOPP - 77980.m  Indication(s):     Dyspnea, unspecified - R06.00  Procedure:         Transthoracic echocardiogram with 2-D, M-mode and complete                     spectral and color flow Doppler.  Ordering Location: [facility]  Study Information: Image quality for this study is technically difficult.    _______________________________________________________________________________________     CONCLUSIONS:      1. Technically difficult image quality.   2. Left ventricular systolic function is low-normal with a LVEF estimated at 50-55%.   3. Normal right ventricular cavity size, wall thickness, and systolic function.   4. Mild mitral regurgitation.   5. Mild aortic regurgitation.   6. Tracetricuspid regurgitation.   7. Small pericardial effusion noted adjacent to the posterior left ventricle associated with a large anterior fat pad, without diastolic RV collapse.   8. The inferior vena cava is normal in size measuring 1.33 cm in diameter, (normal <2.1cm) with normal inspiratory collapse (normal >50%) consistent with normal right atrial pressure (~3, range 0-5mmHg).    ________________________________________________________________________________________  FINDINGS:     Left Ventricle:  Left ventricular systolic function is low normal with a calculated ejection fraction of 50-55% by the Burns's biplane method of disks. The left ventricular diastolic function is indeterminate.     Right Ventricle:  The right ventricular cavityis normal in size, normal wall thickness and normal systolic function.     Left Atrium:  The left atrium is normal.     Right Atrium:  The right atrium is normal in size with an indexed volume of 23.64 ml/m².     Aortic Valve:  The aortic valve anatomy cannot be determined with normal systolic excursion. There is moderate calcification of the aortic valve leaflets. There is no aortic valve stenosis. There is mild aortic regurgitation. AI VMax is 4.49 m/s. AI pressure half time is 602 msec. AI slope is 2.18 m/s².     Mitral Valve:  Structurally normal mitral valve with normal leaflet excursion. There is mild posterior calcification of the mitral valve annulus. There is mild mitral regurgitation.     Tricuspid Valve:  Structurally normal tricuspid valve with normal leaflet excursion. There is trace tricuspid regurgitation.     Pulmonic Valve:  The pulmonic valve was not well visualized.     Aorta:  The aortic root at the sinuses of Valsalva is normal in size, measuring 2.80 cm (indexed 1.46cm/m²). The aortic arch diameter is normal in size, measuring 2.2 cm (indexed 1.15 cm/m²).     Pericardium:  There is a small pericardial effusion noted adjacent to the posterior left ventricle.     Systemic Veins:  The inferior vena cava is normal in size measuring 1.33 cm in diameter, (normal <2.1cm) with normal inspiratory collapse (normal >50%) consistent with normal right atrial pressure (~3, range 0-5mmHg).  ____________________________________________________________________  QUANTITATIVE DATA:  Left Ventricle Measurements: (Indexed to BSA)     IVSd (2D):   1.1 cm  LVPWd (2D):  1.1 cm  LVIDd (2D):  4.9 cm  LVIDs (2D):  3.7 cm  LV Mass:     209 g  109.0 g/m²  LV Vol d, MOD A2C: 130.0 ml 67.85 ml/m²  LV Vol d, MOD A4C: 128.0 ml 66.81 ml/m²  LV Vol d, MOD BP:  129.5 ml 67.60 ml/m²  LV Vol s, MOD A2C: 63.4 ml  33.09 ml/m²  LV Vol s, MOD A4C: 67.1 ml  35.02 ml/m²  LV Vol s, MOD BP:  65.3 ml  34.06 ml/m²  LVOT SV MOD BP:    64.3 ml  LV EF% MOD BP:     50 %     MV E Vmax:    0.98 m/s  MV A Vmax:    0.39 m/s  MV E/A:       2.51  e' lateral:   9.14 cm/s  e' medial:    7.18 cm/s  E/e' lateral: 10.78  E/e' medial:  13.72  E/e' Average: 12.07  MV DT:        208 msec    Aorta Measurements: (normal range) (Indexed to BSA)     Sinuses of Valsalva: 2.80 cm (3.1 - 3.7 cm)  Ao Arch:             2.2 cm       Left Atrium Measurements: (Indexed to BSA)  LA Diam 2D: 3.50 cm    Right Atrial Measurements:     RA Vol:       45.30 ml  RA Vol Index: 23.64 ml/m²       LVOT / RVOT/ Qp/Qs Data: (Indexedto BSA)  LVOT Diameter: 2.10 cm    Aortic Valve Measurements:  AR Vmax  4.49 m/s  AR PHT   602 msec  AR [place] 2.18 m/s²    Mitral Valve Measurements:     MV E Vmax: 1.0 m/s         MR Vmax:          5.11 m/s  MV A Vmax: 0.4 m/s         MR Peak Gradient: 104.4 mmHg  MV E/A:    2.5       Tricuspid Valve Measurements:     RA Pressure: 3 mmHg    ________________________________________________________________________________________  Electronically signed on 2023 at 5:11:05 PM by Maggie Colin MD         *** Final ***   [facility] - Roxi Pardo, Jose Miguel, Rommel, Mina, Caroline, Cristi; Office Number: [phone]    Initial Consult Note  CHIEF COMPLAINT: Patient is a 74y old  Male who presents with a chief complaint of SOB (13 Mar 2024 06:45)    HPI: 74 M with a PMH of CAD sp PCI/ANAHI x 2 to LAD/Ramus 2016, MI 2016, HTN, HLD, AF on AC, CKD, who presents with SOB, JORDAN and orthopnea for last few days. HE has been taking his Lasix. Pt reports that he started havinf some SOB and LE edema past 2 weeks. Also reports some orthopnea and GERD symptoms. He denies chest pain, fevers, or cough. Pt reports of having LE cramps, suddenly got up from bed, became dizzy,  felt like he will pass out and vomited x 1. Follows Dr Gillespie at office. In ED, T(F): 98.1, HR:  (77 - 112), BP:  (134/88 - 163/83), RR: 18, SpO2:  (90% - 96%). Labs remarkable for BUN 36, Creat 2.00, CK 81, CKMB units 1.2, Troponin HsI 23.9, BNP 1065. He is in A fl[facility] inpatient.     Allergies  No Known Allergies    Intolerances      PAST MEDICAL & SURGICAL HISTORY:  Essential hypertension      Gastroesophageal reflux disease, esophagitis presence not specified      HTN (hypertension)      Hyperlipidemia      CAD (coronary artery disease)  stents x 2      CAD S/P percutaneous coronary angioplasty  with ANAHI        MEDICATIONS  (STANDING):  aspirin enteric coated 81 milliGRAM(s) Oral daily  atorvastatin 40 milliGRAM(s) Oral at bedtime  carvedilol 25 milliGRAM(s) Oral every 12 hours  folic acid 1 milliGRAM(s) Oral daily  furosemide   Injectable 40 milliGRAM(s) IV Push every 12 hours  hydrALAZINE 25 milliGRAM(s) Oral three times a day  rivaroxaban 15 milliGRAM(s) Oral with dinner  tamsulosin 0.4 milliGRAM(s) Oral at bedtime    MEDICATIONS  (PRN):  acetaminophen     Tablet .. 650 milliGRAM(s) Oral every 6 hours PRN Temp greater or equal to 38C (100.4F), Mild Pain (1 - 3)  aluminum hydroxide/magnesium hydroxide/simethicone Suspension 30 milliLiter(s) Oral every 4 hours PRN Dyspepsia  melatonin 3 milliGRAM(s) Oral at bedtime PRN Insomnia  ondansetron Injectable 4 milliGRAM(s) IV Push every 6 hours PRN Nausea and/or Vomiting    FAMILY HISTORY:  FH: heart disease  father and mother        No family history of acute MI or sudden cardiac death.    SOCIAL HISTORY: No active tobacco, ethanol, or drug abuse.    REVIEW OF SYSTEMS   All other review of systems is negative unless indicated above.    VITAL SIGNS:   Vital Signs Last 24 Hrs  T(C): 36.7 (13 Mar 2024 04:55), Max: 36.7 (13 Mar 2024 01:49)  T(F): 98.1 (13 Mar 2024 04:55), Max: 98.1 (13 Mar 2024 04:55)  HR: 112 (13 Mar 2024 04:55) (77 - 112)  BP: 134/88 (13 Mar 2024 04:55) (134/88 - 163/83)  BP(mean): --  RR: 18 (13 Mar 2024 04:55) (18 - 20)  SpO2: 93% (13 Mar 2024 04:55) (90% - 96%)    Parameters below as of 13 Mar 2024 04:55  Patient On (Oxygen Delivery Method): nasal cannula  O2 Flow (L/min): 2      Physical Exam:  Constitutional: NAD, awake and alert  HEENT: Moist Mucous Membranes, Anicteric  Pulmonary: Non-labored, breath sounds are clear bilaterally, No wheezing, rales or rhonchi  Cardiovascular: Regular, S1 and S2, + murmurs, No rubs, gallops or clicks  Gastrointestinal: Bowel Sounds present, soft, nontender.   Lymph: +1 peripheral edema. No lymphadenopathy.  Skin: No visible rashes or ulcers.  Psych:  Mood & affect appropriate    I&O's Summary      LABS: All Labs Reviewed:                        .15 )-----------( 250      ( 13 Mar 2024 06:04 )             41.7                         .   11.97 )-----------( 217      ( 12 Mar 2024 23:25 )             38.2     13 Mar 2024 06:04    140    |  108    |  36     ----------------------------<  134    3.8     |  22     |  2.00   12 Mar 2024 23:25    140    |  112    |  37     ----------------------------<  116    4.3     |  18     |  2.00     Ca    8.7        13 Mar 2024 06:04  Ca    8.3        12 Mar 2024 23:25  Mg     2.1       13 Mar 2024 06:04    TPro  7.3    /  Alb  3.7    /  TBili  0.5    /  DBili  x      /  AST  30     /  ALT  37     /  AlkPhos  109    12 Mar 2024 23:25    Creatine Kinase, Serum: 81 U/L (24 @ 06:04)  Troponin I, High Sensitivity Result: 23.9 ng/L (24 @ 06:04)  Troponin I, High Sensitivity Result: 20.7 ng/L (24 @ 23:25)  Thyroid Stimulating Hormone, Serum: 3.02 uIU/mL (23 @ 05:00)          TRANSTHORACIC ECHOCARDIOGRAM REPORT  ________________________________________________________________________________                                      _______       Pt. Name:       TRUDI COOLEY Study Date:    2023  MRN:            [id number]      YOB: 1950  Accession #:    [id number]     Age:           73 years  Account#:       [id number]    Gender:        M  Heart Rate:                   Height:        64.17 in (163.00 cm)  Rhythm:                       Weight:        189.59 lb (86.00 kg)  Blood Pressure: 136/61 mmHg   BSA/BMI:       1.92 m² / 32.37 kg/m²  ________________________________________________________________________________________  Referring Physician: [id number] Claudy Real  Primary Sonographer: Yessica Rdz RDCS    CPT:               ECHO TTE WO CON COMP W DOPP - 83714.m  Indication(s):     Dyspnea, unspecified - R06.00  Procedure:         Transthoracic echocardiogram with 2-D, M-mode and complete                     spectral and color flow Doppler.  Ordering Location: [facility]  Study Information: Image quality for this study is technically difficult.    _______________________________________________________________________________________     CONCLUSIONS:      1. Technically difficult image quality.   2. Left ventricular systolic function is low-normal with a LVEF estimated at 50-55%.   3. Normal right ventricular cavity size, wall thickness, and systolic function.   4. Mild mitral regurgitation.   5. Mild aortic regurgitation.   6. Tracetricuspid regurgitation.   7. Small pericardial effusion noted adjacent to the posterior left ventricle associated with a large anterior fat pad, without diastolic RV collapse.   8. The inferior vena cava is normal in size measuring 1.33 cm in diameter, (normal <2.1cm) with normal inspiratory collapse (normal >50%) consistent with normal right atrial pressure (~3, range 0-5mmHg).    ________________________________________________________________________________________  FINDINGS:     Left Ventricle:  Left ventricular systolic function is low normal with a calculated ejection fraction of 50-55% by the Burns's biplane method of disks. The left ventricular diastolic function is indeterminate.     Right Ventricle:  The right ventricular cavityis normal in size, normal wall thickness and normal systolic function.     Left Atrium:  The left atrium is normal.     Right Atrium:  The right atrium is normal in size with an indexed volume of 23.64 ml/m².     Aortic Valve:  The aortic valve anatomy cannot be determined with normal systolic excursion. There is moderate calcification of the aortic valve leaflets. There is no aortic valve stenosis. There is mild aortic regurgitation. AI VMax is 4.49 m/s. AI pressure half time is 602 msec. AI slope is 2.18 m/s².     Mitral Valve:  Structurally normal mitral valve with normal leaflet excursion. There is mild posterior calcification of the mitral valve annulus. There is mild mitral regurgitation.     Tricuspid Valve:  Structurally normal tricuspid valve with normal leaflet excursion. There is trace tricuspid regurgitation.     Pulmonic Valve:  The pulmonic valve was not well visualized.     Aorta:  The aortic root at the sinuses of Valsalva is normal in size, measuring 2.80 cm (indexed 1.46cm/m²). The aortic arch diameter is normal in size, measuring 2.2 cm (indexed 1.15 cm/m²).     Pericardium:  There is a small pericardial effusion noted adjacent to the posterior left ventricle.     Systemic Veins:  The inferior vena cava is normal in size measuring 1.33 cm in diameter, (normal <2.1cm) with normal inspiratory collapse (normal >50%) consistent with normal right atrial pressure (~3, range 0-5mmHg).  ____________________________________________________________________  QUANTITATIVE DATA:  Left Ventricle Measurements: (Indexed to BSA)     IVSd (2D):   1.1 cm  LVPWd (2D):  1.1 cm  LVIDd (2D):  4.9 cm  LVIDs (2D):  3.7 cm  LV Mass:     209 g  109.0 g/m²  LV Vol d, MOD A2C: 130.0 ml 67.85 ml/m²  LV Vol d, MOD A4C: 128.0 ml 66.81 ml/m²  LV Vol d, MOD BP:  129.5 ml 67.60 ml/m²  LV Vol s, MOD A2C: 63.4 ml  33.09 ml/m²  LV Vol s, MOD A4C: 67.1 ml  35.02 ml/m²  LV Vol s, MOD BP:  65.3 ml  34.06 ml/m²  LVOT SV MOD BP:    64.3 ml  LV EF% MOD BP:     50 %     MV E Vmax:    0.98 m/s  MV A Vmax:    0.39 m/s  MV E/A:       2.51  e' lateral:   9.14 cm/s  e' medial:    7.18 cm/s  E/e' lateral: 10.78  E/e' medial:  13.72  E/e' Average: 12.07  MV DT:        208 msec    Aorta Measurements: (normal range) (Indexed to BSA)     Sinuses of Valsalva: 2.80 cm (3.1 - 3.7 cm)  Ao Arch:             2.2 cm       Left Atrium Measurements: (Indexed to BSA)  LA Diam 2D: 3.50 cm    Right Atrial Measurements:     RA Vol:       45.30 ml  RA Vol Index: 23.64 ml/m²       LVOT / RVOT/ Qp/Qs Data: (Indexedto BSA)  LVOT Diameter: 2.10 cm    Aortic Valve Measurements:  AR Vmax  4.49 m/s  AR PHT   602 msec  AR [place] 2.18 m/s²    Mitral Valve Measurements:     MV E Vmax: 1.0 m/s         MR Vmax:          5.11 m/s  MV A Vmax: 0.4 m/s         MR Peak Gradient: 104.4 mmHg  MV E/A:    2.5       Tricuspid Valve Measurements:     RA Pressure: 3 mmHg    ________________________________________________________________________________________  Electronically signed on 2023 at 5:11:05 PM by Maggie Colin MD         *** Final ***

## 2024-03-13 NOTE — PATIENT CHOICE NOTE. - NSPTCHOICESTATE_GEN_ALL_CORE

## 2024-03-13 NOTE — ED ADULT NURSE REASSESSMENT NOTE - NS ED NURSE REASSESS COMMENT FT1
Pt wife came to RN station to advise pt is ahving cramps in LE and needs to stand. RN advised will be in shortly to assist. Upon entry into room pt found standing at bedside c/o dizziness and feels like he will pass out. Pt assisted to sit in chair at bedside. Pt reports dizziness still present and now nausea as well. Will admin zofran PRN. Primary RN notified.

## 2024-03-13 NOTE — H&P ADULT - HISTORY OF PRESENT ILLNESS
This is a 74yoM with a PMH of CAD sp PCI/ANAHI x 2 to LAD/Ramus 2016, MI 2016, HTN, HLD, AF on AC, CKD, who presents with SOB, JORDAN and orthopnea for last few days. HE has been taking his lasix. He denies chest pain, fevers, or cough.

## 2024-03-13 NOTE — CONSULT NOTE ADULT - SUBJECTIVE AND OBJECTIVE BOX
[facility]  Gene Asencio PA-C  [address] [place] [address]  [phone]      Chief Complaint:  Patient is a 74y old  Male who presents with a chief complaint of SOB (13 Mar 2024 07:38)      HPI:74 M with a PMH of CAD sp PCI/ANAHI x 2 to LAD/Ramus 2016, MI 2016, HTN, HLD, AF on AC, CKD, who presents with SOB, JORDAN and orthopnea for last few days. HE has been taking his Lasix. Pt reports that he started havinf some SOB and LE edema past 2 weeks. Also reports some orthopnea and GERD symptoms. He denies chest pain, fevers, or cough. Pt reports of having LE cramps, suddenly got up from bed, became dizzy,  felt like he will pass out and vomited x 1.    Allergies:  No Known Allergies      Medications:  acetaminophen     Tablet .. 650 milliGRAM(s) Oral every 6 hours PRN  aluminum hydroxide/magnesium hydroxide/simethicone Suspension 30 milliLiter(s) Oral every 4 hours PRN  aspirin enteric coated 81 milliGRAM(s) Oral daily  atorvastatin 40 milliGRAM(s) Oral at bedtime  carvedilol 25 milliGRAM(s) Oral every 12 hours  diltiazem    Tablet 30 milliGRAM(s) Oral every 6 hours  folic acid 1 milliGRAM(s) Oral daily  furosemide   Injectable 40 milliGRAM(s) IV Push every 12 hours  influenza  Vaccine (HIGH DOSE) 0.7 milliLiter(s) IntraMuscular once  melatonin 3 milliGRAM(s) Oral at bedtime PRN  ondansetron Injectable 4 milliGRAM(s) IV Push every 6 hours PRN  pantoprazole  Injectable 40 milliGRAM(s) IV Push every 12 hours  rivaroxaban 15 milliGRAM(s) Oral with dinner  sucralfate 1 Gram(s) Oral every 6 hours  tamsulosin 0.4 milliGRAM(s) Oral at bedtime      PMHX/PSHX:  Essential hypertension    Gastroesophageal reflux disease, esophagitis presence not specified    HTN (hypertension)    Hyperlipidemia    CAD (coronary artery disease)    No significant past surgical history    No significant past surgical history    CAD S/P percutaneous coronary angioplasty        Family history:  Family history of heart disease    No pertinent family history in first degree relatives    FH: heart disease        Social History:     ROS:     General:  no fevers, chills, night sweats, fatigue,   Eyes:  Good vision, no reported pain  ENT:  No sore throat, pain, runny nose, dysphagia  CV:  No pain, palpitations, hypo/hypertension  Resp:  No dyspnea, cough, tachypnea, wheezing  GI:  No pain, No nausea, No vomiting, No diarrhea, No constipation, No weight loss, No fever, No pruritis, No rectal bleeding, No tarry stools, No dysphagia,  :  No pain, bleeding, incontinence, nocturia  Muscle:  No pain, weakness  Neuro:  No weakness, tingling, memory problems  Psych:  No fatigue, insomnia, mood problems, depression  Endocrine:  No polyuria, polydipsia, cold/heat intolerance  Heme:  No petechiae, ecchymosis, easy bruisability  Skin:  No rash, tattoos, scars, edema      PHYSICAL EXAM:   Vital Signs:  Vital Signs Last 24 Hrs  T(C): 36.4 (13 Mar 2024 09:05), Max: 36.7 (13 Mar 2024 01:49)  T(F): 97.6 (13 Mar 2024 09:05), Max: 98.1 (13 Mar 2024 04:55)  HR: 73 (13 Mar 2024 09:05) (73 - 112)  BP: 114/81 (13 Mar 2024 09:05) (114/81 - 163/83)  BP(mean): --  RR: 17 (13 Mar 2024 09:05) (17 - 20)  SpO2: 94% (13 Mar 2024 09:05) (90% - 96%)    Parameters below as of 13 Mar 2024 09:05  Patient On (Oxygen Delivery Method): nasal cannula  O2 Flow (L/min): 2    Daily Height in cm: 162.56 (12 Mar 2024 22:25)    Daily     GENERAL:  Appears stated age,   HEENT:  NC/AT,    CHEST:  Full & symmetric excursion,   HEART:  Regular rhythm  ABDOMEN:  Soft, non-tender, non-distended,   EXTEREMITIES:  no cyanosis,clubbing or edema  SKIN:  No rash  NEURO:  Alert,    LABS:                        13.4   11.15 )-----------( 250      ( 13 Mar 2024 06:04 )             41.7     03-13    140  |  108  |  36<H>  ----------------------------<  134<H>  3.8   |  22  |  2.00<H>    Ca    8.7      13 Mar 2024 06:04  Mg     2.1     03-13    TPro  7.3  /  Alb  3.7  /  TBili  0.5  /  DBili  x   /  AST  30  /  ALT  37  /  AlkPhos  109  03-12    LIVER FUNCTIONS - ( 12 Mar 2024 23:25 )  Alb: 3.7 g/dL / Pro: 7.3 g/dL / ALK PHOS: 109 U/L / ALT: 37 U/L / AST: 30 U/L / GGT: x             Urinalysis Basic - ( 13 Mar 2024 06:04 )    Color: x / Appearance: x / SG: x / pH: x  Gluc: 134 mg/dL / Ketone: x  / Bili: x / Urobili: x   Blood: x / Protein: x / Nitrite: x   Leuk Esterase: x / RBC: x / WBC x   Sq Epi: x / Non Sq Epi: x / Bacteria: x          Imaging:

## 2024-03-13 NOTE — PHYSICAL THERAPY INITIAL EVALUATION ADULT - PERTINENT HX OF CURRENT PROBLEM, REHAB EVAL
73 yo M adm 3/13 with a PMH of CAD sp PCI/ANAHI x 2 to LAD/Ramus 2016, MI 2016, HTN, HLD, AF on AC, CKD, who presents with SOB, JORDAN and orthopnea for last few days. HE has been taking his lasix. He denies chest pain, fevers, or cough.

## 2024-03-13 NOTE — CARE COORDINATION ASSESSMENT. - NSCAREPROVIDERS_GEN_ALL_CORE_FT
CARE PROVIDERS:  Accepting Physician: Claudy Real  Administration: Giovanny Kasper  Administration: Vanessa Espinal  Admitting: Claudy Real  Attending: Claudy Real  Cardiology Technician: Javed Lawrence  Case Management: Matthew Rubalcava  Case Management: Audrey Whitehead  Consultant: Sushma Lilly  Consultant: Ezekiel Gillespie  Consultant: Rebeca Mercado  Covering Team: Danilo Mooney  Covering Team: Ronaldo Bentley  ED Attending: Mala Ward  ED Nurse: Stacey Cornejo  ED Nurse 2: Jayden Robledo  Emergency Medicine: Mala Ward  Infection Control: Zink, Corinne  Nurse: Lia Vee  Nurse: Darcy Burns  Nurse: Stuart Fritz  Nurse: Jayden Robledo  Nurse: Rach Jean  Nurse: Blanca Galindo  Ordered: ServiceAccount, SCMMLM  Ordered: ADM, User  Outpatient Provider: Ezekiel Gillespie  Outpatient Provider: Amico, Frank  Override: Rach Jean  PCA/Nursing Assistant: Jonny Adames  PCA/Nursing Assistant: Marquita Cobb  Primary Team: Blanca Brito  Respiratory Therapy: Isaac Fleming  : Florinda Finley  : Purnima Mantilla  Team: Ushahidi TCM, Team  UR// Supp. Assoc.: Rosa Hurt  UR// Supp. Assoc.: Lenka Monzon

## 2024-03-13 NOTE — CHART NOTE - NSCHARTNOTEFT_GEN_A_CORE
Called by RN regarding patients medications   Patient takes Carvedilol 25 mg BID, Aspirin 81 mg daily, Folic acid 1 mg daily, Hydralazine 25 mg BID, Xarelto 15 mg daily, Atorvastatin 40 mg daily   Patient no longer takes Atenolol and Amlodipine  Med reconciliation adjusted accordingly and current home meds continued

## 2024-03-13 NOTE — PATIENT PROFILE ADULT - FALL HARM RISK - RISK INTERVENTIONS

## 2024-03-13 NOTE — PATIENT CHOICE NOTE. - NSPTCHOICENOTES_GEN_ALL_CORE
CM spoke with patient to discuss home care agency choices. Patient undecided at this time. Resource folder left at bedside. CM to follow up.

## 2024-03-13 NOTE — CARE COORDINATION ASSESSMENT. - NSDCPLANSERVICES_GEN_ALL_CORE
CM spoke with patient to discuss home care agency choices. Patient undecided at this time. Resource folder left at bedside. CM to follow up./Anticipated Needs Unclear at Present

## 2024-03-13 NOTE — H&P ADULT - PROBLEM SELECTOR PLAN 1
Admit  Start iv lasix 40mg q12h  Strict I/Os  Repeat ECHO  Continue Coreg  Cardio consult  Further work-up/management pending clinical course.

## 2024-03-13 NOTE — CONSULT NOTE ADULT - SUBJECTIVE AND OBJECTIVE BOX
Date/Time Patient Seen:  		  Referring MD:   Data Reviewed	       Patient is a 74y old  Male who presents with a chief complaint of SOB (13 Mar 2024 14:16)      Subjective/HPI   74yoM with a PMH of CAD sp PCI/ANAHI x 2 to LAD/Ramus 2016, MI 2016, HTN, HLD, AF on AC, CKD, who presents with SOB, CM and orthopnea  PAST MEDICAL & SURGICAL HISTORY:  Essential hypertension    Gastroesophageal reflux disease, esophagitis presence not specified    HTN (hypertension)    Hyperlipidemia    CAD (coronary artery disease)  stents x 2    No significant past surgical history    No significant past surgical history    CAD S/P percutaneous coronary angioplasty  with ANAHI      Patient History:    Past Medical, Past Surgical, and Family History:  PAST MEDICAL HISTORY:  CAD (coronary artery disease) stents x 2    Essential hypertension     Gastroesophageal reflux disease, esophagitis presence not specified     HTN (hypertension)     Hyperlipidemia.     PAST SURGICAL HISTORY:  CAD S/P percutaneous coronary angioplasty with ANAHI.     FAMILY HISTORY:  FH: heart disease, father and mother .     Social History:  · Substance use	No     Tobacco Screening:  · Core Measure Site	Yes  · Has the patient used tobacco in the past 30 days?	No    Risk Assessment:    Present on Admission:  Deep Venous Thrombosis	no  Pulmonary Embolus	no     HIV Screening:  · In accordance with [place] State law, we offer every patient who comes to our ED an HIV test. Would you like to be tested today?	Opt out        Medication list         MEDICATIONS  (STANDING):  aspirin enteric coated 81 milliGRAM(s) Oral daily  atorvastatin 40 milliGRAM(s) Oral at bedtime  carvedilol 25 milliGRAM(s) Oral every 12 hours  diltiazem    Tablet 30 milliGRAM(s) Oral every 6 hours  folic acid 1 milliGRAM(s) Oral daily  furosemide   Injectable 40 milliGRAM(s) IV Push every 12 hours  influenza  Vaccine (HIGH DOSE) 0.7 milliLiter(s) IntraMuscular once  pantoprazole  Injectable 40 milliGRAM(s) IV Push every 12 hours  rivaroxaban 15 milliGRAM(s) Oral with dinner  sucralfate 1 Gram(s) Oral every 6 hours  tamsulosin 0.4 milliGRAM(s) Oral at bedtime    MEDICATIONS  (PRN):  acetaminophen     Tablet .. 650 milliGRAM(s) Oral every 6 hours PRN Temp greater or equal to 38C (100.4F), Mild Pain (1 - 3)  aluminum hydroxide/magnesium hydroxide/simethicone Suspension 30 milliLiter(s) Oral every 4 hours PRN Dyspepsia  melatonin 3 milliGRAM(s) Oral at bedtime PRN Insomnia  ondansetron Injectable 4 milliGRAM(s) IV Push every 6 hours PRN Nausea and/or Vomiting         Vitals log        ICU Vital Signs Last 24 Hrs  T(C): 36.4 (13 Mar 2024 09:05), Max: 36.7 (13 Mar 2024 01:49)  T(F): 97.6 (13 Mar 2024 09:05), Max: 98.1 (13 Mar 2024 04:55)  HR: 73 (13 Mar 2024 09:05) (73 - 112)  BP: 114/81 (13 Mar 2024 09:05) (114/81 - 163/83)  BP(mean): --  ABP: --  ABP(mean): --  RR: 17 (13 Mar 2024 09:05) (17 - 20)  SpO2: 94% (13 Mar 2024 09:05) (90% - 96%)    O2 Parameters below as of 13 Mar 2024 09:05  Patient On (Oxygen Delivery Method): nasal cannula  O2 Flow (L/min): 2               Input and Output:  I&O's Detail      Lab Data                        13.4   11.15 )-----------( 250      ( 13 Mar 2024 06:04 )             41.7     03-13    140  |  108  |  36<H>  ----------------------------<  134<H>  3.8   |  22  |  2.00<H>    Ca    8.7      13 Mar 2024 06:04  Mg     2.1     03-13    TPro  7.3  /  Alb  3.7  /  TBili  0.5  /  DBili  x   /  AST  30  /  ALT  37  /  AlkPhos  109  03-12      CARDIAC MARKERS ( 13 Mar 2024 06:04 )  x     / x     / 81 U/L / x     / 1.2 ng/mL        Review of Systems	  sob  cm  weakness      Objective     Physical Examination        Pertinent Lab findings & Imaging      Barnes:  NO   Adequate UO     I&O's Detail           Discussed with:     Cultures:	        Radiology      ACC: [id number] EXAM:  CT CHEST   ORDERED BY: DAGMAR HARO     PROCEDURE DATE:  2023          INTERPRETATION:  CLINICAL INFORMATION: Shortness of breath    COMPARISON: None.    CONTRAST/COMPLICATIONS:  IV Contrast: NONE  Oral Contrast: NONE  Complications: None reported at time of study completion    PROCEDURE:  CT of the Chest was performed.  Sagittal and coronal reformats were performed.    FINDINGS:    LUNGS AND AIRWAYS: Patent central airways. Heterogeneously distributed   groundglass attenuation throughout the bilateral lung fields producing a   mosaic pattern of attenuation. Differential diagnosis includes pulmonary   edema and inflammatory/infectious etiologies. No javad consolidation.   Small bilateral layering effusions with dependent atelectatic changes.  PLEURA: As above  MEDIASTINUM AND EVENS: No lymphadenopathy. Multinodular thyroid. Recommend   ultrasound correlate.  VESSELS: Within normal limits.  HEART: Heart size is normal. Coronary artery calcification. Small to   moderate pericardial effusion measuring simple fluid.  CHEST WALL AND LOWER NECK: Mild bilateral gynecomastia.  VISUALIZED UPPER ABDOMEN: Small hiatal hernia.  BONES: Degenerative changes.    IMPRESSION:  Nonspecific bilateral groundglass parenchymal opacification.  Differential diagnosis includes pulmonary edema and   inflammatory/infectious etiologies.    Small bilateral pleural effusions with dependent atelectasis.    Small-to-moderate pericardial effusion    Multinodular thyroid. Recommend ultrasound correlation        --- End of Report ---            ROSE CIFUENTES MD; Attending Radiologist  This document has been electronically signed. Dec 22 2023  4:16PM

## 2024-03-13 NOTE — CARE COORDINATION ASSESSMENT. - OTHER PERTINENT DISCHARGE PLANNING INFORMATION:
CM met with patient and spouse (Junie) at bedside to explain role and transitions of care. Patient lives with spouse in a private house with 3 steps to get in and 13 to the second floor.  Patient was fully independent prior to admission.  No caregiver identified, no home care or DMEs.  Family will transport patient home when ready to be discharged.  No needs identified.  CM explained  home care expectation, process, insurance provision and home safety with good understanding. CM to make referral. Patient  verbalized understanding of plans after discharge and is in agreement.  Resource folder left at bedside.  All questions answered to the best of my abilities.  CM remains available throughout the hospital stay.

## 2024-03-13 NOTE — PHYSICAL THERAPY INITIAL EVALUATION ADULT - ADDITIONAL COMMENTS
Pt lives w/ his spouse in a house, + steps. Pt is an independent ambulator without device and independent with ADLs. + driving

## 2024-03-13 NOTE — CONSULT NOTE ADULT - NS ATTEND AMEND GEN_ALL_CORE FT
74yM with a PMH of CAD sp PCI/ANAHI x 2 to LAD/Ramus 2016, MI 2016, HTN, HLD, AF on AC, CKD, who presents with SOB, JORDAN and orthopnea, admitted with acute on chr CHF exacerbation.     SOB, JORDAN and orthopnea with elev bnp and hf on cxr/ct  lasix recently held for worse creat  cont iv lasix  ECHO 2/23/24 showed normal LV & RV size and function EF 57 %, increased LV mass and concentric hypertrophy, severely dilated LA, Mod MR, mild AR, Small pericardial effusion, no need to repeat  af accel vr on tele  add dilt to coreg 25 bid  cont ac  known cad without acute ischemia 74yM with a PMH of CAD sp PCI/ANAHI x 2 to LAD/Ramus 2016, MI 2016, HTN, HLD, AF on AC, CKD, who presents with SOB, JORDAN and orthopnea, admitted with acute on chr CHF exacerbation.     SOB, JORDAN and orthopnea with elev bnp and hf on cxr/ct  lasix recently held for worse creat  cont iv lasix  ECHO 2/23/24 showed normal LV & RV size and function EF 57 %, increased LV mass and concentric hypertrophy, severely dilated LA, Mod MR, mild AR, Small pericardial effusion, no need to repeat  af accel vr on tele  has gen been in sr though at office visit last week was in af 100  recurrent af might be resp for his hf  add dilt to coreg 25 bid  cont ac  might need to consider restoration of sr to avoid hf, pending clinical course  known cad without acute ischemia

## 2024-03-13 NOTE — CONSULT NOTE ADULT - ASSESSMENT
74yM with a PMH of CAD sp PCI/ANAHI x 2 to LAD/Ramus 2016, MI 2016, HTN, HLD, AF on AC, CKD, who presents with SOB, JORDAN and orthopnea, admitted with acute on chr CHF exacerbation.     Acute on chronic CHF exacerbation, CAD, stents, HTN, HLD, AF on AC  - Pt p/w SOB, JORDAN and orthopnea  - BNP 1065  - CXR w/ diffuse moderate CHF   - CT Chest nonspecific bilateral groundglass parenchymal opacification. Differential diagnosis includes pulmonary edema and inflammatory/infectious etiologies. Small bilateral pleural effusions with dependent atelectasis. Small-to-moderate pericardial effusion  - BUN 36, Creat 2.00  - Continue Lasix 40 mg IV BID    - Known A fib, on AC  - Telemetry with   - Continue AC with Xarelto  - Continue Coreg     - Known CAD sp PCI/ANAHI x 2 to LAD/Ramus 2016, MI 2016  - ELG showed A fib @ 122.   - Troponin: <-23.9, <-20.7  - No evidence of any active ischemia   - Continue aspirin and statin     - BP:  (134/88 - 163/83)  - Continue Hydralazine  74yM with a PMH of CAD sp PCI/ANAHI x 2 to LAD/Ramus 2016, MI 2016, HTN, HLD, AF on AC, CKD, who presents with SOB, JORDAN and orthopnea, admitted with acute on chr CHF exacerbation.     Acute on chronic CHF exacerbation, CAD, stents, HTN, HLD, AF on AC  - Pt p/w SOB, JORDAN and orthopnea  - BNP 1065  - CXR w/ diffuse moderate CHF   - CT Chest nonspecific bilateral ground glass parenchymal opacification. Differential diagnosis includes pulmonary edema and inflammatory/infectious etiologies. Small bilateral pleural effusions with dependent atelectasis. Small-to-moderate pericardial effusion  - ECHO 2/23/24 showed normal LV & RV size and function EF 57 %, increased LV mass and concentric hypertrophy, severely dilated LA, Mod MR, mild AR, Small pericardial effusion  - Vol ol on examination with orthopnea, JORDAN and LE edema   - BUN 36, Creat 2.00  - Continue Lasix 40 mg IV BID    - Known A fib, on AC  - Telemetry with A fib 110-130s   - Continue Coreg 25 mg PO BID  - Would add Cardizem 30 mg Po Q6H. Give Cardizem 10 mg Iv x 1 now   - Continue AC with Xarelto    - Known CAD sp PCI/ANAHI x 2 to LAD/Ramus 2016, MI 2016  - ELG showed A fib @ 122.   - Troponin: <-23.9, <-20.7  - No evidence of any active ischemia   - Continue aspirin and statin     - BP:  (134/88 - 163/83)  - Hold Hydralazine to allow room for AV nodals     - Monitor and replete lytes, keep K>4, Mg>2.  - Will continue to follow.    Sushma Lilly, MS FNP, AGACNP  Nurse Practitioner- Cardiology   Please call on TEAMS       74yM with a PMH of CAD sp PCI/ANAHI x 2 to LAD/Ramus 2016, MI 2016, HTN, HLD, AF on AC, CKD, who presents with SOB, JORDAN and orthopnea, admitted with acute on chr CHF exacerbation.     Acute on chronic CHF exacerbation, CAD, stents, HTN, HLD, AF on AC  - Pt p/w SOB, JORDAN and orthopnea  - BNP 1065  - CXR w/ diffuse moderate CHF   - CT Chest nonspecific bilateral ground glass parenchymal opacification. Differential diagnosis includes pulmonary edema and inflammatory/infectious etiologies. Small bilateral pleural effusions with dependent atelectasis. Small-to-moderate pericardial effusion  - ECHO 2/23/24 showed normal LV & RV size and function EF 57 %, increased LV mass and concentric hypertrophy, severely dilated LA, Mod MR, mild AR, Small pericardial effusion  - Vol ol on examination with orthopnea, JORDAN and LE edema   - He as on Lasix outpatient, but was stopped by PMD as his creatinine increased   - BUN 36, Creat 2.00  - Continue Lasix 40 mg IV BID    - Known A fib, on AC  - Telemetry with A fib 110-130s   - Continue Coreg 25 mg PO BID  - Would add Cardizem 30 mg Po Q6H. Give Cardizem 10 mg Iv x 1 now   - Continue AC with Xarelto    - Known CAD sp PCI/ANAHI x 2 to LAD/Ramus 2016, MI 2016  - ELG showed A fib @ 122.   - Troponin: <-23.9, <-20.7  - No evidence of any active ischemia   - Continue aspirin and statin     - BP:  (134/88 - 163/83)  - Hold Hydralazine to allow room for AV nodals     - Monitor and replete lytes, keep K>4, Mg>2.  - Will continue to follow.    Sushma Lilly, MS FNP, AGACNP  Nurse Practitioner- Cardiology   Please call on TEAMS       74yM with a PMH of CAD sp PCI/ANAHI x 2 to LAD/Ramus 2016, MI 2016, HTN, HLD, AF on AC, CKD, who presents with SOB, JORDAN and orthopnea, admitted with acute on chr CHF exacerbation.     Acute on chronic CHF exacerbation, CAD, stents, HTN, HLD, PAF on AC  - Pt p/w SOB, JORDAN and orthopnea  - BNP 1065  - CXR w/ diffuse moderate CHF   - CT Chest nonspecific bilateral ground glass parenchymal opacification. Differential diagnosis includes pulmonary edema and inflammatory/infectious etiologies. Small bilateral pleural effusions with dependent atelectasis. Small-to-moderate pericardial effusion  - ECHO 2/23/24 showed normal LV & RV size and function EF 57 %, increased LV mass and concentric hypertrophy, severely dilated LA, Mod MR, mild AR, Small pericardial effusion  - Vol ol on examination with orthopnea, JORDAN and LE edema   - He as on Lasix outpatient, but was stopped by PMD as his creatinine increased   - though has hx of paf has been gen in sr, and loss of av synchrony might have resulted in hf  - BUN 36, Creat 2.00  - Continue Lasix 40 mg IV BID    - Known parox A fib, on AC  - Telemetry with A fib 110-130s   -has gen been in sr though was in af with hr 100 last week  -this may be the trigger for his hf  - Continue Coreg 25 mg PO BID  - Would add Cardizem 30 mg Po Q6H. Give Cardizem 10 mg Iv x 1 now   - Continue AC with Xarelto    - Known CAD sp PCI/ANAHI x 2 to LAD/Ramus 2016, MI 2016  - ELG showed A fib @ 122.   - Troponin: <-23.9, <-20.7  - No evidence of any active ischemia   - Continue aspirin and statin     - BP:  (134/88 - 163/83)  - Hold Hydralazine to allow room for AV nodals     - Monitor and replete lytes, keep K>4, Mg>2.  - Will continue to follow.    Sushma Lilly, MS FNP, AGACNP  Nurse Practitioner- Cardiology   Please call on TEAMS

## 2024-03-13 NOTE — INPATIENT CERTIFICATION FOR MEDICARE PATIENTS - CURRENT MEDICAL NEEDS AND CARE PLANS
Subjective   Mr. Jose Luis Reilly is a 59 y.o. male who presents to the ED with c/o syncope. He reports this evening while at dinner his wife suffered a syncopal episode and while she was being tended to he began to feel light-headed so he sat down and had a syncopal episode. He does not have memory of the event. He reports his blood pressure was 90/60 when he regained consciousness. His baseline heart rate is in the 70s and his baseline blood pressure is 130/80. He denies chest pain, shortness of breath, nausea, vomiting, diarrhea, constipation, and black or bloody stool, and notes he has felt normal recently. He denies a surgical or medical history. There are no other acute symptoms at this time.        History provided by:  Patient  Syncope   Episode history:  Single  Most recent episode:  Today  Progression:  Resolved  Chronicity:  New  Context: standing up    Witnessed: yes    Relieved by:  None tried  Worsened by:  Nothing  Ineffective treatments:  None tried  Associated symptoms: no chest pain, no nausea, no shortness of breath and no vomiting        Review of Systems   Respiratory: Negative for shortness of breath.    Cardiovascular: Positive for syncope. Negative for chest pain.   Gastrointestinal: Negative for blood in stool, constipation, diarrhea, nausea and vomiting.   Genitourinary: Negative for dysuria and frequency.   Neurological: Positive for syncope.   All other systems reviewed and are negative.      History reviewed. No pertinent past medical history.    No Known Allergies    Past Surgical History:   Procedure Laterality Date   • EYE SURGERY     • TONSILLECTOMY         History reviewed. No pertinent family history.    Social History     Socioeconomic History   • Marital status:      Spouse name: Not on file   • Number of children: Not on file   • Years of education: Not on file   • Highest education level: Not on file   Tobacco Use   • Smoking status: Never Smoker   Substance and Sexual  Activity   • Alcohol use: No     Frequency: Never   • Drug use: No         Objective   Physical Exam   Constitutional: He is oriented to person, place, and time. He appears well-developed and well-nourished. No distress.   HENT:   Head: Normocephalic and atraumatic.   Nose: Nose normal.   Eyes: Conjunctivae are normal. No scleral icterus.   Neck: Normal range of motion. Neck supple.   Cardiovascular: Normal rate, regular rhythm and normal heart sounds.   No murmur heard.  Pulmonary/Chest: Effort normal and breath sounds normal. No respiratory distress.   Abdominal: Soft. Bowel sounds are normal. There is no tenderness.   Musculoskeletal: Normal range of motion. He exhibits no edema.   Neurological: He is alert and oriented to person, place, and time.   Skin: Skin is warm and dry.   Psychiatric: He has a normal mood and affect. His behavior is normal.   Nursing note and vitals reviewed.      Procedures         ED Course     Recent Results (from the past 24 hour(s))   Urinalysis With Culture If Indicated - Urine, Clean Catch    Collection Time: 12/31/18 10:30 PM   Result Value Ref Range    Color, UA Yellow Yellow, Straw    Appearance, UA Clear Clear    pH, UA 5.5 5.0 - 8.0    Specific Gravity, UA 1.025 1.001 - 1.030    Glucose, UA Negative Negative    Ketones, UA Negative Negative    Bilirubin, UA Negative Negative    Blood, UA Negative Negative    Protein, UA Negative Negative    Leuk Esterase, UA Negative Negative    Nitrite, UA Negative Negative    Urobilinogen, UA 0.2 E.U./dL 0.2 - 1.0 E.U./dL   Magnesium    Collection Time: 12/31/18 10:44 PM   Result Value Ref Range    Magnesium 1.7 1.3 - 2.7 mg/dL   Light Blue Top    Collection Time: 12/31/18 10:44 PM   Result Value Ref Range    Extra Tube hold for add-on    Green Top (Gel)    Collection Time: 12/31/18 10:44 PM   Result Value Ref Range    Extra Tube Hold for add-ons.    Lavender Top    Collection Time: 12/31/18 10:44 PM   Result Value Ref Range    Extra Tube  Possible Home hold for add-on    Gold Top - SST    Collection Time: 12/31/18 10:44 PM   Result Value Ref Range    Extra Tube Hold for add-ons.    Comprehensive Metabolic Panel    Collection Time: 12/31/18 10:44 PM   Result Value Ref Range    Glucose 117 (H) 70 - 100 mg/dL    BUN 19 9 - 23 mg/dL    Creatinine 1.02 0.60 - 1.30 mg/dL    Sodium 137 132 - 146 mmol/L    Potassium 3.5 3.5 - 5.5 mmol/L    Chloride 105 99 - 109 mmol/L    CO2 22.0 20.0 - 31.0 mmol/L    Calcium 8.5 (L) 8.7 - 10.4 mg/dL    Total Protein 6.0 5.7 - 8.2 g/dL    Albumin 4.02 3.20 - 4.80 g/dL    ALT (SGPT) 19 7 - 40 U/L    AST (SGOT) 25 0 - 33 U/L    Alkaline Phosphatase 61 25 - 100 U/L    Total Bilirubin 0.4 0.3 - 1.2 mg/dL    eGFR Non African Amer 75 >60 mL/min/1.73    Globulin 2.0 gm/dL    A/G Ratio 2.0 1.5 - 2.5 g/dL    BUN/Creatinine Ratio 18.6 7.0 - 25.0    Anion Gap 10.0 3.0 - 11.0 mmol/L   CBC Auto Differential    Collection Time: 12/31/18 10:44 PM   Result Value Ref Range    WBC 7.18 3.50 - 10.80 10*3/mm3    RBC 4.17 (L) 4.20 - 5.76 10*6/mm3    Hemoglobin 12.6 (L) 13.1 - 17.5 g/dL    Hematocrit 38.1 (L) 38.9 - 50.9 %    MCV 91.4 80.0 - 99.0 fL    MCH 30.2 27.0 - 31.0 pg    MCHC 33.1 32.0 - 36.0 g/dL    RDW 12.3 11.3 - 14.5 %    RDW-SD 41.3 37.0 - 54.0 fl    MPV 9.8 6.0 - 12.0 fL    Platelets 233 150 - 450 10*3/mm3    Neutrophil % 62.7 41.0 - 71.0 %    Lymphocyte % 24.0 24.0 - 44.0 %    Monocyte % 10.0 0.0 - 12.0 %    Eosinophil % 2.9 0.0 - 3.0 %    Basophil % 0.4 0.0 - 1.0 %    Immature Grans % 0.1 0.0 - 0.6 %    Neutrophils, Absolute 4.50 1.50 - 8.30 10*3/mm3    Lymphocytes, Absolute 1.72 0.60 - 4.80 10*3/mm3    Monocytes, Absolute 0.72 0.00 - 1.00 10*3/mm3    Eosinophils, Absolute 0.21 0.00 - 0.30 10*3/mm3    Basophils, Absolute 0.03 0.00 - 0.20 10*3/mm3    Immature Grans, Absolute 0.01 0.00 - 0.03 10*3/mm3     Note: In addition to lab results from this visit, the labs listed above may include labs taken at another facility or during a different  encounter within the last 24 hours. Please correlate lab times with ED admission and discharge times for further clarification of the services performed during this visit.    XR Chest 1 View    (Results Pending)     Vitals:    12/31/18 2300 12/31/18 2301 12/31/18 2302 12/31/18 2303   BP: 115/77      BP Location:       Patient Position:       Pulse: 59 63 65 85   Resp:       Temp:       TempSrc:       SpO2: 97% 97% 97% 96%   Weight:       Height:         Medications   sodium chloride 0.9 % flush 10 mL (not administered)   sodium chloride 0.9 % flush 10 mL (not administered)   sodium chloride 0.9 % bolus 1,000 mL (0 mL Intravenous Stopped 12/31/18 2337)     ECG/EMG Results (last 24 hours)     Procedure Component Value Units Date/Time    ECG 12 Lead [[id number]] Collected:  12/31/18 2209     Updated:  12/31/18 2233    Narrative:       Test Reason : Syncope triage protocol  Blood Pressure : **/** mmHG  Vent. Rate : 063 BPM     Atrial Rate : 063 BPM     P-R Int : 178 ms          QRS Dur : 110 ms      QT Int : 418 ms       P-R-T Axes : 050 065 036 degrees     QTc Int : 427 ms    Normal sinus rhythm  Normal ECG  No previous ECGs available  Confirmed by MILTON HOOKER ([id number]) on 12/31/2018 10:33:27 PM    Referred By:  KARLA MARTINS           Confirmed By:MILTON HOOKER                        [facility]  Number of Diagnoses or Management Options  Syncope, unspecified syncope type: new and requires workup  Diagnosis management comments: O acute abdomen eyes identified on laboratory or imaging workup.    Patient has remained stable throughout the ER course with normal and stable vital signs, and normal mentation.    Discharged home appearing well.    He is advised to follow-up primary care physician for repeat evaluation within the next week.       Amount and/or Complexity of Data Reviewed  Clinical lab tests: ordered and reviewed  Tests in the radiology section of CPT®: ordered and reviewed  Obtain history from someone other than the  patient: yes  Review and summarize past medical records: yes  Independent visualization of images, tracings, or specimens: yes        Final diagnoses:   Syncope, unspecified syncope type       Documentation assistance provided by martha Aguero.  Information recorded by the scribe was done at my direction and has been verified and validated by me.     Carlitos Aguero  12/31/18 4100       Bert Gaming MD  01/01/19 011

## 2024-03-13 NOTE — CONSULT NOTE ADULT - ASSESSMENT
74yoM with a PMH of CAD sp PCI/ANAHI x 2 to LAD/Ramus 2016, MI 2016, HTN, HLD, AF on AC, CKD, who presents with SOB, JORDAN and orthopnea    pericardial eff  ckd  anemia  OP  OA  pleural eff  atelectasis  dyspepsia  cad  HTN  HLD  AF    ECHO 2/23/24 showed normal LV & RV size and function EF 57 %, increased LV mass and concentric hypertrophy, severely dilated LA, Mod MR, mild AR, Small pericardial effusion  cardio eval noted  GI eval noted  cxr and TTE and CT chest reviewed  monitor VS and HD and Sat  Diuresis in Progress  I and O  replete lytes  serial renal indices  dietary discretion  on DOAC for AF  AF - rate and rhythm control

## 2024-03-13 NOTE — CONSULT NOTE ADULT - ASSESSMENT
chf exacerbation  dyspepsia    cont reg diet  proton pump inhibitor bid  carafate 1g four times a day  monitor po intake  monitor GI fn  will need outpatient  upper gastrointestinal endoscopy +/- colonoscopy  d/w patient

## 2024-03-13 NOTE — H&P ADULT - PROBLEM SELECTOR PLAN 2
Continue Coreg, hydralazine Likely 2/2 to fluid overload  Continue iv lasix  Oxygen prn  Pulmonary consult

## 2024-03-13 NOTE — GOALS OF CARE CONVERSATION - ADVANCED CARE PLANNING - CONVERSATION DETAILS
The patient is a 41y Female complaining of fall. [facility]-Palliative care SW met with patient at bedside. Reviewed patient's medical and social history as well as events leading to patient's hospitalization. Writer discussed patient's current diagnosis (chronic pulonary edema, CAD sp PCI/ANAHI x 2 to LAD/Ramus 2016, MI 2016, HTN, HLD, AF on AC, CKD), medical condition and management. Inquired about adavnced directives. Patient states that he has a HCP with his wife Junie as health care agent. Discussed with patient his wishes regarding cardiopulmonary resuscitation and mechanical ventilation/intubation. Patient states that he has not thought about these things and has not had any conversations with his spouse. Writer recommended patient discuss his wishes with his health care agent. Patient showed insight into medical condition. All questions answered. Psychosocial support provided.

## 2024-03-13 NOTE — PHARMACOTHERAPY INTERVENTION NOTE - COMMENTS
Medication reconciliation completed with patient at bedside and with wife over the phone. Confirmed patient's anticoagulant regimen, on Xarelto 20 mg daily outpatient. Relayed findings to Attending Dr. LUBA Mooney. MD made aware, will review and re-order per clinical judgement.

## 2024-03-14 DIAGNOSIS — N17.9 ACUTE KIDNEY FAILURE, UNSPECIFIED: ICD-10-CM

## 2024-03-14 DIAGNOSIS — K21.9 GASTRO-ESOPHAGEAL REFLUX DISEASE WITHOUT ESOPHAGITIS: ICD-10-CM

## 2024-03-14 LAB
ANION GAP SERPL CALC-SCNC: 10 MMOL/L — SIGNIFICANT CHANGE UP (ref 5–17)
BUN SERPL-MCNC: 38 MG/DL — HIGH (ref 7–23)
CALCIUM SERPL-MCNC: 8.6 MG/DL — SIGNIFICANT CHANGE UP (ref 8.5–10.1)
CHLORIDE SERPL-SCNC: 104 MMOL/L — SIGNIFICANT CHANGE UP (ref 96–108)
CO2 SERPL-SCNC: 26 MMOL/L — SIGNIFICANT CHANGE UP (ref 22–31)
CREAT SERPL-MCNC: 2.4 MG/DL — HIGH (ref 0.5–1.3)
EGFR: 28 ML/MIN/1.73M2 — LOW
GLUCOSE SERPL-MCNC: 123 MG/DL — HIGH (ref 70–99)
HCT VFR BLD CALC: 40.3 % — SIGNIFICANT CHANGE UP (ref 39–50)
HGB BLD-MCNC: 12.8 G/DL — LOW (ref 13–17)
MAGNESIUM SERPL-MCNC: 2.1 MG/DL — SIGNIFICANT CHANGE UP (ref 1.6–2.6)
MCHC RBC-ENTMCNC: 23.5 PG — LOW (ref 27–34)
MCHC RBC-ENTMCNC: 31.8 GM/DL — LOW (ref 32–36)
MCV RBC AUTO: 74.1 FL — LOW (ref 80–100)
NRBC # BLD: 0 /100 WBCS — SIGNIFICANT CHANGE UP (ref 0–0)
PLATELET # BLD AUTO: 236 K/UL — SIGNIFICANT CHANGE UP (ref 150–400)
POTASSIUM SERPL-MCNC: 3.6 MMOL/L — SIGNIFICANT CHANGE UP (ref 3.5–5.3)
POTASSIUM SERPL-SCNC: 3.6 MMOL/L — SIGNIFICANT CHANGE UP (ref 3.5–5.3)
RBC # BLD: 5.44 M/UL — SIGNIFICANT CHANGE UP (ref 4.2–5.8)
RBC # FLD: 17 % — HIGH (ref 10.3–14.5)
SODIUM SERPL-SCNC: 140 MMOL/L — SIGNIFICANT CHANGE UP (ref 135–145)
WBC # BLD: 8.24 K/UL — SIGNIFICANT CHANGE UP (ref 3.8–10.5)
WBC # FLD AUTO: 8.24 K/UL — SIGNIFICANT CHANGE UP (ref 3.8–10.5)

## 2024-03-14 PROCEDURE — 99233 SBSQ HOSP IP/OBS HIGH 50: CPT

## 2024-03-14 RX ORDER — DILTIAZEM HCL 120 MG
30 CAPSULE, EXT RELEASE 24 HR ORAL EVERY 6 HOURS
Refills: 0 | Status: COMPLETED | OUTPATIENT
Start: 2024-03-14 | End: 2024-03-14

## 2024-03-14 RX ORDER — DILTIAZEM HCL 120 MG
120 CAPSULE, EXT RELEASE 24 HR ORAL DAILY
Refills: 0 | Status: DISCONTINUED | OUTPATIENT
Start: 2024-03-15 | End: 2024-03-15

## 2024-03-14 RX ADMIN — Medication 1 GRAM(S): at 00:07

## 2024-03-14 RX ADMIN — Medication 1 GRAM(S): at 17:22

## 2024-03-14 RX ADMIN — Medication 1 GRAM(S): at 21:13

## 2024-03-14 RX ADMIN — PANTOPRAZOLE SODIUM 40 MILLIGRAM(S): 20 TABLET, DELAYED RELEASE ORAL at 05:07

## 2024-03-14 RX ADMIN — Medication 30 MILLIGRAM(S): at 21:13

## 2024-03-14 RX ADMIN — Medication 3 MILLIGRAM(S): at 00:07

## 2024-03-14 RX ADMIN — Medication 30 MILLIGRAM(S): at 00:08

## 2024-03-14 RX ADMIN — Medication 40 MILLIGRAM(S): at 05:07

## 2024-03-14 RX ADMIN — PANTOPRAZOLE SODIUM 40 MILLIGRAM(S): 20 TABLET, DELAYED RELEASE ORAL at 17:23

## 2024-03-14 RX ADMIN — ATORVASTATIN CALCIUM 40 MILLIGRAM(S): 80 TABLET, FILM COATED ORAL at 21:12

## 2024-03-14 RX ADMIN — Medication 30 MILLIGRAM(S): at 05:07

## 2024-03-14 RX ADMIN — RIVAROXABAN 15 MILLIGRAM(S): KIT at 17:22

## 2024-03-14 RX ADMIN — Medication 1 GRAM(S): at 05:07

## 2024-03-14 RX ADMIN — Medication 30 MILLIGRAM(S): at 11:25

## 2024-03-14 RX ADMIN — CARVEDILOL PHOSPHATE 25 MILLIGRAM(S): 80 CAPSULE, EXTENDED RELEASE ORAL at 05:07

## 2024-03-14 RX ADMIN — Medication 30 MILLIGRAM(S): at 17:22

## 2024-03-14 RX ADMIN — CARVEDILOL PHOSPHATE 25 MILLIGRAM(S): 80 CAPSULE, EXTENDED RELEASE ORAL at 17:22

## 2024-03-14 NOTE — PROGRESS NOTE ADULT - SUBJECTIVE AND OBJECTIVE BOX
[facility]  Gene Asencio PA-C  [address] [place] [address]  [phone]      INTERVAL HPI/OVERNIGHT EVENTS:  Pt s/e  Abdominal pain and dyspepsia resolving  Tolerating diet    MEDICATIONS  (STANDING):  aspirin enteric coated 81 milliGRAM(s) Oral daily  atorvastatin 40 milliGRAM(s) Oral at bedtime  carvedilol 25 milliGRAM(s) Oral every 12 hours  diltiazem    Tablet 30 milliGRAM(s) Oral every 6 hours  folic acid 1 milliGRAM(s) Oral daily  furosemide   Injectable 40 milliGRAM(s) IV Push every 12 hours  influenza  Vaccine (HIGH DOSE) 0.7 milliLiter(s) IntraMuscular once  pantoprazole  Injectable 40 milliGRAM(s) IV Push every 12 hours  rivaroxaban 15 milliGRAM(s) Oral with dinner  sucralfate 1 Gram(s) Oral every 6 hours    MEDICATIONS  (PRN):  acetaminophen     Tablet .. 650 milliGRAM(s) Oral every 6 hours PRN Temp greater or equal to 38C (100.4F), Mild Pain (1 - 3)  aluminum hydroxide/magnesium hydroxide/simethicone Suspension 30 milliLiter(s) Oral every 4 hours PRN Dyspepsia  melatonin 3 milliGRAM(s) Oral at bedtime PRN Insomnia  ondansetron Injectable 4 milliGRAM(s) IV Push every 6 hours PRN Nausea and/or Vomiting      Allergies  No Known Allergies    PHYSICAL EXAM:   Vital Signs:  Vital Signs Last 24 Hrs  T(C): 36.7 (14 Mar 2024 05:24), Max: 36.7 (14 Mar 2024 05:24)  T(F): 98.1 (14 Mar 2024 05:24), Max: 98.1 (14 Mar 2024 05:24)  HR: 70 (14 Mar 2024 11:10) (68 - 81)  BP: 126/70 (14 Mar 2024 11:10) (106/70 - 126/70)  BP(mean): --  RR: 18 (14 Mar 2024 11:10) (18 - 20)  SpO2: 95% (14 Mar 2024 11:10) (94% - 97%)    Parameters below as of 14 Mar 2024 11:10  Patient On (Oxygen Delivery Method): nasal cannula  O2 Flow (L/min): 2    GENERAL:  Appears stated age  HEENT:  NC/AT  CHEST:  Full & symmetric excursion  HEART:  Regular rhythm  ABDOMEN:  Soft, non-tender, non-distended  EXTEREMITIES:  no cyanosis  SKIN:  No rash  NEURO:  Alert      LABS:                        12.8   8.24  )-----------( 236      ( 14 Mar 2024 06:20 )             40.3     03-14    140  |  104  |  38<H>  ----------------------------<  123<H>  3.6   |  26  |  2.40<H>    Ca    8.6      14 Mar 2024 06:20  Mg     2.1     03-14    TPro  7.3  /  Alb  3.7  /  TBili  0.5  /  DBili  x   /  AST  30  /  ALT  37  /  AlkPhos  109  03-12      Urinalysis Basic - ( 14 Mar 2024 06:20 )    Color: x / Appearance: x / SG: x / pH: x  Gluc: 123 mg/dL / Ketone: x  / Bili: x / Urobili: x   Blood: x / Protein: x / Nitrite: x   Leuk Esterase: x / RBC: x / WBC x   Sq Epi: x / Non Sq Epi: x / Bacteria: x

## 2024-03-14 NOTE — PROGRESS NOTE ADULT - SUBJECTIVE AND OBJECTIVE BOX
Date/Time Patient Seen:  		  Referring MD:   Data Reviewed	       Patient is a 74y old  Male who presents with a chief complaint of SOB (13 Mar 2024 16:19)      Subjective/HPI     PAST MEDICAL & SURGICAL HISTORY:  Essential hypertension    Gastroesophageal reflux disease, esophagitis presence not specified    HTN (hypertension)    Hyperlipidemia    CAD (coronary artery disease)  stents x 2    No significant past surgical history    No significant past surgical history    CAD S/P percutaneous coronary angioplasty  with ANAHI          Medication list         MEDICATIONS  (STANDING):  aspirin enteric coated 81 milliGRAM(s) Oral daily  atorvastatin 40 milliGRAM(s) Oral at bedtime  carvedilol 25 milliGRAM(s) Oral every 12 hours  diltiazem    Tablet 30 milliGRAM(s) Oral every 6 hours  folic acid 1 milliGRAM(s) Oral daily  furosemide   Injectable 40 milliGRAM(s) IV Push every 12 hours  influenza  Vaccine (HIGH DOSE) 0.7 milliLiter(s) IntraMuscular once  pantoprazole  Injectable 40 milliGRAM(s) IV Push every 12 hours  rivaroxaban 15 milliGRAM(s) Oral with dinner  sucralfate 1 Gram(s) Oral every 6 hours    MEDICATIONS  (PRN):  acetaminophen     Tablet .. 650 milliGRAM(s) Oral every 6 hours PRN Temp greater or equal to 38C (100.4F), Mild Pain (1 - 3)  aluminum hydroxide/magnesium hydroxide/simethicone Suspension 30 milliLiter(s) Oral every 4 hours PRN Dyspepsia  melatonin 3 milliGRAM(s) Oral at bedtime PRN Insomnia  ondansetron Injectable 4 milliGRAM(s) IV Push every 6 hours PRN Nausea and/or Vomiting         Vitals log        ICU Vital Signs Last 24 Hrs  T(C): 36.7 (14 Mar 2024 05:24), Max: 36.7 (14 Mar 2024 05:24)  T(F): 98.1 (14 Mar 2024 05:24), Max: 98.1 (14 Mar 2024 05:24)  HR: 81 (14 Mar 2024 05:24) (68 - 89)  BP: 106/70 (14 Mar 2024 05:24) (106/70 - 125/70)  BP(mean): --  ABP: --  ABP(mean): --  RR: 20 (14 Mar 2024 05:24) (17 - 20)  SpO2: 97% (14 Mar 2024 05:24) (93% - 97%)    O2 Parameters below as of 14 Mar 2024 05:24  Patient On (Oxygen Delivery Method): room air                 Input and Output:  I&O's Detail      Lab Data                        13.4   11.15 )-----------( 250      ( 13 Mar 2024 06:04 )             41.7     03-13    140  |  108  |  36<H>  ----------------------------<  134<H>  3.8   |  22  |  2.00<H>    Ca    8.7      13 Mar 2024 06:04  Mg     2.1     03-13    TPro  7.3  /  Alb  3.7  /  TBili  0.5  /  DBili  x   /  AST  30  /  ALT  37  /  AlkPhos  109  03-12      CARDIAC MARKERS ( 13 Mar 2024 06:04 )  x     / x     / 81 U/L / x     / 1.2 ng/mL        Review of Systems	      Objective     Physical Examination    heart s1s2  lung dc BS  head nc      Pertinent Lab findings & Imaging      Molly:  NO   Adequate UO     I&O's Detail           Discussed with:     Cultures:	        Radiology

## 2024-03-14 NOTE — DIETITIAN INITIAL EVALUATION ADULT - NUTRITIONGOAL OUTCOME1
Please forward this important TCC information to your provider in order to maximize the post discharge care delivery of this patient.  C3 nurse attempted to contact Ancelmo Leon Jr.   for a TCC post hospital discharge follow up call. The patient has a scheduled HOSFU appointment with Kneny Medina MD  on 5/26 @ 1600.   Patient';s wife, Minal de la cruz  is in ICU @ [facility] at present and is on a respirator.  Respectfully, Matilda Grullon RN  [facility]  [email or web address]  Please do not reply to this message, as this inbox is not routinely monitored.  Pt to continue compliance with sodium/cholesterol restricted diet.

## 2024-03-14 NOTE — DIETITIAN INITIAL EVALUATION ADULT - OTHER INFO
Pt is a "75 yo M with a PMH of CAD sp PCI/ANAHI x 2 to LAD/Ramus 2016, MI 2016, HTN, HLD, AF on AC, CKD, who presents with SOB, JORDAN and orthopnea, admitted with acute on chr CHF exacerbation."    Visited pt at bedside this am. Pt reports good appetite/intake. Tolerating diet well. Ate well for breakfast meal this am. No food allergies. No chewing/swallowing difficulties. Denies N/V/D/C. No BMs thus far. CBW on admission 191#. Pt states that wt typically fluctuates between 180-190#. No edema noted. Skin: Intact. Pt currently on DASH/TLC diet. Provided verbal and written Heart Healthy diet education during visit today. Pt offers no food preferences/meal requests at this time. RD remains available and will continue to follow-up.

## 2024-03-14 NOTE — DIETITIAN INITIAL EVALUATION ADULT - PERTINENT MEDS FT
MEDICATIONS  (STANDING):  aspirin enteric coated 81 milliGRAM(s) Oral daily  atorvastatin 40 milliGRAM(s) Oral at bedtime  carvedilol 25 milliGRAM(s) Oral every 12 hours  diltiazem    Tablet 30 milliGRAM(s) Oral every 6 hours  folic acid 1 milliGRAM(s) Oral daily  furosemide   Injectable 40 milliGRAM(s) IV Push every 12 hours  influenza  Vaccine (HIGH DOSE) 0.7 milliLiter(s) IntraMuscular once  pantoprazole  Injectable 40 milliGRAM(s) IV Push every 12 hours  rivaroxaban 15 milliGRAM(s) Oral with dinner  sucralfate 1 Gram(s) Oral every 6 hours    MEDICATIONS  (PRN):  acetaminophen     Tablet .. 650 milliGRAM(s) Oral every 6 hours PRN Temp greater or equal to 38C (100.4F), Mild Pain (1 - 3)  aluminum hydroxide/magnesium hydroxide/simethicone Suspension 30 milliLiter(s) Oral every 4 hours PRN Dyspepsia  melatonin 3 milliGRAM(s) Oral at bedtime PRN Insomnia  ondansetron Injectable 4 milliGRAM(s) IV Push every 6 hours PRN Nausea and/or Vomiting

## 2024-03-14 NOTE — DIETITIAN INITIAL EVALUATION ADULT - ADD RECOMMEND
1) Continue current diet as tolerated  2) Monitor po intake, diet tolerance, weight trends, labs, GI function, skin integrity

## 2024-03-14 NOTE — PROGRESS NOTE ADULT - SUBJECTIVE AND OBJECTIVE BOX
Date of Service: 03-14-24 @ 13:13    Patient is a 74y old  Male who presents with a chief complaint of Chronic pulmonary edema     (14 Mar 2024 11:47)      INTERVAL HPI/OVERNIGHT EVENTS: Patient seen and examined. NAD. No complaints. Feeling better.    Vital Signs Last 24 Hrs  T(C): 36.7 (14 Mar 2024 05:24), Max: 36.7 (14 Mar 2024 05:24)  T(F): 98.1 (14 Mar 2024 05:24), Max: 98.1 (14 Mar 2024 05:24)  HR: 70 (14 Mar 2024 11:10) (68 - 81)  BP: 126/70 (14 Mar 2024 11:10) (106/70 - 126/70)  BP(mean): --  RR: 18 (14 Mar 2024 11:10) (18 - 20)  SpO2: 95% (14 Mar 2024 11:10) (94% - 97%)    Parameters below as of 14 Mar 2024 11:10  Patient On (Oxygen Delivery Method): nasal cannula  O2 Flow (L/min): 2      03-14    140  |  104  |  38<H>  ----------------------------<  123<H>  3.6   |  26  |  2.40<H>    Ca    8.6      14 Mar 2024 06:20  Mg     2.1     03-14    TPro  7.3  /  Alb  3.7  /  TBili  0.5  /  DBili  x   /  AST  30  /  ALT  37  /  AlkPhos  109  03-12                          12.8   8.24  )-----------( 236      ( 14 Mar 2024 06:20 )             40.3       CAPILLARY BLOOD GLUCOSE        Urinalysis Basic - ( 14 Mar 2024 06:20 )    Color: x / Appearance: x / SG: x / pH: x  Gluc: 123 mg/dL / Ketone: x  / Bili: x / Urobili: x   Blood: x / Protein: x / Nitrite: x   Leuk Esterase: x / RBC: x / WBC x   Sq Epi: x / Non Sq Epi: x / Bacteria: x              acetaminophen     Tablet .. 650 milliGRAM(s) Oral every 6 hours PRN  aluminum hydroxide/magnesium hydroxide/simethicone Suspension 30 milliLiter(s) Oral every 4 hours PRN  aspirin enteric coated 81 milliGRAM(s) Oral daily  atorvastatin 40 milliGRAM(s) Oral at bedtime  carvedilol 25 milliGRAM(s) Oral every 12 hours  diltiazem    Tablet 30 milliGRAM(s) Oral every 6 hours  folic acid 1 milliGRAM(s) Oral daily  furosemide   Injectable 40 milliGRAM(s) IV Push every 12 hours  influenza  Vaccine (HIGH DOSE) 0.7 milliLiter(s) IntraMuscular once  melatonin 3 milliGRAM(s) Oral at bedtime PRN  ondansetron Injectable 4 milliGRAM(s) IV Push every 6 hours PRN  pantoprazole  Injectable 40 milliGRAM(s) IV Push every 12 hours  rivaroxaban 15 milliGRAM(s) Oral with dinner  sucralfate 1 Gram(s) Oral every 6 hours              REVIEW OF SYSTEMS:  CONSTITUTIONAL: No fever, no weight loss, or no fatigue  NECK: No pain, no stiffness  RESPIRATORY: No cough, no wheezing, no chills, no hemoptysis, No shortness of breath  CARDIOVASCULAR: No chest pain, no palpitations, no dizziness, no leg swelling  GASTROINTESTINAL: No abdominal pain. No nausea, no vomiting, no hematemesis; No diarrhea, no constipation. No melena, no hematochezia.  GENITOURINARY: No dysuria, no frequency, no hematuria, no incontinence  NEUROLOGICAL: No headaches, no loss of strength, no numbness, no tremors  SKIN: No itching, no burning  MUSCULOSKELETAL: No joint pain, no swelling; No muscle, no back, no extremity pain  PSYCHIATRIC: No depression, no mood swings,   HEME/LYMPH: No easy bruising, no bleeding gums  ALLERY AND IMMUNOLOGIC: No hives       Consultant(s) Notes Reviewed:  [X] YES  [ ] NO    PHYSICAL EXAM:  GENERAL: NAD  HEAD:  Atraumatic, Normocephalic  EYES: EOMI, PERRLA, conjunctiva and sclera clear  ENMT: No tonsillar erythema, exudates, or enlargement; Moist mucous membranes  NECK: Supple, No JVD  NERVOUS SYSTEM:  Awake & alert  CHEST/LUNG: Clear to auscultation bilaterally; No rales, rhonchi, wheezing,  HEART: Regular rate and rhythm  ABDOMEN: Soft, Nontender, Nondistended; Bowel sounds present  EXTREMITIES:  No clubbing, cyanosis, or edema  LYMPH: No lymphadenopathy noted  SKIN: No rashes      Advanced care planning discussed with patient/family [X] YES   [ ] NO    Advanced care planning discussed with patient/family. Patient's health status was discussed. All appropriate changes have been made regarding patient's end-of-life care. Advanced care planning forms reviewed/discussed/completed.  20 minutes spent.

## 2024-03-14 NOTE — DIETITIAN INITIAL EVALUATION ADULT - PERTINENT LABORATORY DATA
03-14    140  |  104  |  38<H>  ----------------------------<  123<H>  3.6   |  26  |  2.40<H>    Ca    8.6      14 Mar 2024 06:20  Mg     2.1     03-14    TPro  7.3  /  Alb  3.7  /  TBili  0.5  /  DBili  x   /  AST  30  /  ALT  37  /  AlkPhos  109  03-12  A1C with Estimated Average Glucose Result: 5.6 % (12-21-23 @ 09:45)

## 2024-03-14 NOTE — CASE MANAGEMENT PROGRESS NOTE - NSCMPROGRESSNOTE_GEN_ALL_CORE
Discussed patient on rounds this morning and chart reviewed. Patient remains on IV Lasix 40 mg Q12H. CM met with the patient at the bedside who is on 2LNC at present time. CM discussed home care services for a visiting nurse. Patient has declined and stated his wife assist as needed and he can manage his medications and follow up appointments. PT- no skilled needs or DME recommended. CM remains available.

## 2024-03-14 NOTE — DIETITIAN INITIAL EVALUATION ADULT - NSICDXPASTMEDICALHX_GEN_ALL_CORE_FT
Bed: 01  Expected date: 11/23/18  Expected time: 8:23 PM  Means of arrival: [facility]  Comments:  [facility] 89 f fall, hospice patient  
PAST MEDICAL HISTORY:  CAD (coronary artery disease) stents x 2    Essential hypertension     Gastroesophageal reflux disease, esophagitis presence not specified     HTN (hypertension)     Hyperlipidemia

## 2024-03-14 NOTE — CONSULT NOTE ADULT - SUBJECTIVE AND OBJECTIVE BOX
Patient is a 74y old  Male who presents with a chief complaint of SOB (14 Mar 2024 12:13)    HPI:  This is a 74yoM with a PMH of CAD sp PCI/ANAHI x 2 to LAD/Ramus 2016, MI 2016, HTN, HLD, AF on AC, CKD, who presents with SOB, JORDAN and orthopnea for last few days. HE has been taking his lasix. He denies chest pain, fevers, or cough.  (13 Mar 2024 06:45)    Renal consult called for chronic kidney disease stage 3/4. History obtained from chart and patient. Pt denies any history of kidney disease. Not seen a nephrologist as out pt.   Denies NSAID use.       PAST MEDICAL HISTORY:  Essential hypertension    Gastroesophageal reflux disease, esophagitis presence not specified    HTN (hypertension)    Hyperlipidemia    CAD (coronary artery disease)        PAST SURGICAL HISTORY:  No significant past surgical history    No significant past surgical history    CAD S/P percutaneous coronary angioplasty        FAMILY HISTORY:  FH: heart disease  father and mother         SOCIAL HISTORY: No smoking or alcohol use     Allergies    No Known Allergies    Intolerances      Home Medications:  aspirin 81 mg oral tablet: 1 tab(s) orally once a day (13 Mar 2024 19:43)  atorvastatin 40 mg oral tablet: 1 tab(s) orally once a day (at bedtime) (13 Mar 2024 19:43)  carvedilol 25 mg oral tablet: 1 tab(s) orally 2 times a day (13 Mar 2024 19:43)  hydrALAZINE 25 mg oral tablet: 1 tab(s) orally 2 times a day (13 Mar 2024 19:42)  Xarelto 20 mg oral tablet: 1 tab(s) orally once a day (13 Mar 2024 19:43)    MEDICATIONS  (STANDING):  aspirin enteric coated 81 milliGRAM(s) Oral daily  atorvastatin 40 milliGRAM(s) Oral at bedtime  carvedilol 25 milliGRAM(s) Oral every 12 hours  diltiazem    Tablet 30 milliGRAM(s) Oral every 6 hours  folic acid 1 milliGRAM(s) Oral daily  influenza  Vaccine (HIGH DOSE) 0.7 milliLiter(s) IntraMuscular once  pantoprazole  Injectable 40 milliGRAM(s) IV Push every 12 hours  rivaroxaban 15 milliGRAM(s) Oral with dinner  sucralfate 1 Gram(s) Oral every 6 hours    MEDICATIONS  (PRN):  acetaminophen     Tablet .. 650 milliGRAM(s) Oral every 6 hours PRN Temp greater or equal to 38C (100.4F), Mild Pain (1 - 3)  aluminum hydroxide/magnesium hydroxide/simethicone Suspension 30 milliLiter(s) Oral every 4 hours PRN Dyspepsia  melatonin 3 milliGRAM(s) Oral at bedtime PRN Insomnia  ondansetron Injectable 4 milliGRAM(s) IV Push every 6 hours PRN Nausea and/or Vomiting      REVIEW OF SYSTEMS:  General: no distress  Respiratory: + SOB  Cardiovascular: No CP or Palpitations	  Gastrointestinal: No nausea, Vomiting. No diarrhea  Genitourinary: No urinary complaints	  Musculoskeletal: No new rash or lesions		  all other systems negative    T(F): 97.9 (24 @ 11:10), Max: 98.1 (24 @ 05:24)  HR: 70 (24 @ 11:10) (68 - 81)  BP: 126/70 (24 @ 11:10) (106/70 - 126/70)  RR: 18 (24 @ 11:10) (18 - 20)  SpO2: 95% (24 @ 11:10) (94% - 97%)  Wt(kg): --    PHYSICAL EXAM:  General: NAD  Respiratory: b/l air entry  Cardiovascular: S1 S2  Gastrointestinal: soft  Extremities: no edema            140  |  104  |  38<H>  ----------------------------<  123<H>  3.6   |  26  |  2.40<H>    Ca    8.6      14 Mar 2024 06:20  Mg     2.1         TPro  7.3  /  Alb  3.7  /  TBili  0.5  /  DBili  x   /  AST  30  /  ALT  37  /  AlkPhos  109                            12.8   8.24  )-----------( 236      ( 14 Mar 2024 06:20 )             40.3       Potassium: 3.6 mmol/L ( @ 06:20)  Blood Urea Nitrogen: 38 mg/dL ( @ 06:20)  Calcium: 8.6 mg/dL ( @ 06:20)  Hemoglobin: 12.8 g/dL ( @ 06:20)      Creatinine, Serum: 2.40 ( @ 06:20)  Creatinine, Serum: 2.00 ( @ 06:04)  Creatinine, Serum: 2.00 ( @ 23:25)      Urinalysis Basic - ( 14 Mar 2024 06:20 )    Color: x / Appearance: x / SG: x / pH: x  Gluc: 123 mg/dL / Ketone: x  / Bili: x / Urobili: x   Blood: x / Protein: x / Nitrite: x   Leuk Esterase: x / RBC: x / WBC x   Sq Epi: x / Non Sq Epi: x / Bacteria: x      LIVER FUNCTIONS - ( 12 Mar 2024 23:25 )  Alb: 3.7 g/dL / Pro: 7.3 g/dL / ALK PHOS: 109 U/L / ALT: 37 U/L / AST: 30 U/L / GGT: x           CARDIAC MARKERS ( 13 Mar 2024 06:04 )  x     / x     / 81 U/L / x     / 1.2 ng/mL      Creatine Kinase, Serum: 81 U/L (24 @ 06:04)          I&O's Detail    13 Mar 2024 07:01  -  14 Mar 2024 07:00  --------------------------------------------------------  IN:    Oral Fluid: 200 mL  Total IN: 200 mL    OUT:  Total OUT: 0 mL    Total NET: 200 mL      14 Mar 2024 07:01  -  14 Mar 2024 14:19  --------------------------------------------------------  IN:    Oral Fluid: 320 mL  Total IN: 320 mL    OUT:  Total OUT: 0 mL    Total NET: 320 mL        < from: Xray Chest 2 Views PA/Lat (24 @ 00:01) >    ACC: [id number] EXAM:  XR CHEST PA LAT 2V   ORDERED BY: NADEGE ACOSTA     PROCEDURE DATE:  2024          INTERPRETATION:  EXAM: XR CHEST PA AND LATERAL    INDICATION: sob PXR    COMPARISON: 2023    IMPRESSION: Slight improvement inaeration. There is still evidence of   increased bilateral perihilar interstitial markings as well as air space   opacification. Inflammatory infectious process and or congestive changes   should be considered. Borderline cardiomegaly. Some blunting of the CP   angles bilaterally compatible with some effusions. Regional osseous   structures appropriate for age. Follow-up suggested    --- End of Report ---            ILENE TOBIN MD; Attending Radiologist  This document has been electronically signed. Mar 13 2024  8:09AM    < end of copied text >

## 2024-03-14 NOTE — CONSULT NOTE ADULT - ASSESSMENT
CKD 3, CRS  CHF  Hypertension    Mild increase in creatinine with diuresis. Will follow creatinine trend. Will get kidney and bladder sonogram.   PO fluid restriction. Monitor BP trend. Titrate BP meds as needed. Cardiology follow up. Avoid nephrotoxic meds as possible.   D/w patient regarding need for out patient nephrology follow up.     Further recommendations pending clinical course. Thank you for the courtesy of this referral.

## 2024-03-14 NOTE — DIETITIAN INITIAL EVALUATION ADULT - ORAL INTAKE PTA/DIET HISTORY
Pt lives with spouse at home. Reports fair appetite/intake PTA. Typically consumes 2 meals/day. Follows a regular diet at home. Does not add salt to food. Although, aware that many of the foods he consumes are high sodium foods. Gets takeout/eats out at restaurants often.

## 2024-03-14 NOTE — PROGRESS NOTE ADULT - ASSESSMENT
74yM with a PMH of CAD sp PCI/ANAHI x 2 to LAD/Ramus 2016, MI 2016, HTN, HLD, AF on AC, CKD, who presents with SOB, JORDAN and orthopnea, admitted with acute on chr CHF exacerbation.     Acute on chronic HFpEF, CAD s/p stents, HTN, HLD, PAF on AC  - BNP 1065; CXR w/ diffuse moderate CHF   - CT Chest nonspecific bilateral ground glass parenchymal opacification. Differential diagnosis includes pulmonary edema and inflammatory/infectious etiologies. Small bilateral pleural effusions with dependent atelectasis. Small-to-moderate pericardial effusion  - ECHO 2/23/24 showed normal LV & RV size and function EF 57 %, increased LV mass and concentric hypertrophy, severely dilated LA, Mod MR, mild AR, Small pericardial effusion  - Now euvolemic on exam with no O2 requirement.  Non-orthopneic with no edema  - Switch IV Lasix to PO now.  Monitor renal function.  Creatinine uptrending    - Though has hx of Pafib has been gen in NSR, and loss of AV synchrony might have resulted in HF  - Rate control on tele.  Can D/C  - Continue Coreg 25 mg PO BID  - Continue Cardizem but can switch to  mg daily  - Continue AC with Xarelto    - Known CAD sp PCI/ANAHI x 2 to LAD/Ramus 2016, MI 2016  - EKG showed A fib @ 122.   - Troponin: <-23, <-20  - No evidence of any active ischemia   - Continue ASA and statin     - 's-120's  - Hold Hydralazine to allow room for AV nodals     - Monitor and replete lytes, keep K>4, Mg>2.  - Will continue to follow.    Gwendolyn Thibodeaux DNP, NP-C, AGACNP-C  Cardiology   Call TEAMS        74yM with a PMH of CAD sp PCI/ANAHI x 2 to LAD/Ramus 2016, MI 2016, HTN, HLD, AF on AC, CKD, who presents with SOB, JORDAN and orthopnea, admitted with acute on chr CHF exacerbation.     Acute on chronic HFpEF, CAD s/p stents, HTN, HLD, PAF on AC  - BNP 1065; CXR w/ diffuse moderate CHF   - CT Chest nonspecific bilateral ground glass parenchymal opacification. Differential diagnosis includes pulmonary edema and inflammatory/infectious etiologies. Small bilateral pleural effusions with dependent atelectasis. Small-to-moderate pericardial effusion  - ECHO 2/23/24 showed normal LV & RV size and function EF 57 %, increased LV mass and concentric hypertrophy, severely dilated LA, Mod MR, mild AR, Small pericardial effusion  - Now euvolemic on exam with no O2 requirement.  Non-orthopneic with no edema  - Cr worsening, stop IV lasix today. Will resume PO lasix once creatinine improves    - Though has hx of Pafib has been gen in NSR, and loss of AV synchrony might have resulted in HF  - Rate control on tele. Can D/C  - Continue Coreg 25 mg PO BID  - Continue Cardizem but can switch to  mg daily  - Continue AC with Xarelto    - Known CAD sp PCI/ANAHI x 2 to LAD/Ramus 2016, MI 2016  - EKG showed A fib @ 122.   - Troponin: <-23, <-20  - No evidence of any active ischemia   - Continue ASA and statin     - 's-120's  - Hold Hydralazine to allow room for AV nodals     - Monitor and replete lytes, keep K>4, Mg>2.  - Will continue to follow.    Gwendolyn Thibodeaux DNP, NP-C, AGACNP-C  Cardiology   Call TEAMS

## 2024-03-14 NOTE — PROGRESS NOTE ADULT - ASSESSMENT
74yoM with a PMH of CAD sp PCI/ANAHI x 2 to LAD/Ramus 2016, MI 2016, HTN, HLD, AF on AC, CKD, who presents with SOB, JORDAN and orthopnea    pericardial eff  ckd  anemia  OP  OA  pleural eff  atelectasis  dyspepsia  cad  HTN  HLD  AF    lasix diuresis in progress  vs noted    ECHO 2/23/24 showed normal LV & RV size and function EF 57 %, increased LV mass and concentric hypertrophy, severely dilated LA, Mod MR, mild AR, Small pericardial effusion  cardio eval noted  GI eval noted  cxr and TTE and CT chest reviewed  monitor VS and HD and Sat  Diuresis in Progress  I and O  replete lytes  serial renal indices  dietary discretion  on DOAC for AF  AF - rate and rhythm control

## 2024-03-14 NOTE — PROGRESS NOTE ADULT - SUBJECTIVE AND OBJECTIVE BOX
[facility] -- Roxi Pardo, Rommel Gillespie Savella, , Mina Colin  Office # [phone]    Follow Up:      Subjective/Observations:     REVIEW OF SYSTEMS: All other review of systems is negative unless indicated above  PAST MEDICAL & SURGICAL HISTORY:  Essential hypertension      Gastroesophageal reflux disease, esophagitis presence not specified      HTN (hypertension)      Hyperlipidemia      CAD (coronary artery disease)  stents x 2      CAD S/P percutaneous coronary angioplasty  with ANAHI        MEDICATIONS  (STANDING):  aspirin enteric coated 81 milliGRAM(s) Oral daily  atorvastatin 40 milliGRAM(s) Oral at bedtime  carvedilol 25 milliGRAM(s) Oral every 12 hours  diltiazem    Tablet 30 milliGRAM(s) Oral every 6 hours  folic acid 1 milliGRAM(s) Oral daily  furosemide   Injectable 40 milliGRAM(s) IV Push every 12 hours  influenza  Vaccine (HIGH DOSE) 0.7 milliLiter(s) IntraMuscular once  pantoprazole  Injectable 40 milliGRAM(s) IV Push every 12 hours  rivaroxaban 15 milliGRAM(s) Oral with dinner  sucralfate 1 Gram(s) Oral every 6 hours    MEDICATIONS  (PRN):  acetaminophen     Tablet .. 650 milliGRAM(s) Oral every 6 hours PRN Temp greater or equal to 38C (100.4F), Mild Pain (1 - 3)  aluminum hydroxide/magnesium hydroxide/simethicone Suspension 30 milliLiter(s) Oral every 4 hours PRN Dyspepsia  melatonin 3 milliGRAM(s) Oral at bedtime PRN Insomnia  ondansetron Injectable 4 milliGRAM(s) IV Push every 6 hours PRN Nausea and/or Vomiting    Allergies    No Known Allergies    Intolerances      Vital Signs Last 24 Hrs  T(C): 36.7 (14 Mar 2024 05:24), Max: 36.7 (14 Mar 2024 05:24)  T(F): 98.1 (14 Mar 2024 05:24), Max: 98.1 (14 Mar 2024 05:24)  HR: 81 (14 Mar 2024 05:24) (68 - 89)  BP: 106/70 (14 Mar 2024 05:24) (106/70 - 125/70)  BP(mean): --  RR: 20 (14 Mar 2024 05:24) (17 - 20)  SpO2: 97% (14 Mar 2024 05:24) (93% - 97%)    Parameters below as of 14 Mar 2024 05:24  Patient On (Oxygen Delivery Method): room air      I&O's Summary    13 Mar 2024 07:01  -  14 Mar 2024 07:00  --------------------------------------------------------  IN: 200 mL / OUT: 0 mL / NET: 200 mL      Weight (kg): 87 (03-14 @ 05:24)  PHYSICAL EXAM:  TELE:   Constitutional: NAD, awake and alert, well-developed  HEENT: Moist Mucous Membranes, Anicteric  Pulmonary: Non-labored, breath sounds are clear bilaterally, No wheezing, rales or rhonchi  Cardiovascular: Regular, S1 and S2, No murmurs, rubs, gallops or clicks  Gastrointestinal: Bowel Sounds present, soft, nontender.   Lymph: No peripheral edema. No lymphadenopathy.  Skin: No visible rashes or ulcers.  Psych:  Mood & affect appropriate  LABS: All Labs Reviewed:                        13.4   11.15 )-----------( 250      ( 13 Mar 2024 06:04 )             41.7                         12.4   11.97 )-----------( 217      ( 12 Mar 2024 23:25 )             38.2     13 Mar 2024 06:04    140    |  108    |  36     ----------------------------<  134    3.8     |  22     |  2.00   12 Mar 2024 23:25    140    |  112    |  37     ----------------------------<  116    4.3     |  18     |  2.00     Ca    8.7        13 Mar 2024 06:04  Ca    8.3        12 Mar 2024 23:25  Mg     2.1       13 Mar 2024 06:04    TPro  7.3    /  Alb  3.7    /  TBili  0.5    /  DBili  x      /  AST  30     /  ALT  37     /  AlkPhos  109    12 Mar 2024 23:25      CARDIAC MARKERS ( 13 Mar 2024 06:04 )  x     / x     / 81 U/L / x     / 1.2 ng/mL      12 Lead ECG:   Ventricular Rate 122 BPM    Atrial Rate 312 BPM    QRS Duration 92 ms    Q-T Interval 292 ms    QTC Calculation(Bazett) 416 ms    R Axis -25 degrees    T Axis 38 degrees    Diagnosis Line Atrial flutter with variable AV block  Abnormal ECG  When compared with ECG of 21-DEC-2023 03:17,  Atrial flutter has replaced Sinus rhythm  Vent. rate has increased BY  51 BPM  Confirmed by Bryn Diane MD ([id number]) on 3/13/2024 12:27:45 PM (03-12-24 @ 23:27)      TRANSTHORACIC ECHOCARDIOGRAM REPORT  ________________________________________________________________________________                                      _______       Pt. Name:       TRUDI COOLEY Study Date:    12/21/2023  MRN:            [id number]      YOB: 1950  Accession #:    [id number]     Age:           73 years  Account#:       [id number]    Gender:        M  Heart Rate:                   Height:        64.17 in (163.00 cm)  Rhythm:                       Weight:        189.59 lb (86.00 kg)  Blood Pressure: 136/61 mmHg   BSA/BMI:       1.92 m² / 32.37 kg/m²  ________________________________________________________________________________________  Referring Physician: [id number] Claudy Real  Primary Sonographer: Yessica Rdz RDCS    CPT:               ECHO TTE WO CON COMP W DOPP - 27943.m  Indication(s):     Dyspnea, unspecified - R06.00  Procedure:         Transthoracic echocardiogram with 2-D, M-mode and complete                     spectral and color flow Doppler.  Ordering Location: [facility]  Study Information: Image quality for this study is technically difficult.    _______________________________________________________________________________________     CONCLUSIONS:      1. Technically difficult image quality.   2. Left ventricular systolic function is low-normal with a LVEF estimated at 50-55%.   3. Normal right ventricular cavity size, wall thickness, and systolic function.   4. Mild mitral regurgitation.   5. Mild aortic regurgitation.   6. Tracetricuspid regurgitation.   7. Small pericardial effusion noted adjacent to the posterior left ventricle associated with a large anterior fat pad, without diastolic RV collapse.   8. The inferior vena cava is normal in size measuring 1.33 cm in diameter, (normal <2.1cm) with normal inspiratory collapse (normal >50%) consistent with normal right atrial pressure (~3, range 0-5mmHg).    ________________________________________________________________________________________  FINDINGS:     Left Ventricle:  Left ventricular systolic function is low normal with a calculated ejection fraction of 50-55% by the Burns's biplane method of disks. The left ventricular diastolic function is indeterminate.     Right Ventricle:  The right ventricular cavityis normal in size, normal wall thickness and normal systolic function.     Left Atrium:  The left atrium is normal.     Right Atrium:  The right atrium is normal in size with an indexed volume of 23.64 ml/m².     Aortic Valve:  The aortic valve anatomy cannot be determined with normal systolic excursion. There is moderate calcification of the aortic valve leaflets. There is no aortic valve stenosis. There is mild aortic regurgitation. AI VMax is 4.49 m/s. AI pressure half time is 602 msec. AI slope is 2.18 m/s².     Mitral Valve:  Structurally normal mitral valve with normal leaflet excursion. There is mild posterior calcification of the mitral valve annulus. There is mild mitral regurgitation.     Tricuspid Valve:  Structurally normal tricuspid valve with normal leaflet excursion. There is trace tricuspid regurgitation.     Pulmonic Valve:  The pulmonic valve was not well visualized.     Aorta:  The aortic root at the sinuses of Valsalva is normal in size, measuring 2.80 cm (indexed 1.46cm/m²). The aortic arch diameter is normal in size, measuring 2.2 cm (indexed 1.15 cm/m²).     Pericardium:  There is a small pericardial effusion noted adjacent to the posterior left ventricle.     Systemic Veins:  The inferior vena cava is normal in size measuring 1.33 cm in diameter, (normal <2.1cm) with normal inspiratory collapse (normal >50%) consistent with normal right atrial pressure (~3, range 0-5mmHg).  ____________________________________________________________________  QUANTITATIVE DATA:  Left Ventricle Measurements: (Indexed to BSA)     IVSd (2D):   1.1 cm  LVPWd (2D):  1.1 cm  LVIDd (2D):  4.9 cm  LVIDs (2D):  3.7 cm  LV Mass:     209 g  109.0 g/m²  LV Vol d, MOD A2C: 130.0 ml 67.85 ml/m²  LV Vol d, MOD A4C: 128.0 ml 66.81 ml/m²  LV Vol d, MOD BP:  129.5 ml 67.60 ml/m²  LV Vol s, MOD A2C: 63.4 ml  33.09 ml/m²  LV Vol s, MOD A4C: 67.1 ml  35.02 ml/m²  LV Vol s, MOD BP:  65.3 ml  34.06 ml/m²  LVOT SV MOD BP:    64.3 ml  LV EF% MOD BP:     50 %     MV E Vmax:    0.98 m/s  MV A Vmax:    0.39 m/s  MV E/A:       2.51  e' lateral:   9.14 cm/s  e' medial:    7.18 cm/s  E/e' lateral: 10.78  E/e' medial:  13.72  E/e' Average: 12.07  MV DT:        208 msec    Aorta Measurements: (normal range) (Indexed to BSA)     Sinuses of Valsalva: 2.80 cm (3.1 - 3.7 cm)  Ao Arch:             2.2 cm    Left Atrium Measurements: (Indexed to BSA)  LA Diam 2D: 3.50 cm    Right Atrial Measurements:     RA Vol:       45.30 ml  RA Vol Index: 23.64 ml/m²  LVOT / RVOT/ Qp/Qs Data: (Indexedto BSA)  LVOT Diameter: 2.10 cm    Aortic Valve Measurements:  AR Vmax  4.49 m/s  AR PHT   602 msec  AR [place] 2.18 m/s²    Mitral Valve Measurements:     MV E Vmax: 1.0 m/s         MR Vmax:          5.11 m/s  MV A Vmax: 0.4 m/s         MR Peak Gradient: 104.4 mmHg  MV E/A:    2.5       Tricuspid Valve Measurements:     RA Pressure: 3 mmHg    ________________________________________________________________________________________  Electronically signed on 12/21/2023 at 5:11:05 PM by Maggie Colin MD         *** Final ***      [facility] -- Roxi Pardo, Rommel Gillespie Savella, , Mina Colin  Office # [phone]    Follow Up:  CHF    Subjective/Observations: Seen and evaluated.  Comfortable on RA.  States, he ambulates to the BR on RA with no SOB or JORDAN.  Denies orthopnea.  Denies CP or palpitations.  No tele events    REVIEW OF SYSTEMS: All other review of systems is negative unless indicated above  PAST MEDICAL & SURGICAL HISTORY:  Essential hypertension  Gastroesophageal reflux disease, esophagitis presence not specified  HTN (hypertension)  Hyperlipidemia  CAD (coronary artery disease)  stents x 2  CAD S/P percutaneous coronary angioplasty  with ANAHI    MEDICATIONS  (STANDING):  aspirin enteric coated 81 milliGRAM(s) Oral daily  atorvastatin 40 milliGRAM(s) Oral at bedtime  carvedilol 25 milliGRAM(s) Oral every 12 hours  diltiazem    Tablet 30 milliGRAM(s) Oral every 6 hours  folic acid 1 milliGRAM(s) Oral daily  furosemide   Injectable 40 milliGRAM(s) IV Push every 12 hours  influenza  Vaccine (HIGH DOSE) 0.7 milliLiter(s) IntraMuscular once  pantoprazole  Injectable 40 milliGRAM(s) IV Push every 12 hours  rivaroxaban 15 milliGRAM(s) Oral with dinner  sucralfate 1 Gram(s) Oral every 6 hours    MEDICATIONS  (PRN):  acetaminophen     Tablet .. 650 milliGRAM(s) Oral every 6 hours PRN Temp greater or equal to 38C (100.4F), Mild Pain (1 - 3)  aluminum hydroxide/magnesium hydroxide/simethicone Suspension 30 milliLiter(s) Oral every 4 hours PRN Dyspepsia  melatonin 3 milliGRAM(s) Oral at bedtime PRN Insomnia  ondansetron Injectable 4 milliGRAM(s) IV Push every 6 hours PRN Nausea and/or Vomiting    Allergies    No Known Allergies    Intolerances    Vital Signs Last 24 Hrs  T(C): 36.7 (14 Mar 2024 05:24), Max: 36.7 (14 Mar 2024 05:24)  T(F): 98.1 (14 Mar 2024 05:24), Max: 98.1 (14 Mar 2024 05:24)  HR: 81 (14 Mar 2024 05:24) (68 - 89)  BP: 106/70 (14 Mar 2024 05:24) (106/70 - 125/70)  BP(mean): --  RR: 20 (14 Mar 2024 05:24) (17 - 20)  SpO2: 97% (14 Mar 2024 05:24) (93% - 97%)    Parameters below as of 14 Mar 2024 05:24  Patient On (Oxygen Delivery Method): room air  I&O's Summary    13 Mar 2024 07:01  -  14 Mar 2024 07:00  --------------------------------------------------------  IN: 200 mL / OUT: 0 mL / NET: 200 mL      Weight (kg): 87 (03-14 @ 05:24)  PHYSICAL EXAM:  TELE: Afib  Constitutional: NAD, awake and alert  HEENT: Moist Mucous Membranes, Anicteric  Pulmonary: Non-labored, breath sounds are clear bilaterally, No wheezing, rales or rhonchi  Cardiovascular: IRRR, S1 and S2, No murmurs, rubs, gallops or clicks  Gastrointestinal: Bowel Sounds present, soft, nontender.   Lymph: No peripheral edema. No lymphadenopathy.  Skin: No visible rashes or ulcers.  Psych:  Mood & affect appropriate  LABS: All Labs Reviewed:                        13.4   11.15 )-----------( 250      ( 13 Mar 2024 06:04 )             41.7                         12.4   11.97 )-----------( 217      ( 12 Mar 2024 23:25 )             38.2     13 Mar 2024 06:04    140    |  108    |  36     ----------------------------<  134    3.8     |  22     |  2.00   12 Mar 2024 23:25    140    |  112    |  37     ----------------------------<  116    4.3     |  18     |  2.00     Ca    8.7        13 Mar 2024 06:04  Ca    8.3        12 Mar 2024 23:25  Mg     2.1       13 Mar 2024 06:04    TPro  7.3    /  Alb  3.7    /  TBili  0.5    /  DBili  x      /  AST  30     /  ALT  37     /  AlkPhos  109    12 Mar 2024 23:25      CARDIAC MARKERS ( 13 Mar 2024 06:04 )  x     / x     / 81 U/L / x     / 1.2 ng/mL    12 Lead ECG:   Ventricular Rate 122 BPM    Atrial Rate 312 BPM    QRS Duration 92 ms    Q-T Interval 292 ms    QTC Calculation(Bazett) 416 ms    R Axis -25 degrees    T Axis 38 degrees    Diagnosis Line Atrial flutter with variable AV block  Abnormal ECG  When compared with ECG of 21-DEC-2023 03:17,  Atrial flutter has replaced Sinus rhythm  Vent. rate has increased BY  51 BPM  Confirmed by Bryn Diane MD ([id number]) on 3/13/2024 12:27:45 PM (03-12-24 @ 23:27)    TRANSTHORACIC ECHOCARDIOGRAM REPORT  ________________________________________________________________________________                                      _____     Pt. Name:       TRUDI COOLEY Study Date:    12/21/2023  MRN:            [id number]      YOB: 1950  Accession #:    [id number]     Age:           73 years  Account#:       [id number]    Gender:        M  Heart Rate:                   Height:        64.17 in (163.00 cm)  Rhythm:                       Weight:        189.59 lb (86.00 kg)  Blood Pressure: 136/61 mmHg   BSA/BMI:       1.92 m² / 32.37 kg/m²  ________________________________________________________________________________________  Referring Physician: [id number] Claudy Real  Primary Sonographer: Yessica Rdz [facility]    CPT:               ECHO TTE WO CON COMP W DOPP - 27030.m  Indication(s):     Dyspnea, unspecified - R06.00  Procedure:         Transthoracic echocardiogram with 2-D, M-mode and complete                     spectral and color flow Doppler.  Ordering Location: [facility]  Study Information: Image quality for this study is technically difficult.  _______________________________________________________________________________________     CONCLUSIONS:      1. Technically difficult image quality.   2. Left ventricular systolic function is low-normal with a LVEF estimated at 50-55%.   3. Normal right ventricular cavity size, wall thickness, and systolic function.   4. Mild mitral regurgitation.   5. Mild aortic regurgitation.   6. Trace tricuspid regurgitation.   7. Small pericardial effusion noted adjacent to the posterior left ventricle associated with a large anterior fat pad, without diastolic RV collapse.   8. The inferior vena cava is normal in size measuring 1.33 cm in diameter, (normal <2.1cm) with normal inspiratory collapse (normal >50%) consistent with normal right atrial pressure (~3, range 0-5mmHg).    ________________________________________________________________________________________  FINDINGS:     Left Ventricle:  Left ventricular systolic function is low normal with a calculated ejection fraction of 50-55% by the Burns's biplane method of disks. The left ventricular diastolic function is indeterminate.     Right Ventricle:  The right ventricular cavity is normal in size, normal wall thickness and normal systolic function.     Left Atrium:  The left atrium is normal.     Right Atrium:  The right atrium is normal in size with an indexed volume of 23.64 ml/m².     Aortic Valve:  The aortic valve anatomy cannot be determined with normal systolic excursion. There is moderate calcification of the aortic valve leaflets. There is no aortic valve stenosis. There is mild aortic regurgitation. AI VMax is 4.49 m/s. AI pressure half time is 602 msec. AI slope is 2.18 m/s².     Mitral Valve:  Structurally normal mitral valve with normal leaflet excursion. There is mild posterior calcification of the mitral valve annulus. There is mild mitral regurgitation.     Tricuspid Valve:  Structurally normal tricuspid valve with normal leaflet excursion. There is trace tricuspid regurgitation.     Pulmonic Valve:  The pulmonic valve was not well visualized.     Aorta:  The aortic root at the sinuses of Valsalva is normal in size, measuring 2.80 cm (indexed 1.46cm/m²). The aortic arch diameter is normal in size, measuring 2.2 cm (indexed 1.15 cm/m²).     Pericardium:  There is a small pericardial effusion noted adjacent to the posterior left ventricle.     Systemic Veins:  The inferior vena cava is normal in size measuring 1.33 cm in diameter, (normal <2.1cm) with normal inspiratory collapse (normal >50%) consistent with normal right atrial pressure (~3, range 0-5mmHg).  ____________________________________________________________________  QUANTITATIVE DATA:  Left Ventricle Measurements: (Indexed to BSA)     IVSd (2D):   1.1 cm  LVPWd (2D):  1.1 cm  LVIDd (2D):  4.9 cm  LVIDs (2D):  3.7 cm  LV Mass:     209 g  109.0 g/m²  LV Vol d, MOD A2C: 130.0 ml 67.85 ml/m²  LV Vol d, MOD A4C: 128.0 ml 66.81 ml/m²  LV Vol d, MOD BP:  129.5 ml 67.60 ml/m²  LV Vol s, MOD A2C: 63.4 ml  33.09 ml/m²  LV Vol s, MOD A4C: 67.1 ml  35.02 ml/m²  LV Vol s, MOD BP:  65.3 ml  34.06 ml/m²  LVOT SV MOD BP:    64.3 ml  LV EF% MOD BP:     50 %     MV E Vmax:    0.98 m/s  MV A Vmax:    0.39 m/s  MV E/A:       2.51  e' lateral:   9.14 cm/s  e' medial:    7.18 cm/s  E/e' lateral: 10.78  E/e' medial:  13.72  E/e' Average: 12.07  MV DT:        208 msec    Aorta Measurements: (normal range) (Indexed to BSA)     Sinuses of Valsalva: 2.80 cm (3.1 - 3.7 cm)  Ao Arch:             2.2 cm    Left Atrium Measurements: (Indexed to BSA)  LA Diam 2D: 3.50 cm    Right Atrial Measurements:     RA Vol:       45.30 ml  RA Vol Index: 23.64 ml/m²  LVOT / RVOT/ Qp/Qs Data: (Indexedto BSA)  LVOT Diameter: 2.10 cm    Aortic Valve Measurements:  AR Vmax  4.49 m/s  AR PHT   602 msec  AR [place] 2.18 m/s²    Mitral Valve Measurements:     MV E Vmax: 1.0 m/s         MR Vmax:          5.11 m/s  MV A Vmax: 0.4 m/s         MR Peak Gradient: 104.4 mmHg  MV E/A:    2.5       Tricuspid Valve Measurements:     RA Pressure: 3 mmHg    ________________________________________________________________________________________  Electronically signed on 12/21/2023 at 5:11:05 PM by Maggie Colin MD         *** Final ***      [facility] -- Roxi Pardo, Rommel Gillespie Savella, , Mina Colin  Office # [phone]    Follow Up:  CHF    Subjective/Observations: Seen and evaluated.  Comfortable on RA.  States, he ambulates to the BR on RA with no SOB or JORDAN.  Denies orthopnea.  Denies CP or palpitations.  No tele events    REVIEW OF SYSTEMS: All other review of systems is negative unless indicated above  PAST MEDICAL & SURGICAL HISTORY:  Essential hypertension  Gastroesophageal reflux disease, esophagitis presence not specified  HTN (hypertension)  Hyperlipidemia  CAD (coronary artery disease)  stents x 2  CAD S/P percutaneous coronary angioplasty  with ANAHI    MEDICATIONS  (STANDING):  aspirin enteric coated 81 milliGRAM(s) Oral daily  atorvastatin 40 milliGRAM(s) Oral at bedtime  carvedilol 25 milliGRAM(s) Oral every 12 hours  diltiazem    Tablet 30 milliGRAM(s) Oral every 6 hours  folic acid 1 milliGRAM(s) Oral daily  furosemide   Injectable 40 milliGRAM(s) IV Push every 12 hours  influenza  Vaccine (HIGH DOSE) 0.7 milliLiter(s) IntraMuscular once  pantoprazole  Injectable 40 milliGRAM(s) IV Push every 12 hours  rivaroxaban 15 milliGRAM(s) Oral with dinner  sucralfate 1 Gram(s) Oral every 6 hours    MEDICATIONS  (PRN):  acetaminophen     Tablet .. 650 milliGRAM(s) Oral every 6 hours PRN Temp greater or equal to 38C (100.4F), Mild Pain (1 - 3)  aluminum hydroxide/magnesium hydroxide/simethicone Suspension 30 milliLiter(s) Oral every 4 hours PRN Dyspepsia  melatonin 3 milliGRAM(s) Oral at bedtime PRN Insomnia  ondansetron Injectable 4 milliGRAM(s) IV Push every 6 hours PRN Nausea and/or Vomiting    Allergies    No Known Allergies    Intolerances    Vital Signs Last 24 Hrs  T(C): 36.7 (14 Mar 2024 05:24), Max: 36.7 (14 Mar 2024 05:24)  T(F): 98.1 (14 Mar 2024 05:24), Max: 98.1 (14 Mar 2024 05:24)  HR: 81 (14 Mar 2024 05:24) (68 - 89)  BP: 106/70 (14 Mar 2024 05:24) (106/70 - 125/70)  BP(mean): --  RR: 20 (14 Mar 2024 05:24) (17 - 20)  SpO2: 97% (14 Mar 2024 05:24) (93% - 97%)    Parameters below as of 14 Mar 2024 05:24  Patient On (Oxygen Delivery Method): room air  I&O's Summary    13 Mar 2024 07:01  -  14 Mar 2024 07:00  --------------------------------------------------------  IN: 200 mL / OUT: 0 mL / NET: 200 mL      Weight (kg): 87 (03-14 @ 05:24)  PHYSICAL EXAM:  TELE: Afib  Constitutional: NAD, awake and alert  HEENT: Moist Mucous Membranes, Anicteric  Pulmonary: Non-labored, breath sounds are clear bilaterally, No wheezing, rales or rhonchi  Cardiovascular: IRRR, S1 and S2, No murmurs, rubs, gallops or clicks  Gastrointestinal: Bowel Sounds present, soft, nontender.   Lymph: No peripheral edema. No lymphadenopathy.  Skin: No visible rashes or ulcers.  Psych:  Mood & affect appropriate  LABS: All Labs Reviewed:                        13.4   11.15 )-----------( 250      ( 13 Mar 2024 06:04 )             41.7                         12.4   11.97 )-----------( 217      ( 12 Mar 2024 23:25 )             38.2     13 Mar 2024 06:04    140    |  108    |  36     ----------------------------<  134    3.8     |  22     |  2.00   12 Mar 2024 23:25    140    |  112    |  37     ----------------------------<  116    4.3     |  18     |  2.00     Ca    8.7        13 Mar 2024 06:04  Ca    8.3        12 Mar 2024 23:25  Mg     2.1       13 Mar 2024 06:04    TPro  7.3    /  Alb  3.7    /  TBili  0.5    /  DBili  x      /  AST  30     /  ALT  37     /  AlkPhos  109    12 Mar 2024 23:25      CARDIAC MARKERS ( 13 Mar 2024 06:04 )  x     / x     / 81 U/L / x     / 1.2 ng/mL    12 Lead ECG:   Ventricular Rate 122 BPM    Atrial Rate 312 BPM    QRS Duration 92 ms    Q-T Interval 292 ms    QTC Calculation(Bazett) 416 ms    R Axis -25 degrees    T Axis 38 degrees    Diagnosis Line Atrial flutter with variable AV block  Abnormal ECG  When compared with ECG of 21-DEC-2023 03:17,  Atrial flutter has replaced Sinus rhythm  Vent. rate has increased BY  51 BPM  Confirmed by Bryn Diane MD ([id number]) on 3/13/2024 12:27:45 PM (03-12-24 @ 23:27)    TRANSTHORACIC ECHOCARDIOGRAM REPORT  ________________________________________________________________________________                                      _____     Pt. Name:       TRUDI COOLEY Study Date:    12/21/2023  MRN:            [id number]      YOB: 1950  Accession #:    [id number]     Age:           73 years  Account#:       [id number]    Gender:        M  Heart Rate:                   Height:        64.17 in (163.00 cm)  Rhythm:                       Weight:        189.59 lb (86.00 kg)  Blood Pressure: 136/61 mmHg   BSA/BMI:       1.92 m² / 32.37 kg/m²  ________________________________________________________________________________________  Referring Physician: [id number] Claudy Real  Primary Sonographer: Yessica Rdz [facility]    CPT:               ECHO TTE WO CON COMP W DOPP - 57886.m  Indication(s):     Dyspnea, unspecified - R06.00  Procedure:         Transthoracic echocardiogram with 2-D, M-mode and complete                     spectral and color flow Doppler.  Ordering Location: [facility]  Study Information: Image quality for this study is technically difficult.  _______________________________________________________________________________________     CONCLUSIONS:      1. Technically difficult image quality.   2. Left ventricular systolic function is low-normal with a LVEF estimated at 50-55%.   3. Normal right ventricular cavity size, wall thickness, and systolic function.   4. Mild mitral regurgitation.   5. Mild aortic regurgitation.   6. Trace tricuspid regurgitation.   7. Small pericardial effusion noted adjacent to the posterior left ventricle associated with a large anterior fat pad, without diastolic RV collapse.   8. The inferior vena cava is normal in size measuring 1.33 cm in diameter, (normal <2.1cm) with normal inspiratory collapse (normal >50%) consistent with normal right atrial pressure (~3, range 0-5mmHg).    ________________________________________________________________________________________  FINDINGS:     Left Ventricle:  Left ventricular systolic function is low normal with a calculated ejection fraction of 50-55% by the Burns's biplane method of disks. The left ventricular diastolic function is indeterminate.     Right Ventricle:  The right ventricular cavity is normal in size, normal wall thickness and normal systolic function.     Left Atrium:  The left atrium is normal.     Right Atrium:  The right atrium is normal in size with an indexed volume of 23.64 ml/m².     Aortic Valve:  The aortic valve anatomy cannot be determined with normal systolic excursion. There is moderate calcification of the aortic valve leaflets. There is no aortic valve stenosis. There is mild aortic regurgitation. AI VMax is 4.49 m/s. AI pressure half time is 602 msec. AI slope is 2.18 m/s².     Mitral Valve:  Structurally normal mitral valve with normal leaflet excursion. There is mild posterior calcification of the mitral valve annulus. There is mild mitral regurgitation.     Tricuspid Valve:  Structurally normal tricuspid valve with normal leaflet excursion. There is trace tricuspid regurgitation.     Pulmonic Valve:  The pulmonic valve was not well visualized.     Aorta:  The aortic root at the sinuses of Valsalva is normal in size, measuring 2.80 cm (indexed 1.46cm/m²). The aortic arch diameter is normal in size, measuring 2.2 cm (indexed 1.15 cm/m²).     Pericardium:  There is a small pericardial effusion noted adjacent to the posterior left ventricle.     Systemic Veins:  The inferior vena cava is normal in size measuring 1.33 cm in diameter, (normal <2.1cm) with normal inspiratory collapse (normal >50%) consistent with normal right atrial pressure (~3, range 0-5mmHg).  ____________________________________________________________________  QUANTITATIVE DATA:  Left Ventricle Measurements: (Indexed to BSA)     IVSd (2D):   1.1 cm  LVPWd (2D):  1.1 cm  LVIDd (2D):  4.9 cm  LVIDs (2D):  3.7 cm  LV Mass:     209 g  109.0 g/m²  LV Vol d, MOD A2C: 130.0 ml 67.85 ml/m²  LV Vol d, MOD A4C: 128.0 ml 66.81 ml/m²  LV Vol d, MOD BP:  129.5 ml 67.60 ml/m²  LV Vol s, MOD A2C: 63.4 ml  33.09 ml/m²  LV Vol s, MOD A4C: 67.1 ml  35.02 ml/m²  LV Vol s, MOD BP:  65.3 ml  34.06 ml/m²  LVOT SV MOD BP:    64.3 ml  LV EF% MOD BP:     50 %     MV E Vmax:    0.98 m/s  MV A Vmax:    0.39 m/s  MV E/A:       2.51  e' lateral:   9.14 cm/s  e' medial:    7.18 cm/s  E/e' lateral: 10.78  E/e' medial:  13.72  E/e' Average: 12.07  MV DT:        208 msec    Aorta Measurements: (normal range) (Indexed to BSA)     Sinuses of Valsalva: 2.80 cm (3.1 - 3.7 cm)  Ao Arch:             2.2 cm    Left Atrium Measurements: (Indexed to BSA)  LA Diam 2D: 3.50 cm    Right Atrial Measurements:     RA Vol:       45.30 ml  RA Vol Index: 23.64 ml/m²  LVOT / RVOT/ Qp/Qs Data: (Indexedto BSA)  LVOT Diameter: 2.10 cm    Aortic Valve Measurements:  AR Vmax  4.49 m/s  AR PHT   602 msec  AR [place] 2.18 m/s²    Mitral Valve Measurements:     MV E Vmax: 1.0 m/s         MR Vmax:          5.11 m/s  MV A Vmax: 0.4 m/s         MR Peak Gradient: 104.4 mmHg  MV E/A:    2.5       Tricuspid Valve Measurements:     RA Pressure: 3 mmHg    ________________________________________________________________________________________  Electronically signed on 12/21/2023 at 5:11:05 PM by Maggie Colin MD         *** Final ***

## 2024-03-14 NOTE — PROGRESS NOTE ADULT - ASSESSMENT
chf exacerbation  dyspepsia    cont reg diet  proton pump inhibitor bid  carafate 1g four times a day  monitor po intake  monitor GI fn  will need outpatient  upper gastrointestinal endoscopy +/- colonoscopy  d/w patient     I reviewed the overnight course of events on the unit, re-confirming the patient history. I discussed the care with the patient  Differential diagnosis and plan of care discussed with patient after the evaluation  35 minutes spent on total encounter of which more than fifty percent of the encounter was spent counseling and/or coordinating care by the attending physician.

## 2024-03-15 ENCOUNTER — TRANSCRIPTION ENCOUNTER (OUTPATIENT)
Age: 74
End: 2024-03-15

## 2024-03-15 ENCOUNTER — RESULT REVIEW (OUTPATIENT)
Age: 74
End: 2024-03-15

## 2024-03-15 VITALS
TEMPERATURE: 98 F | SYSTOLIC BLOOD PRESSURE: 128 MMHG | DIASTOLIC BLOOD PRESSURE: 82 MMHG | HEART RATE: 83 BPM | RESPIRATION RATE: 18 BRPM | OXYGEN SATURATION: 99 %

## 2024-03-15 LAB
ANION GAP SERPL CALC-SCNC: 9 MMOL/L — SIGNIFICANT CHANGE UP (ref 5–17)
BUN SERPL-MCNC: 38 MG/DL — HIGH (ref 7–23)
CALCIUM SERPL-MCNC: 8.9 MG/DL — SIGNIFICANT CHANGE UP (ref 8.5–10.1)
CHLORIDE SERPL-SCNC: 107 MMOL/L — SIGNIFICANT CHANGE UP (ref 96–108)
CO2 SERPL-SCNC: 26 MMOL/L — SIGNIFICANT CHANGE UP (ref 22–31)
CREAT SERPL-MCNC: 2.3 MG/DL — HIGH (ref 0.5–1.3)
EGFR: 29 ML/MIN/1.73M2 — LOW
GLUCOSE SERPL-MCNC: 109 MG/DL — HIGH (ref 70–99)
HCT VFR BLD CALC: 38.4 % — LOW (ref 39–50)
HGB BLD-MCNC: 12.4 G/DL — LOW (ref 13–17)
MAGNESIUM SERPL-MCNC: 2.1 MG/DL — SIGNIFICANT CHANGE UP (ref 1.6–2.6)
MCHC RBC-ENTMCNC: 24 PG — LOW (ref 27–34)
MCHC RBC-ENTMCNC: 32.3 GM/DL — SIGNIFICANT CHANGE UP (ref 32–36)
MCV RBC AUTO: 74.4 FL — LOW (ref 80–100)
NRBC # BLD: 0 /100 WBCS — SIGNIFICANT CHANGE UP (ref 0–0)
PHOSPHATE SERPL-MCNC: 3.3 MG/DL — SIGNIFICANT CHANGE UP (ref 2.5–4.5)
PLATELET # BLD AUTO: 233 K/UL — SIGNIFICANT CHANGE UP (ref 150–400)
POTASSIUM SERPL-MCNC: 3.6 MMOL/L — SIGNIFICANT CHANGE UP (ref 3.5–5.3)
POTASSIUM SERPL-SCNC: 3.6 MMOL/L — SIGNIFICANT CHANGE UP (ref 3.5–5.3)
RBC # BLD: 5.16 M/UL — SIGNIFICANT CHANGE UP (ref 4.2–5.8)
RBC # FLD: 16.2 % — HIGH (ref 10.3–14.5)
SODIUM SERPL-SCNC: 142 MMOL/L — SIGNIFICANT CHANGE UP (ref 135–145)
URATE SERPL-MCNC: 8.7 MG/DL — SIGNIFICANT CHANGE UP (ref 3.4–8.8)
WBC # BLD: 7.74 K/UL — SIGNIFICANT CHANGE UP (ref 3.8–10.5)
WBC # FLD AUTO: 7.74 K/UL — SIGNIFICANT CHANGE UP (ref 3.8–10.5)

## 2024-03-15 PROCEDURE — 83880 ASSAY OF NATRIURETIC PEPTIDE: CPT

## 2024-03-15 PROCEDURE — 80053 COMPREHEN METABOLIC PANEL: CPT

## 2024-03-15 PROCEDURE — 80048 BASIC METABOLIC PNL TOTAL CA: CPT

## 2024-03-15 PROCEDURE — 99232 SBSQ HOSP IP/OBS MODERATE 35: CPT

## 2024-03-15 PROCEDURE — 93005 ELECTROCARDIOGRAM TRACING: CPT

## 2024-03-15 PROCEDURE — 83735 ASSAY OF MAGNESIUM: CPT

## 2024-03-15 PROCEDURE — 76770 US EXAM ABDO BACK WALL COMP: CPT | Mod: 26

## 2024-03-15 PROCEDURE — 76770 US EXAM ABDO BACK WALL COMP: CPT

## 2024-03-15 PROCEDURE — 84484 ASSAY OF TROPONIN QUANT: CPT

## 2024-03-15 PROCEDURE — 97161 PT EVAL LOW COMPLEX 20 MIN: CPT

## 2024-03-15 PROCEDURE — 85027 COMPLETE CBC AUTOMATED: CPT

## 2024-03-15 PROCEDURE — 82553 CREATINE MB FRACTION: CPT

## 2024-03-15 PROCEDURE — 84550 ASSAY OF BLOOD/URIC ACID: CPT

## 2024-03-15 PROCEDURE — 99285 EMERGENCY DEPT VISIT HI MDM: CPT

## 2024-03-15 PROCEDURE — 85025 COMPLETE CBC W/AUTO DIFF WBC: CPT

## 2024-03-15 PROCEDURE — 93306 TTE W/DOPPLER COMPLETE: CPT

## 2024-03-15 PROCEDURE — 93306 TTE W/DOPPLER COMPLETE: CPT | Mod: 26

## 2024-03-15 PROCEDURE — 84100 ASSAY OF PHOSPHORUS: CPT

## 2024-03-15 PROCEDURE — 82550 ASSAY OF CK (CPK): CPT

## 2024-03-15 PROCEDURE — 36415 COLL VENOUS BLD VENIPUNCTURE: CPT

## 2024-03-15 PROCEDURE — 96374 THER/PROPH/DIAG INJ IV PUSH: CPT

## 2024-03-15 PROCEDURE — 71046 X-RAY EXAM CHEST 2 VIEWS: CPT

## 2024-03-15 RX ORDER — PANTOPRAZOLE SODIUM 20 MG/1
1 TABLET, DELAYED RELEASE ORAL
Qty: 30 | Refills: 0
Start: 2024-03-15 | End: 2024-04-13

## 2024-03-15 RX ORDER — FUROSEMIDE 40 MG
1 TABLET ORAL
Qty: 30 | Refills: 0
Start: 2024-03-15 | End: 2024-04-13

## 2024-03-15 RX ORDER — DILTIAZEM HCL 120 MG
1 CAPSULE, EXT RELEASE 24 HR ORAL
Qty: 30 | Refills: 0
Start: 2024-03-15 | End: 2024-04-13

## 2024-03-15 RX ADMIN — Medication 1 GRAM(S): at 11:13

## 2024-03-15 RX ADMIN — Medication 1 MILLIGRAM(S): at 11:13

## 2024-03-15 RX ADMIN — Medication 81 MILLIGRAM(S): at 11:13

## 2024-03-15 RX ADMIN — Medication 120 MILLIGRAM(S): at 05:11

## 2024-03-15 RX ADMIN — CARVEDILOL PHOSPHATE 25 MILLIGRAM(S): 80 CAPSULE, EXTENDED RELEASE ORAL at 05:11

## 2024-03-15 RX ADMIN — PANTOPRAZOLE SODIUM 40 MILLIGRAM(S): 20 TABLET, DELAYED RELEASE ORAL at 05:11

## 2024-03-15 RX ADMIN — Medication 1 GRAM(S): at 05:11

## 2024-03-15 NOTE — DISCHARGE NOTE NURSING/CASE MANAGEMENT/SOCIAL WORK - NSDCFUADDAPPT_GEN_ALL_CORE_FT
An appointment has been scheduled with Dr. Gillespie (Cardiologist) on March 27th at 2:30pm. CM attempted to get a sooner appointment. Please contact Dr. Gillespie's office next week to inquire if they have a sooner appointment. You have declined for  to arrange a home care visiting nurse.

## 2024-03-15 NOTE — DISCHARGE NOTE NURSING/CASE MANAGEMENT/SOCIAL WORK - NSDCVIVACCINE_GEN_ALL_CORE_FT
Tdap; 12-Jan-2017 22:28; Jonny Gonsalves (ANA); [employer]; [id number]; IntraMuscular; Deltoid Right.; 0.5 milliLiter(s); VIS (VIS Published: 09-May-2013, VIS Presented: 12-Jan-2017);

## 2024-03-15 NOTE — DISCHARGE NOTE NURSING/CASE MANAGEMENT/SOCIAL WORK - PATIENT PORTAL LINK FT
You can access the FollowMyHealth Patient Portal offered by [facility] by registering at the following website: [email or web address]. By joining [facility]’s FollowMyHealth portal, you will also be able to view your health information using other applications (apps) compatible with our system.

## 2024-03-15 NOTE — DISCHARGE NOTE PROVIDER - PROVIDER TOKENS
PROVIDER:[TOKEN:[[id number]],FOLLOWUP:[1 week],ESTABLISHEDPATIENT:[T]],PROVIDER:[TOKEN:[[id number]],FOLLOWUP:[2 weeks]],PROVIDER:[TOKEN:[[id number]],FOLLOWUP:[1 week],ESTABLISHEDPATIENT:[T]]

## 2024-03-15 NOTE — PROGRESS NOTE ADULT - PROBLEM SELECTOR PLAN 3
LEOBARDO on CKD 3  Avoid nephrotoxic medications  Monitor BMP  Renal f/u
LEOBARDO on CKD 3  Hold iv lasix  Avoid nephrotoxic medications  Monitor BMP  Renal consult

## 2024-03-15 NOTE — PROGRESS NOTE ADULT - SUBJECTIVE AND OBJECTIVE BOX
Date of Service: 03-15-24 @ 11:39    Patient is a 74y old  Male who presents with a chief complaint of SOB (15 Mar 2024 10:45)      INTERVAL HPI/OVERNIGHT EVENTS: Patient seen and examined. NAD. No complaints.    Vital Signs Last 24 Hrs  T(C): 36.4 (15 Mar 2024 11:26), Max: 36.5 (14 Mar 2024 20:35)  T(F): 97.5 (15 Mar 2024 11:26), Max: 97.7 (14 Mar 2024 20:35)  HR: 83 (15 Mar 2024 11:26) (62 - 83)  BP: 128/82 (15 Mar 2024 11:26) (120/76 - 128/82)  BP(mean): --  RR: 18 (15 Mar 2024 11:26) (18 - 18)  SpO2: 99% (15 Mar 2024 11:26) (97% - 99%)    Parameters below as of 15 Mar 2024 11:26  Patient On (Oxygen Delivery Method): room air        03-15    142  |  107  |  38<H>  ----------------------------<  109<H>  3.6   |  26  |  2.30<H>    Ca    8.9      15 Mar 2024 06:45  Phos  3.3     03-15  Mg     2.1     03-15                            12.4   7.74  )-----------( 233      ( 15 Mar 2024 06:45 )             38.4       CAPILLARY BLOOD GLUCOSE        Urinalysis Basic - ( 15 Mar 2024 06:45 )    Color: x / Appearance: x / SG: x / pH: x  Gluc: 109 mg/dL / Ketone: x  / Bili: x / Urobili: x   Blood: x / Protein: x / Nitrite: x   Leuk Esterase: x / RBC: x / WBC x   Sq Epi: x / Non Sq Epi: x / Bacteria: x              acetaminophen     Tablet .. 650 milliGRAM(s) Oral every 6 hours PRN  aluminum hydroxide/magnesium hydroxide/simethicone Suspension 30 milliLiter(s) Oral every 4 hours PRN  aspirin enteric coated 81 milliGRAM(s) Oral daily  atorvastatin 40 milliGRAM(s) Oral at bedtime  carvedilol 25 milliGRAM(s) Oral every 12 hours  diltiazem    milliGRAM(s) Oral daily  folic acid 1 milliGRAM(s) Oral daily  influenza  Vaccine (HIGH DOSE) 0.7 milliLiter(s) IntraMuscular once  melatonin 3 milliGRAM(s) Oral at bedtime PRN  ondansetron Injectable 4 milliGRAM(s) IV Push every 6 hours PRN  pantoprazole  Injectable 40 milliGRAM(s) IV Push every 12 hours  rivaroxaban 15 milliGRAM(s) Oral with dinner  sucralfate 1 Gram(s) Oral every 6 hours              REVIEW OF SYSTEMS:  CONSTITUTIONAL: No fever, no weight loss, or no fatigue  NECK: No pain, no stiffness  RESPIRATORY: No cough, no wheezing, no chills, no hemoptysis, No shortness of breath  CARDIOVASCULAR: No chest pain, no palpitations, no dizziness, no leg swelling  GASTROINTESTINAL: No abdominal pain. No nausea, no vomiting, no hematemesis; No diarrhea, no constipation. No melena, no hematochezia.  GENITOURINARY: No dysuria, no frequency, no hematuria, no incontinence  NEUROLOGICAL: No headaches, no loss of strength, no numbness, no tremors  SKIN: No itching, no burning  MUSCULOSKELETAL: No joint pain, no swelling; No muscle, no back, no extremity pain  PSYCHIATRIC: No depression, no mood swings,   HEME/LYMPH: No easy bruising, no bleeding gums  ALLERY AND IMMUNOLOGIC: No hives       Consultant(s) Notes Reviewed:  [X] YES  [ ] NO    PHYSICAL EXAM:  GENERAL: NAD  HEAD:  Atraumatic, Normocephalic  EYES: EOMI, PERRLA, conjunctiva and sclera clear  ENMT: No tonsillar erythema, exudates, or enlargement; Moist mucous membranes  NECK: Supple, No JVD  NERVOUS SYSTEM:  Awake & alert  CHEST/LUNG: Clear to auscultation bilaterally; No rales, rhonchi, wheezing,  HEART: Regular rate and rhythm  ABDOMEN: Soft, Nontender, Nondistended; Bowel sounds present  EXTREMITIES:  No clubbing, cyanosis, or edema  LYMPH: No lymphadenopathy noted  SKIN: No rashes      Advanced care planning discussed with patient/family [X] YES   [ ] NO    Advanced care planning discussed with patient/family. Patient's health status was discussed. All appropriate changes have been made regarding patient's end-of-life care. Advanced care planning forms reviewed/discussed/completed.  20 minutes spent.

## 2024-03-15 NOTE — PROGRESS NOTE ADULT - ASSESSMENT
74yM with a PMH of CAD sp PCI/ANAHI x 2 to LAD/Ramus 2016, MI 2016, HTN, HLD, AF on AC, CKD, who presents with SOB, JORDAN and orthopnea, admitted with acute on chr CHF exacerbation.     Acute on chronic HFpEF, CAD s/p stents, HTN, HLD, PAF on AC  - BNP 1065; CXR w/ diffuse moderate CHF   - CT Chest nonspecific bilateral ground glass parenchymal opacification. Differential diagnosis includes pulmonary edema and inflammatory/infectious etiologies. Small bilateral pleural effusions with dependent atelectasis. Small-to-moderate pericardial effusion  - TTE (2/23/24) normal LV & RV size and function EF 57 %, increased LV mass and concentric hypertrophy, severely dilated LA, Mod MR, mild AR, Small pericardial effusion  - Euvolemic on exam, remains on O2 supp, wean as tolerated.  Non-orthopneic with no edema  - +LEOBARDO , s/p Lasix IV  - start Lasix PO once creatinine improves    - known hx of pAFib   - rates controlled 60-70's per flow sheet  - BP stable and controlled   - Continue Coreg 25 mg PO BID  - Continue Cardizem  mg daily  - Continue AC with Xarelto  - continue to monitor hemodynamics     - Known CAD s/p PCI/ANAHI x 2 to LAD/Ramus 2016, MI 2016  - EKG showed A fib @ 122.   - Troponin: <-23, <-20  - No evidence of any active ischemia   - no anginal complaints   - Continue ASA and statin   - Monitor and replete Lytes. Keep K > 4 and Mg > 2    - Will continue to follow.    HONORIO RochaNOEL  Nurse Practitioner - Cardiology   call TEAMS

## 2024-03-15 NOTE — DISCHARGE NOTE PROVIDER - NSDCCPCAREPLAN_GEN_ALL_CORE_FT
PRINCIPAL DISCHARGE DIAGNOSIS  Diagnosis: Acute on chronic diastolic heart failure  Assessment and Plan of Treatment: Continue furosemide  Follow-up with Dr. Gillespie next week      SECONDARY DISCHARGE DIAGNOSES  Diagnosis: Atrial fibrillation  Assessment and Plan of Treatment: Continue Xarelto  You were also started on Cardizem    Diagnosis: LEOBARDO (acute kidney injury)  Assessment and Plan of Treatment: Follow-up with the nephrologist within 1-2 weeks    Diagnosis: Essential hypertension  Assessment and Plan of Treatment: Continue Coreg  Stop hydralazine

## 2024-03-15 NOTE — PROGRESS NOTE ADULT - SUBJECTIVE AND OBJECTIVE BOX
Patient is a 74y old  Male who presents with a chief complaint of SOB (15 Mar 2024 11:54)    Patient seen in follow up for CKD.        PAST MEDICAL HISTORY:  Essential hypertension    Gastroesophageal reflux disease, esophagitis presence not specified    HTN (hypertension)    Hyperlipidemia    CAD (coronary artery disease)      MEDICATIONS  (STANDING):  aspirin enteric coated 81 milliGRAM(s) Oral daily  atorvastatin 40 milliGRAM(s) Oral at bedtime  carvedilol 25 milliGRAM(s) Oral every 12 hours  diltiazem    milliGRAM(s) Oral daily  folic acid 1 milliGRAM(s) Oral daily  influenza  Vaccine (HIGH DOSE) 0.7 milliLiter(s) IntraMuscular once  pantoprazole  Injectable 40 milliGRAM(s) IV Push every 12 hours  rivaroxaban 15 milliGRAM(s) Oral with dinner  sucralfate 1 Gram(s) Oral every 6 hours    MEDICATIONS  (PRN):  acetaminophen     Tablet .. 650 milliGRAM(s) Oral every 6 hours PRN Temp greater or equal to 38C (100.4F), Mild Pain (1 - 3)  aluminum hydroxide/magnesium hydroxide/simethicone Suspension 30 milliLiter(s) Oral every 4 hours PRN Dyspepsia  melatonin 3 milliGRAM(s) Oral at bedtime PRN Insomnia  ondansetron Injectable 4 milliGRAM(s) IV Push every 6 hours PRN Nausea and/or Vomiting    T(C): 36.4 (03-15-24 @ 11:26), Max: 36.7 (03-14-24 @ 05:24)  HR: 83 (03-15-24 @ 11:26) (62 - 83)  BP: 128/82 (03-15-24 @ 11:26) (106/70 - 128/82)  RR: 18 (03-15-24 @ 11:26) (18 - 20)  SpO2: 99% (03-15-24 @ 11:26) (95% - 99%)  Wt(kg): --  I&O's Detail    14 Mar 2024 07:01  -  15 Mar 2024 07:00  --------------------------------------------------------  IN:    Oral Fluid: 820 mL  Total IN: 820 mL    OUT:    Voided (mL): 900 mL  Total OUT: 900 mL    Total NET: -80 mL          PHYSICAL EXAM:  General: No distress  Respiratory: b/l air entry  Cardiovascular: S1 S2  Gastrointestinal: soft  Extremities:  no edema                              12.4   7.74  )-----------( 233      ( 15 Mar 2024 06:45 )             38.4     03-15    142  |  107  |  38<H>  ----------------------------<  109<H>  3.6   |  26  |  2.30<H>    Ca    8.9      15 Mar 2024 06:45  Phos  3.3     03-15  Mg     2.1     03-15            Urinalysis Basic - ( 15 Mar 2024 06:45 )    Color: x / Appearance: x / SG: x / pH: x  Gluc: 109 mg/dL / Ketone: x  / Bili: x / Urobili: x   Blood: x / Protein: x / Nitrite: x   Leuk Esterase: x / RBC: x / WBC x   Sq Epi: x / Non Sq Epi: x / Bacteria: x        Sodium, Serum: 142 (03-15 @ 06:45)  Sodium, Serum: 140 (03-14 @ 06:20)  Sodium, Serum: 140 (03-13 @ 06:04)  Sodium, Serum: 140 (03-12 @ 23:25)    Creatinine, Serum: 2.30 (03-15 @ 06:45)  Creatinine, Serum: 2.40 (03-14 @ 06:20)  Creatinine, Serum: 2.00 (03-13 @ 06:04)  Creatinine, Serum: 2.00 (03-12 @ 23:25)    Potassium, Serum: 3.6 (03-15 @ 06:45)  Potassium, Serum: 3.6 (03-14 @ 06:20)  Potassium, Serum: 3.8 (03-13 @ 06:04)  Potassium, Serum: 4.3 (03-12 @ 23:25)    Hemoglobin: 12.4 (03-15 @ 06:45)  Hemoglobin: 12.8 (03-14 @ 06:20)  Hemoglobin: 13.4 (03-13 @ 06:04)  Hemoglobin: 12.4 (03-12 @ 23:25)      < from: US Kidney and Bladder (03.15.24 @ 08:32) >    ACC: [id number] EXAM:  US KIDNEYS AND BLADDER   ORDERED BY: DEBBIE BIGGS     PROCEDURE DATE:  03/15/2024          INTERPRETATION:  CLINICAL INFORMATION: Urinary retention    COMPARISON: None available.    TECHNIQUE: Sonography of the kidneys and bladder.    FINDINGS:  Right kidney: 9.1 cm. No renal mass, hydronephrosis or calculi.    Left kidney: 10.1 cm. No renal mass, hydronephrosis or calculi.    Urinary bladder: Not adequately distended.  Prostate is markedly enlarged (volume of 120 cc).    IMPRESSION:  No hydronephrosis bilaterally.  Marked prostatomegaly.        --- End of Report ---            ROSE CIFUENTES MD; Attending Radiologist  This document has been electronically signed. Mar 15 2024  8:52AM    < end of copied text >

## 2024-03-15 NOTE — DISCHARGE NOTE NURSING/CASE MANAGEMENT/SOCIAL WORK - NSDCPEFALRISK_GEN_ALL_CORE
For information on Fall & Injury Prevention, visit: [email or web address] OR  [email or web address] OR  https://www.cdc.gov/steadi/patient.html

## 2024-03-15 NOTE — PROGRESS NOTE ADULT - ASSESSMENT
74yoM with a PMH of CAD sp PCI/ANAHI x 2 to LAD/Ramus 2016, MI 2016, HTN, HLD, AF on AC, CKD, who presents with SOB, JORDAN and orthopnea    pericardial eff  ckd  anemia  OP  OA  pleural eff  atelectasis  dyspepsia  cad  HTN  HLD  AF    am Cr pending  renal eval noted  lasix on HOLD  vs noted  check RA sat    ECHO 2/23/24 showed normal LV & RV size and function EF 57 %, increased LV mass and concentric hypertrophy, severely dilated LA, Mod MR, mild AR, Small pericardial effusion  cardio eval noted  GI eval noted  cxr and TTE and CT chest reviewed  monitor VS and HD and Sat  I and O  replete lytes  serial renal indices  dietary discretion  on DOAC for AF  AF - rate and rhythm control

## 2024-03-15 NOTE — PROGRESS NOTE ADULT - SUBJECTIVE AND OBJECTIVE BOX
[facility] -- Roxi Pardo Pannella, Patel, Savella, Goodger, Cohen  Office # [phone]    Follow Up:  CHF    Subjective/Observations: seen and examined, awake, alert, resting comfortably in bed, denies chest pain, dyspnea, palpitations or dizziness, orthopnea and PND. on O2 via NC, no events overnight    REVIEW OF SYSTEMS: All other review of systems is negative unless indicated above  PAST MEDICAL & SURGICAL HISTORY:  Essential hypertension      Gastroesophageal reflux disease, esophagitis presence not specified      HTN (hypertension)      Hyperlipidemia      CAD (coronary artery disease)  stents x 2      CAD S/P percutaneous coronary angioplasty  with ANAHI        MEDICATIONS  (STANDING):  aspirin enteric coated 81 milliGRAM(s) Oral daily  atorvastatin 40 milliGRAM(s) Oral at bedtime  carvedilol 25 milliGRAM(s) Oral every 12 hours  diltiazem    milliGRAM(s) Oral daily  folic acid 1 milliGRAM(s) Oral daily  influenza  Vaccine (HIGH DOSE) 0.7 milliLiter(s) IntraMuscular once  pantoprazole  Injectable 40 milliGRAM(s) IV Push every 12 hours  rivaroxaban 15 milliGRAM(s) Oral with dinner  sucralfate 1 Gram(s) Oral every 6 hours    MEDICATIONS  (PRN):  acetaminophen     Tablet .. 650 milliGRAM(s) Oral every 6 hours PRN Temp greater or equal to 38C (100.4F), Mild Pain (1 - 3)  aluminum hydroxide/magnesium hydroxide/simethicone Suspension 30 milliLiter(s) Oral every 4 hours PRN Dyspepsia  melatonin 3 milliGRAM(s) Oral at bedtime PRN Insomnia  ondansetron Injectable 4 milliGRAM(s) IV Push every 6 hours PRN Nausea and/or Vomiting    Allergies    No Known Allergies    Intolerances      Vital Signs Last 24 Hrs  T(C): 36.3 (15 Mar 2024 04:44), Max: 36.6 (14 Mar 2024 11:10)  T(F): 97.4 (15 Mar 2024 04:44), Max: 97.9 (14 Mar 2024 11:10)  HR: 74 (15 Mar 2024 04:44) (62 - 74)  BP: 120/76 (15 Mar 2024 04:44) (120/76 - 126/70)  BP(mean): --  RR: 18 (15 Mar 2024 04:44) (18 - 18)  SpO2: 97% (15 Mar 2024 04:44) (95% - 98%)    Parameters below as of 15 Mar 2024 04:44  Patient On (Oxygen Delivery Method): nasal cannula  O2 Flow (L/min): 2    I&O's Summary    14 Mar 2024 07:01  -  15 Mar 2024 07:00  --------------------------------------------------------  IN: 820 mL / OUT: 900 mL / NET: -80 mL          TELE: Not on telemetry   PHYSICAL EXAM:  Constitutional: NAD, awake and alert  HEENT: Moist Mucous Membranes, Anicteric  Pulmonary: Non-labored, breath sounds are clear diminished at bases, bilaterally, No wheezing, rales or rhonchi  Cardiovascular: IRRR, S1 and S2, + murmurs, rubs, gallops or clicks  Gastrointestinal: Bowel Sounds present, soft, nontender.   Lymph: No peripheral edema. No lymphadenopathy.  Skin: No visible rashes or ulcers.  Psych:  Mood & affect appropriate  LABS: All Labs Reviewed:                        12.4   7.74  )-----------( 233      ( 15 Mar 2024 06:45 )             38.4                         12.8   8.24  )-----------( 236      ( 14 Mar 2024 06:20 )             40.3                         13.4   11.15 )-----------( 250      ( 13 Mar 2024 06:04 )             41.7     15 Mar 2024 06:45    142    |  107    |  38     ----------------------------<  109    3.6     |  26     |  2.30   14 Mar 2024 06:20    140    |  104    |  38     ----------------------------<  123    3.6     |  26     |  2.40   13 Mar 2024 06:04    140    |  108    |  36     ----------------------------<  134    3.8     |  22     |  2.00     Ca    8.9        15 Mar 2024 06:45  Ca    8.6        14 Mar 2024 06:20  Ca    8.7        13 Mar 2024 06:04  Phos  3.3       15 Mar 2024 06:45  Mg     2.1       15 Mar 2024 06:45  Mg     2.1       14 Mar 2024 06:20  Mg     2.1       13 Mar 2024 06:04    TPro  7.3    /  Alb  3.7    /  TBili  0.5    /  DBili  x      /  AST  30     /  ALT  37     /  AlkPhos  109    12 Mar 2024 23:25          12 Lead ECG:   Ventricular Rate 122 BPM    Atrial Rate 312 BPM    QRS Duration 92 ms    Q-T Interval 292 ms    QTC Calculation(Bazett) 416 ms    R Axis -25 degrees    T Axis 38 degrees    Diagnosis Line Atrial flutter with variable AV block  Abnormal ECG  When compared with ECG of 21-DEC-2023 03:17,  Atrial flutter has replaced Sinus rhythm  Vent. rate has increased BY  51 BPM  Confirmed by Bryn Diane MD ([id number]) on 3/13/2024 12:27:45 PM (03-12-24 @ 23:27)      TRANSTHORACIC ECHOCARDIOGRAM REPORT  ________________________________________________________________________________                                      _______       Pt. Name:       TRUDI COOLEY Study Date:    12/21/2023  MRN:            [id number]      YOB: 1950  Accession #:    [id number]     Age:           73 years  Account#:       [id number]    Gender:        M  Heart Rate:                   Height:        64.17 in (163.00 cm)  Rhythm:                       Weight:        189.59 lb (86.00 kg)  Blood Pressure: 136/61 mmHg   BSA/BMI:       1.92 m² / 32.37 kg/m²  ________________________________________________________________________________________  Referring Physician: [id number] Claudy Real  Primary Sonographer: Yessica Rdz RDCS    CPT:               ECHO TTE WO CON COMP W DOPP - 58262.m  Indication(s):     Dyspnea, unspecified - R06.00  Procedure:         Transthoracic echocardiogram with 2-D, M-mode and complete                     spectral and color flow Doppler.  Ordering Location: [facility]  Study Information: Image quality for this study is technically difficult.    _______________________________________________________________________________________     CONCLUSIONS:      1. Technically difficult image quality.   2. Left ventricular systolic function is low-normal with a LVEF estimated at 50-55%.   3. Normal right ventricular cavity size, wall thickness, and systolic function.   4. Mild mitral regurgitation.   5. Mild aortic regurgitation.   6. Tracetricuspid regurgitation.   7. Small pericardial effusion noted adjacent to the posterior left ventricle associated with a large anterior fat pad, without diastolic RV collapse.   8. The inferior vena cava is normal in size measuring 1.33 cm in diameter, (normal <2.1cm) with normal inspiratory collapse (normal >50%) consistent with normal right atrial pressure (~3, range 0-5mmHg).    ________________________________________________________________________________________  FINDINGS:     Left Ventricle:  Left ventricular systolic function is low normal with a calculated ejection fraction of 50-55% by the Burns's biplane method of disks. The left ventricular diastolic function is indeterminate.     Right Ventricle:  The right ventricular cavityis normal in size, normal wall thickness and normal systolic function.     Left Atrium:  The left atrium is normal.     Right Atrium:  The right atrium is normal in size with an indexed volume of 23.64 ml/m².     Aortic Valve:  The aortic valve anatomy cannot be determined with normal systolic excursion. There is moderate calcification of the aortic valve leaflets. There is no aortic valve stenosis. There is mild aortic regurgitation. AI VMax is 4.49 m/s. AI pressure half time is 602 msec. AI slope is 2.18 m/s².     Mitral Valve:  Structurally normal mitral valve with normal leaflet excursion. There is mild posterior calcification of the mitral valve annulus. There is mild mitral regurgitation.     Tricuspid Valve:  Structurally normal tricuspid valve with normal leaflet excursion. There is trace tricuspid regurgitation.     Pulmonic Valve:  The pulmonic valve was not well visualized.     Aorta:  The aortic root at the sinuses of Valsalva is normal in size, measuring 2.80 cm (indexed 1.46cm/m²). The aortic arch diameter is normal in size, measuring 2.2 cm (indexed 1.15 cm/m²).     Pericardium:  There is a small pericardial effusion noted adjacent to the posterior left ventricle.     Systemic Veins:  The inferior vena cava is normal in size measuring 1.33 cm in diameter, (normal <2.1cm) with normal inspiratory collapse (normal >50%) consistent with normal right atrial pressure (~3, range 0-5mmHg).  ____________________________________________________________________  QUANTITATIVE DATA:  Left Ventricle Measurements: (Indexed to BSA)     IVSd (2D):   1.1 cm  LVPWd (2D):  1.1 cm  LVIDd (2D):  4.9 cm  LVIDs (2D):  3.7 cm  LV Mass:     209 g  109.0 g/m²  LV Vol d, MOD A2C: 130.0 ml 67.85 ml/m²  LV Vol d, MOD A4C: 128.0 ml 66.81 ml/m²  LV Vol d, MOD BP:  129.5 ml 67.60 ml/m²  LV Vol s, MOD A2C: 63.4 ml  33.09 ml/m²  LV Vol s, MOD A4C: 67.1 ml  35.02 ml/m²  LV Vol s, MOD BP:  65.3 ml  34.06 ml/m²  LVOT SV MOD BP:    64.3 ml  LV EF% MOD BP:     50 %     MV E Vmax:    0.98 m/s  MV A Vmax:    0.39 m/s  MV E/A:       2.51  e' lateral:   9.14 cm/s  e' medial:    7.18 cm/s  E/e' lateral: 10.78  E/e' medial:  13.72  E/e' Average: 12.07  MV DT:        208 msec    Aorta Measurements: (normal range) (Indexed to BSA)     Sinuses of Valsalva: 2.80 cm (3.1 - 3.7 cm)  Ao Arch:             2.2 cm       Left Atrium Measurements: (Indexed to BSA)  LA Diam 2D: 3.50 cm    Right Atrial Measurements:     RA Vol:       45.30 ml  RA Vol Index: 23.64 ml/m²       LVOT / RVOT/ Qp/Qs Data: (Indexedto BSA)  LVOT Diameter: 2.10 cm    Aortic Valve Measurements:  AR Vmax  4.49 m/s  AR PHT   602 msec  AR Grundy 2.18 m/s²    Mitral Valve Measurements:     MV E Vmax: 1.0 m/s         MR Vmax:          5.11 m/s  MV A Vmax: 0.4 m/s         MR Peak Gradient: 104.4 mmHg  MV E/A:    2.5       Tricuspid Valve Measurements:     RA Pressure: 3 mmHg    ________________________________________________________________________________________  Electronically signed on 12/21/2023 at 5:11:05 PM by Maggie Colin MD         *** Final ***

## 2024-03-15 NOTE — PROGRESS NOTE ADULT - PROBLEM SELECTOR PLAN 2
Likely 2/2 to fluid overload   Improved  Tolerating RA  Oxygen prn  Pulmonary f/u
Likely 2/2 to fluid overload  Improving  Tolerating RA  Oxygen prn  Pulmonary f/u

## 2024-03-15 NOTE — PROGRESS NOTE ADULT - PROBLEM SELECTOR PLAN 1
Clinically improving   Patient to stay off lasix until seen by cardio next week  Continue Coreg  Patient for elective cath as outpatient next week  Cardio f/u  Further work-up/management pending clinical course.
Clinically improving   Hold lasix 2/2 LEOBARDO; re-evaluate in AM  Repeat ECHO  Continue Coreg  Cardio f/u  Further work-up/management pending clinical course.

## 2024-03-15 NOTE — PROGRESS NOTE ADULT - SUBJECTIVE AND OBJECTIVE BOX
[facility]  Gene Asencio PA-C  [address] [place] [address]  [phone]      INTERVAL HPI/OVERNIGHT EVENTS:  Pt s/e  Epigastric discomfort resolving  No new GI events    MEDICATIONS  (STANDING):  aspirin enteric coated 81 milliGRAM(s) Oral daily  atorvastatin 40 milliGRAM(s) Oral at bedtime  carvedilol 25 milliGRAM(s) Oral every 12 hours  diltiazem    milliGRAM(s) Oral daily  folic acid 1 milliGRAM(s) Oral daily  influenza  Vaccine (HIGH DOSE) 0.7 milliLiter(s) IntraMuscular once  pantoprazole  Injectable 40 milliGRAM(s) IV Push every 12 hours  rivaroxaban 15 milliGRAM(s) Oral with dinner  sucralfate 1 Gram(s) Oral every 6 hours    MEDICATIONS  (PRN):  acetaminophen     Tablet .. 650 milliGRAM(s) Oral every 6 hours PRN Temp greater or equal to 38C (100.4F), Mild Pain (1 - 3)  aluminum hydroxide/magnesium hydroxide/simethicone Suspension 30 milliLiter(s) Oral every 4 hours PRN Dyspepsia  melatonin 3 milliGRAM(s) Oral at bedtime PRN Insomnia  ondansetron Injectable 4 milliGRAM(s) IV Push every 6 hours PRN Nausea and/or Vomiting      Allergies  No Known Allergies      PHYSICAL EXAM:   Vital Signs:  Vital Signs Last 24 Hrs  T(C): 36.3 (15 Mar 2024 04:44), Max: 36.6 (14 Mar 2024 11:10)  T(F): 97.4 (15 Mar 2024 04:44), Max: 97.9 (14 Mar 2024 11:10)  HR: 74 (15 Mar 2024 04:44) (62 - 74)  BP: 120/76 (15 Mar 2024 04:44) (120/76 - 126/70)  BP(mean): --  RR: 18 (15 Mar 2024 04:44) (18 - 18)  SpO2: 97% (15 Mar 2024 04:44) (95% - 98%)    Parameters below as of 15 Mar 2024 04:44  Patient On (Oxygen Delivery Method): nasal cannula  O2 Flow (L/min): 2    GENERAL:  Appears stated age  HEENT:  NC/AT  CHEST:  Full & symmetric excursion  HEART:  Regular rhythm  ABDOMEN:  Soft, non-tender, non-distended  EXTEREMITIES:  no cyanosis  SKIN:  No rash  NEURO:  Alert      LABS:                        12.4   7.74  )-----------( 233      ( 15 Mar 2024 06:45 )             38.4     03-15    142  |  107  |  38<H>  ----------------------------<  109<H>  3.6   |  26  |  2.30<H>    Ca    8.9      15 Mar 2024 06:45  Phos  3.3     03-15  Mg     2.1     03-15        Urinalysis Basic - ( 15 Mar 2024 06:45 )    Color: x / Appearance: x / SG: x / pH: x  Gluc: 109 mg/dL / Ketone: x  / Bili: x / Urobili: x   Blood: x / Protein: x / Nitrite: x   Leuk Esterase: x / RBC: x / WBC x   Sq Epi: x / Non Sq Epi: x / Bacteria: x

## 2024-03-15 NOTE — CASE MANAGEMENT PROGRESS NOTE - NSCMPROGRESSNOTE_GEN_ALL_CORE
Per MD, patient is medically cleared for discharge home today. CM again met with the patient at the bedside to discuss transition planning and encouraged home care services for a visiting nurse. Patient again declined. CM noted that the patient has a follow up appointment with Dr. Gillespie on 4/12/24. CM offered to assist with scheduling a sooner appointment. Patient receptive. CM contacted Dr. Gillespie's office [phone], spoke to Gypsy, who stated the soonest appointment is 3/27/24 at 2:30pm with Dr. Gillespie. CM attempted to get a sooner appointment post discharge for CHF. Gypsy stated if they have a cancellation they will contact the patient. CM made patient aware and provided appointment information on paper and documented appointment date and time on discharge documentation. CM encouraged the patient to call the office next week. Patient verbalized understanding of the transition plan and is in agreement. IMM explained and provided to the patient. Patient's spouse will transport home. Bedside RN aware of plan. CM remains available.    Per MD, patient is medically cleared for discharge home today. CM again met with the patient at the bedside to discuss transition planning and encouraged home care services for a visiting nurse. Patient is on room air. Patient again declined. CM noted that the patient has a follow up appointment with Dr. Gillespie on 4/12/24. CM offered to assist with scheduling a sooner appointment. Patient receptive. CM contacted Dr. Gillespie's office [phone], spoke to Gypsy, who stated the soonest appointment is 3/27/24 at 2:30pm with Dr. Gillespie. CM attempted to get a sooner appointment post discharge for CHF. Gypsy stated if they have a cancellation they will contact the patient. CM made patient aware and provided appointment information on paper and documented appointment date and time on discharge documentation. CM encouraged the patient to call the office next week. Patient verbalized understanding of the transition plan and is in agreement. IMM explained and provided to the patient. Patient's spouse will transport home. Bedside RN aware of plan. CM remains available.

## 2024-03-15 NOTE — PROGRESS NOTE ADULT - PROBLEM SELECTOR PLAN 4
Continue Xarelto  Continue Cardizem for rate control
Continue Xarelto  Continue Cardizem for rate control

## 2024-03-15 NOTE — DISCHARGE NOTE PROVIDER - NSDCFUSCHEDAPPT_GEN_ALL_CORE_FT
Ezekiel Gillespie  [facility]  CARDIOLOGY [address] [facility]  Scheduled Appointment: 04/12/2024

## 2024-03-15 NOTE — PROGRESS NOTE ADULT - SUBJECTIVE AND OBJECTIVE BOX
Date/Time Patient Seen:  		  Referring MD:   Data Reviewed	       Patient is a 74y old  Male who presents with a chief complaint of SOB (14 Mar 2024 14:19)      Subjective/HPI     PAST MEDICAL & SURGICAL HISTORY:  Essential hypertension    Gastroesophageal reflux disease, esophagitis presence not specified    HTN (hypertension)    Hyperlipidemia    CAD (coronary artery disease)  stents x 2    No significant past surgical history    No significant past surgical history    CAD S/P percutaneous coronary angioplasty  with ANAHI          Medication list         MEDICATIONS  (STANDING):  aspirin enteric coated 81 milliGRAM(s) Oral daily  atorvastatin 40 milliGRAM(s) Oral at bedtime  carvedilol 25 milliGRAM(s) Oral every 12 hours  diltiazem    milliGRAM(s) Oral daily  folic acid 1 milliGRAM(s) Oral daily  influenza  Vaccine (HIGH DOSE) 0.7 milliLiter(s) IntraMuscular once  pantoprazole  Injectable 40 milliGRAM(s) IV Push every 12 hours  rivaroxaban 15 milliGRAM(s) Oral with dinner  sucralfate 1 Gram(s) Oral every 6 hours    MEDICATIONS  (PRN):  acetaminophen     Tablet .. 650 milliGRAM(s) Oral every 6 hours PRN Temp greater or equal to 38C (100.4F), Mild Pain (1 - 3)  aluminum hydroxide/magnesium hydroxide/simethicone Suspension 30 milliLiter(s) Oral every 4 hours PRN Dyspepsia  melatonin 3 milliGRAM(s) Oral at bedtime PRN Insomnia  ondansetron Injectable 4 milliGRAM(s) IV Push every 6 hours PRN Nausea and/or Vomiting         Vitals log        ICU Vital Signs Last 24 Hrs  T(C): 36.3 (15 Mar 2024 04:44), Max: 36.6 (14 Mar 2024 11:10)  T(F): 97.4 (15 Mar 2024 04:44), Max: 97.9 (14 Mar 2024 11:10)  HR: 74 (15 Mar 2024 04:44) (62 - 74)  BP: 120/76 (15 Mar 2024 04:44) (120/76 - 126/70)  BP(mean): --  ABP: --  ABP(mean): --  RR: 18 (15 Mar 2024 04:44) (18 - 18)  SpO2: 97% (15 Mar 2024 04:44) (95% - 98%)    O2 Parameters below as of 15 Mar 2024 04:44  Patient On (Oxygen Delivery Method): nasal cannula  O2 Flow (L/min): 2               Input and Output:  I&O's Detail    13 Mar 2024 07:01  -  14 Mar 2024 07:00  --------------------------------------------------------  IN:    Oral Fluid: 200 mL  Total IN: 200 mL    OUT:  Total OUT: 0 mL    Total NET: 200 mL      14 Mar 2024 07:01  -  15 Mar 2024 05:33  --------------------------------------------------------  IN:    Oral Fluid: 620 mL  Total IN: 620 mL    OUT:    Voided (mL): 900 mL  Total OUT: 900 mL    Total NET: -280 mL          Lab Data                        12.8   8.24  )-----------( 236      ( 14 Mar 2024 06:20 )             40.3     03-14    140  |  104  |  38<H>  ----------------------------<  123<H>  3.6   |  26  |  2.40<H>    Ca    8.6      14 Mar 2024 06:20  Mg     2.1     03-14        CARDIAC MARKERS ( 13 Mar 2024 06:04 )  x     / x     / 81 U/L / x     / 1.2 ng/mL        Review of Systems	      Objective     Physical Examination    heart s1s2  lung dec BS  head nc      Pertinent Lab findings & Imaging      Molly:  NO   Adequate UO     I&O's Detail    13 Mar 2024 07:01  -  14 Mar 2024 07:00  --------------------------------------------------------  IN:    Oral Fluid: 200 mL  Total IN: 200 mL    OUT:  Total OUT: 0 mL    Total NET: 200 mL      14 Mar 2024 07:01  -  15 Mar 2024 05:33  --------------------------------------------------------  IN:    Oral Fluid: 620 mL  Total IN: 620 mL    OUT:    Voided (mL): 900 mL  Total OUT: 900 mL    Total NET: -280 mL               Discussed with:     Cultures:	        Radiology

## 2024-03-15 NOTE — PROGRESS NOTE ADULT - PROVIDER SPECIALTY LIST ADULT
Cardiology
Gastroenterology
Pulmonology
Gastroenterology
Nephrology
Pulmonology
Cardiology
Internal Medicine
Internal Medicine

## 2024-03-15 NOTE — PROGRESS NOTE ADULT - NS ATTEND AMEND GEN_ALL_CORE FT
74yM with a PMH of CAD sp PCI/ANAHI x 2 to LAD/Ramus 2016, MI 2016, HTN, HLD, AF on AC, CKD, who presents with SOB, JORDAN and orthopnea, admitted with acute on chr CHF exacerbation.     - BNP 1065; CXR w/ diffuse moderate CHF   - CT Chest nonspecific bilateral ground glass parenchymal opacification. Differential diagnosis includes pulmonary edema and inflammatory/infectious etiologies. Small bilateral pleural effusions with dependent atelectasis. Small-to-moderate pericardial effusion  - Euvolemic on exam, remains on O2 supp, wean as tolerated.  Non-orthopneic with no edema  - +LEOBARDO , s/p Lasix IV  - start Lasix PO once creatinine improves  - Continue Coreg 25 mg PO BID  - Continue Cardizem  mg daily  - Continue AC with Xarelto  - no anginal complaints   - Continue ASA and statin
74yM with a PMH of CAD sp PCI/ANAHI x 2 to LAD/Ramus 2016, MI 2016, HTN, HLD, AF on AC, CKD, who presents with SOB, JORDAN and orthopnea, admitted with acute on chr CHF exacerbation.     SOB, JORDAN and orthopnea with elevated bnp and hf on cxr/ct  lasix recently held for worse creat  S/p IV lasix on 3/13 and this AM  Creatinine now uptrending and he appears euvolemic on exam  Stop IV lasix for today. Will re-eval volume status in AM  ECHO 2/23/24 showed normal LV & RV size and function EF 57 %, increased LV mass and concentric hypertrophy, severely dilated LA, Mod MR, mild AR, Small pericardial effusion, no need to repeat  af accel vr on tele  has gen been in sr though at office visit last week was in af 100  recurrent af might be responsible for his hf  Diltiazem was added to his coreg 25 bid  Would switch his diltiazem to long acting  cont ac, on renally dosed Xarelto  might need to consider restoration of sr to avoid hf, pending clinical course  known cad without acute ischemia. Continue ASA, statin.

## 2024-03-15 NOTE — PROGRESS NOTE ADULT - ASSESSMENT
CKD 3, CRS  CHF  Hypertension    03/14/24: Mild increase in creatinine with diuresis. Will follow creatinine trend. Will get kidney and bladder sonogram.   PO fluid restriction. Monitor BP trend. Titrate BP meds as needed. Cardiology follow up. Avoid nephrotoxic meds as possible.   D/w patient regarding need for out patient nephrology follow up.   03/15/24: Stable renal indices. Will resume on low dose lasix and monitor. Cardiology follow up. D/c planning. Pt to follow up in the office.

## 2024-03-15 NOTE — ED ADULT NURSE NOTE - CCCP TRG CHIEF CMPLNT
abdominal pain [Mother] : mother [Yes] : Patient goes to dentist yearly [Up to date] : Up to date [Eats meals with family] : eats meals with family [Has family members/adults to turn to for help] : has family members/adults to turn to for help [Is permitted and is able to make independent decisions] : Is permitted and is able to make independent decisions [Normal Performance] : normal performance [Normal Behavior/Attention] : normal behavior/attention [Normal Homework] : normal homework [Eats regular meals including adequate fruits and vegetables] : eats regular meals including adequate fruits and vegetables [Drinks non-sweetened liquids] : drinks non-sweetened liquids  [Calcium source] : calcium source [Has friends] : has friends [At least 1 hour of physical activity a day] : at least 1 hour of physical activity a day [Screen time (except homework) less than 2 hours a day] : screen time (except homework) less than 2 hours a day [Has interests/participates in community activities/volunteers] : has interests/participates in community activities/volunteers. [Uses safety belts/safety equipment] : uses safety belts/safety equipment  [Has peer relationships free of violence] : has peer relationships free of violence [No] : Patient has not had sexual intercourse [Has ways to cope with stress] : has ways to cope with stress [Displays self-confidence] : displays self-confidence [With Teen] : teen [With Parent/Guardian] : parent/guardian [Grade: ____] : Grade: [unfilled] [Sleep Concerns] : no sleep concerns [Has concerns about body or appearance] : does not have concerns about body or appearance [Uses electronic nicotine delivery system] : does not use electronic nicotine delivery system [Exposure to electronic nicotine delivery system] : no exposure to electronic nicotine delivery system [Uses tobacco] : does not use tobacco [Exposure to tobacco] : no exposure to tobacco [Uses drugs] : does not use drugs  [Exposure to drugs] : no exposure to drugs [Drinks alcohol] : does not drink alcohol [Exposure to alcohol] : no exposure to alcohol [Impaired/distracted driving] : no impaired/distracted driving [Has problems with sleep] : does not have problems with sleep [Gets depressed, anxious, or irritable/has mood swings] : does not get depressed, anxious, or irritable/has mood swings [Has thought about hurting self or considered suicide] : has not thought about hurting self or considered suicide [FreeTextEntry7] : N/C [de-identified] : None [FreeTextEntry1] : Preston is a healthy 19-year-old adolescent here for well care

## 2024-03-15 NOTE — DISCHARGE NOTE PROVIDER - HOSPITAL COURSE
This is a 74yoM with a PMH of CAD sp PCI/ANAHI x 2 to LAD/[place] 2016, MI 2016, HTN, HLD, AF on AC, CKD, who presents with SOB, JORDAN and orthopnea for last few days. HE has been taking his lasix. He denies chest pain, fevers, or cough.    Patient admitted with acute on chronic HFpEF and acute hypoxic respiratory failure  Patient improved with iv lasix.   Tolerating RA  Patient with LEOBARDO  Will go home on low dose lasix  Will get outpatient cath next week  Cleared by cardio for d/c home    >35 minutes spent on discharge

## 2024-03-15 NOTE — DISCHARGE NOTE PROVIDER - NSDCMRMEDTOKEN_GEN_ALL_CORE_FT
aspirin 81 mg oral tablet: 1 tab(s) orally once a day  atorvastatin 40 mg oral tablet: 1 tab(s) orally once a day (at bedtime)  carvedilol 25 mg oral tablet: 1 tab(s) orally 2 times a day  dilTIAZem 120 mg/24 hours oral capsule, extended release: 1 cap(s) orally once a day  folic acid 1 mg oral tablet: 1 tab(s) orally once a day  furosemide 20 mg oral tablet: 1 tab(s) orally once a day  Protonix 40 mg oral delayed release tablet: 1 tab(s) orally once a day  Xarelto 20 mg oral tablet: 1 tab(s) orally once a day

## 2024-03-16 ENCOUNTER — TRANSCRIPTION ENCOUNTER (OUTPATIENT)
Age: 74
End: 2024-03-16

## 2024-03-20 ENCOUNTER — APPOINTMENT (OUTPATIENT)
Dept: CARDIOLOGY | Facility: CLINIC | Age: 74
End: 2024-03-20
Payer: COMMERCIAL

## 2024-03-20 VITALS
BODY MASS INDEX: 31.07 KG/M2 | HEART RATE: 64 BPM | DIASTOLIC BLOOD PRESSURE: 90 MMHG | HEIGHT: 64 IN | WEIGHT: 182 LBS | SYSTOLIC BLOOD PRESSURE: 163 MMHG | OXYGEN SATURATION: 98 %

## 2024-03-20 VITALS — SYSTOLIC BLOOD PRESSURE: 134 MMHG | DIASTOLIC BLOOD PRESSURE: 80 MMHG

## 2024-03-20 DIAGNOSIS — I25.10 ATHEROSCLEROTIC HEART DISEASE OF NATIVE CORONARY ARTERY W/OUT ANGINA PECTORIS: ICD-10-CM

## 2024-03-20 PROCEDURE — 93000 ELECTROCARDIOGRAM COMPLETE: CPT

## 2024-03-20 PROCEDURE — 99215 OFFICE O/P EST HI 40 MIN: CPT

## 2024-03-20 PROCEDURE — G2211 COMPLEX E/M VISIT ADD ON: CPT

## 2024-03-20 RX ORDER — HYDRALAZINE HYDROCHLORIDE 25 MG/1
25 TABLET ORAL
Qty: 270 | Refills: 3 | Status: DISCONTINUED | COMMUNITY
Start: 1900-01-01 | End: 2024-03-20

## 2024-03-20 NOTE — CARDIOLOGY SUMMARY
[de-identified] : [facility] ECHO CONCLUSIONS:    1. Technically difficult image quality.  2. Left ventricular systolic function is low-normal with a LVEF estimated at 50-55%.  3. Normal right ventricular cavity size, wall thickness, and systolic function.  4. Mild mitral regurgitation.  5. Mild aortic regurgitation.  6. Trace tricuspid regurgitation.  7. Small pericardial effusion noted adjacent to the posterior left ventricle associated with a large anterior fat pad, without diastolic RV collapse.  8. The inferior vena cava is normal in size measuring 1.33 cm in diameter, (normal <2.1cm) with normal inspiratory collapse (normal >50%) consistent with normal right atrial pressure (~3, range 0-5mmHg).  [de-identified] : NSR [___] : [unfilled] [None] : normal LV function

## 2024-03-20 NOTE — DISCUSSION/SUMMARY
[FreeTextEntry1] : This is a 73 year old man with a history of CAD s/p PCI to the proximal LAD and RI in January of 2016, hypertension PAF, and hyperlipidemia who presents to the office for follow up.     He was admitted with an ADHF event in the setting of uncontrolled HTN in December of 2023.  He was again admitted with AF with RVR and acute diastolic CHF on March of 2024.  He needs an angiogram to exclude CAD.  Once this is excluded, he may need an AF ablation.  He arrives today in no acute distress .  He is euvolemic on exam, compensated from a volume perspective.    His blood pressure is controlled.  He will continue carvedilol 25 bid and diltiazem 120 QD>   Echocardiogram from his hospitalization demonstrated a normal LVEF without wall motion abnormalities but small pericardial effusion.  His repeat showed normal LV function, moderate MR, and severe LAE.    He will continue Lasix 20 QD.   He will continue ASA and atorvastatin 40mg daily for goal LDL <70.  He will continue xarelto 20mg daily for thromboembolic prevention.  He will followup again in one month.  [EKG obtained to assist in diagnosis and management of assessed problem(s)] : EKG obtained to assist in diagnosis and management of assessed problem(s)

## 2024-03-20 NOTE — HISTORY OF PRESENT ILLNESS
[FreeTextEntry1] : This is a 74 year old man with a history of CAD s/p PCI to the proximal LAD and RI in January of 2016, hypertension and hyperlipidemia who presents to the office for follow up.  He underwent nuclear stress testing in January of 2019 and was found to have a 9 METS exercise capacity with no symptoms.  He had a large area of inferior and inferolateral infarct, along with a medium sized area of mild mid to distal anterior wall ischemia.  We discussed the options, and given that his defect was mild, he had a robust exercise tolerance, and no symptoms, we elected to manage this medically.  He has not had any further chest pains since.   He has also been diagnosed with PAF.   He presented to [facility] ED on 12/21/23, BIBEMS with SOB, JORDAN and congestion which has worsened in last day. C-xray was done and showed b/l infiltrates, thought to be PNA. CT chest also done, showed B/L infiltrates vs edema. He was started on antibiotics and given 500cc IVF. Overnight a rapid response was called for increasing SOB, hypoxia (SpO2 71% on 100% nrb) and hypertensive urgency. He was started on BiPap, given IV Lasix 80mg x1 and started on a Nitro gtt.  A bedside POCUS was done and showed +B-lines but no evidence of right heart strain. He was subsequently transferred to the ICU for suspected flash pulmonary edema.   IN March he was again admitted with pulmonary edema and AF with RVR.  He developed a mild LEOBARDO.  He improved quickly, and was discharged with outpatient follow up.  He was started on diltiazem, and hydralazine was stopped.    He arrives today feeling well. He denies chest pains or pressure. He  denies dizzy spells, feeling faint or fainting, He   denies palpitations or difficulty breathing while laying flat. He denies lower extremity swelling.  He saw his PMD.     Repeat echo in our office showed normal LV function, severe LAE, and moderate MR.

## 2024-03-20 NOTE — PHYSICAL EXAM
[Normal Appearance] : normal appearance [General Appearance - In No Acute Distress] : no acute distress [Normal Oral Mucosa] : normal oral mucosa [Normal Jugular Venous V Waves Present] : normal jugular venous V waves present [FreeTextEntry1] : No JVD [Exaggerated Use Of Accessory Muscles For Inspiration] : no accessory muscle use [Respiration, Rhythm And Depth] : normal respiratory rhythm and effort [Auscultation Breath Sounds / Voice Sounds] : lungs were clear to auscultation bilaterally [Bowel Sounds] : normal bowel sounds [Abdomen Soft] : soft [Abdomen Tenderness] : non-tender [Abnormal Walk] : normal gait [Gait - Sufficient For Exercise Testing] : the gait was sufficient for exercise testing [Cyanosis, Localized] : no localized cyanosis [Nail Clubbing] : no clubbing of the fingernails [Petechial Hemorrhages (___cm)] : no petechial hemorrhages [Skin Color & Pigmentation] : normal skin color and pigmentation [] : no rash [No Venous Stasis] : no venous stasis [Skin Lesions] : no skin lesions [Oriented To Time, Place, And Person] : oriented to person, place, and time [Affect] : the affect was normal [No Anxiety] : not feeling anxious [Mood] : the mood was normal [Not Palpable] : not palpable [No Precordial Heave] : no precordial heave was noted [Normal Rate] : normal [Irregularly Irregular] : irregularly irregular [2+] : left 2+ [Bruit] : no bruit heard [Well Developed] : well developed [Well Nourished] : well nourished [Normal Conjunctiva] : normal conjunctiva [No Acute Distress] : no acute distress [No Carotid Bruit] : no carotid bruit [Normal Venous Pressure] : normal venous pressure [Normal S1, S2] : normal S1, S2 [No Gallop] : no gallop [No Rub] : no rub [Rhythm Regular] : regular [Normal S1] : normal S1 [Normal S2] : normal S2 [No Murmur] : no murmurs heard [No Pitting Edema] : no pitting edema present [Right Carotid Bruit] : no bruit heard over the right carotid [Left Carotid Bruit] : no bruit heard over the left carotid [No Abnormalities] : the abdominal aorta was not enlarged and no bruit was heard [Clear Lung Fields] : clear lung fields [No Respiratory Distress] : no respiratory distress  [Good Air Entry] : good air entry [Soft] : abdomen soft [Non Tender] : non-tender [No Masses/organomegaly] : no masses/organomegaly [Normal Bowel Sounds] : normal bowel sounds [Normal Gait] : normal gait [No Edema] : no edema [No Cyanosis] : no cyanosis [No Clubbing] : no clubbing [No Varicosities] : no varicosities [No Skin Lesions] : no skin lesions [No Rash] : no rash [No Focal Deficits] : no focal deficits [Moves all extremities] : moves all extremities [Normal Speech] : normal speech [Alert and Oriented] : alert and oriented [Normal memory] : normal memory

## 2024-03-20 NOTE — REVIEW OF SYSTEMS
[SOB] : no shortness of breath [Chest Discomfort] : no chest discomfort [Dyspnea on exertion] : not dyspnea during exertion [Lower Ext Edema] : no extremity edema [Leg Claudication] : no intermittent leg claudication [Palpitations] : no palpitations [PND] : no PND [Orthopnea] : no orthopnea [Syncope] : no syncope [Negative] : Heme/Lymph

## 2024-03-27 ENCOUNTER — APPOINTMENT (OUTPATIENT)
Dept: CARDIOLOGY | Facility: CLINIC | Age: 74
End: 2024-03-27

## 2024-04-01 ENCOUNTER — TRANSCRIPTION ENCOUNTER (OUTPATIENT)
Age: 74
End: 2024-04-01

## 2024-04-02 ENCOUNTER — TRANSCRIPTION ENCOUNTER (OUTPATIENT)
Age: 74
End: 2024-04-02

## 2024-04-02 RX ORDER — DILTIAZEM HYDROCHLORIDE 120 MG/1
120 CAPSULE, EXTENDED RELEASE ORAL
Qty: 180 | Refills: 3 | Status: ACTIVE | COMMUNITY
Start: 2024-03-20

## 2024-04-05 ENCOUNTER — TRANSCRIPTION ENCOUNTER (OUTPATIENT)
Age: 74
End: 2024-04-05

## 2024-04-05 RX ORDER — CARVEDILOL PHOSPHATE 80 MG/1
1 CAPSULE, EXTENDED RELEASE ORAL
Refills: 0 | DISCHARGE

## 2024-04-05 RX ORDER — ATORVASTATIN CALCIUM 80 MG/1
1 TABLET, FILM COATED ORAL
Refills: 0 | DISCHARGE

## 2024-04-05 RX ORDER — PANTOPRAZOLE 40 MG/1
40 TABLET, DELAYED RELEASE ORAL
Qty: 90 | Refills: 1 | Status: ACTIVE | COMMUNITY
Start: 2024-04-02

## 2024-04-08 NOTE — H&P CARDIOLOGY - HISTORY OF PRESENT ILLNESS
74 year old male with PMH CAD (s/p PCI to the proximal LAD and RI in January of 2016), HTN, HFpEF, atrial fibrillation (on xarelto), HLD.    3/20/24 f/u cardiology appt with Dr MANASA Gillespie:   nuclear stress testing in January of 2019 and was found to have a 9 METS exercise capacity with no symptoms. He had a large area of inferior and inferolateral infarct, along with a medium sized area of mild mid to distal anterior wall ischemia. We discussed the options, and given that his defect was mild, he had a robust exercise tolerance, and no symptoms, we elected to manage this medically. He has not had any further chest pains since. He has also been diagnosed with PAF.     He presented to [place] ED on 12/21/23, BIBEMS with SOB, JORDAN and congestion which has worsened in last day. C-xray was done and showed b/l infiltrates, thought to be PNA. CT chest also done, showed B/L infiltrates vs edema. He was started on antibiotics and given 500cc IVF. Overnight a rapid response was called for increasing SOB, hypoxia (SpO2 71% on 100% nrb) and hypertensive urgency. He was started on BiPap, given IV Lasix 80mg x1 and started on a Nitro gtt. A bedside POCUS was done and showed +B-lines but no evidence of right heart strain. He was subsequently transferred to the ICU for suspected flash pulmonary edema.     IN March he was again admitted with pulmonary edema and AF with RVR. He developed a mild LEOBARDO. He improved quickly, and was discharged with outpatient follow up. He was started on diltiazem, and hydralazine was stopped.     He arrives today feeling well. He denies chest pains or pressure. He denies dizzy spells, feeling faint or fainting, He denies palpitations or difficulty breathing while laying flat. He denies lower extremity swelling. He saw his PMD. Repeat echo in our office showed normal LV function, severe LAE, and moderate MR.       < from: TTE W or WO Ultrasound Enhancing Agent (03.15.24 @ 10:20) >  CONCLUSIONS:   1. Left ventricular systolic function is normal with an ejection fraction of 53 % by Burns's method of disks.   2. Mild left ventricular hypertrophy.   3. Normal right ventricular cavity size, with normal wall thickness, and normal systolic function.   4. The left atrium is mildly dilated.   5. Trileaflet aortic valve with normal systolic excursion. There is mild calcification of the aortic valve leaflets.   6. Mild aortic regurgitation.   7. Mild mitral regurgitation.   8. Trace pulmonic regurgitation.   9. The inferior vena cava is normal in size measuring 1.33 cm in diameter, (normal <2.1cm) with normal inspiratory collapse (normal >50%) consistent with normal right atrial pressure (~3, range 0-5mmHg).  10. Small pericardial effusion with no evidence of hemodynamic compromise (or echocardiographic evidence of cardiac tamponade).  < end of copied text >    4/9/24 Pt presents today for elective LHC with Dr Anand. Feeling well, no c/o offered. Denies CP, SOB, palpitations, N/V, fever/chills, abd pain, numbness/tingling/weakness, other c/o at this time.    74 year old male with PMH CAD (s/p PCI to the proximal LAD and RI in January of 2016), HTN, HFpEF, atrial fibrillation (on xarelto), HLD, CKD (recent creat 3/2024 2-2.3).    3/20/24 f/u cardiology appt with Dr MANASA Gillespie:   nuclear stress testing in January of 2019 and was found to have a 9 METS exercise capacity with no symptoms. He had a large area of inferior and inferolateral infarct, along with a medium sized area of mild mid to distal anterior wall ischemia. We discussed the options, and given that his defect was mild, he had a robust exercise tolerance, and no symptoms, we elected to manage this medically. He has not had any further chest pains since. He has also been diagnosed with PAF.     He presented to [facility] ED on 12/21/23, BIBEMS with SOB, JORDAN and congestion which has worsened in last day. C-xray was done and showed b/l infiltrates, thought to be PNA. CT chest also done, showed B/L infiltrates vs edema. He was started on antibiotics and given 500cc IVF. Overnight a rapid response was called for increasing SOB, hypoxia (SpO2 71% on 100% nrb) and hypertensive urgency. He was started on BiPap, given IV Lasix 80mg x1 and started on a Nitro gtt. A bedside POCUS was done and showed +B-lines but no evidence of right heart strain. He was subsequently transferred to the ICU for suspected flash pulmonary edema.     IN March he was again admitted with pulmonary edema and AF with RVR. He developed a mild LEOBARDO. He improved quickly, and was discharged with outpatient follow up. He was started on diltiazem, and hydralazine was stopped.     He arrives today feeling well. He denies chest pains or pressure. He denies dizzy spells, feeling faint or fainting, He denies palpitations or difficulty breathing while laying flat. He denies lower extremity swelling. He saw his PMD. Repeat echo in our office showed normal LV function, severe LAE, and moderate MR.       < from: TTE W or WO Ultrasound Enhancing Agent (03.15.24 @ 10:20) >  CONCLUSIONS:   1. Left ventricular systolic function is normal with an ejection fraction of 53 % by Burns's method of disks.   2. Mild left ventricular hypertrophy.   3. Normal right ventricular cavity size, with normal wall thickness, and normal systolic function.   4. The left atrium is mildly dilated.   5. Trileaflet aortic valve with normal systolic excursion. There is mild calcification of the aortic valve leaflets.   6. Mild aortic regurgitation.   7. Mild mitral regurgitation.   8. Trace pulmonic regurgitation.   9. The inferior vena cava is normal in size measuring 1.33 cm in diameter, (normal <2.1cm) with normal inspiratory collapse (normal >50%) consistent with normal right atrial pressure (~3, range 0-5mmHg).  10. Small pericardial effusion with no evidence of hemodynamic compromise (or echocardiographic evidence of cardiac tamponade).  < end of copied text >    4/9/24 Pt presents today for elective LHC with Dr Anand. Feeling well, no c/o offered. Denies CP, SOB, palpitations, N/V, fever/chills, abd pain, numbness/tingling/weakness, other c/o at this time.    74 year old male with PMH CAD (s/p PCI to the proximal LAD and RI in January of 2016), HTN, HFpEF, atrial fibrillation (on xarelto), HLD, CKD (recent creat 3/2024 2-2.3).    3/20/24 f/u cardiology appt with Dr MANASA Gillespie:   nuclear stress testing in January of 2019 and was found to have a 9 METS exercise capacity with no symptoms. He had a large area of inferior and inferolateral infarct, along with a medium sized area of mild mid to distal anterior wall ischemia. We discussed the options, and given that his defect was mild, he had a robust exercise tolerance, and no symptoms, we elected to manage this medically. He has not had any further chest pains since. He has also been diagnosed with PAF.     He presented to [facility] ED on 12/21/23, BIBEMS with SOB, JORDAN and congestion which has worsened in last day. C-xray was done and showed b/l infiltrates, thought to be PNA. CT chest also done, showed B/L infiltrates vs edema. He was started on antibiotics and given 500cc IVF. Overnight a rapid response was called for increasing SOB, hypoxia (SpO2 71% on 100% nrb) and hypertensive urgency. He was started on BiPap, given IV Lasix 80mg x1 and started on a Nitro gtt. A bedside POCUS was done and showed +B-lines but no evidence of right heart strain. He was subsequently transferred to the ICU for suspected flash pulmonary edema.     IN March he was again admitted with pulmonary edema and AF with RVR. He developed a mild LEOBARDO. He improved quickly, and was discharged with outpatient follow up. He was started on diltiazem, and hydralazine was stopped.     He arrives today feeling well. He denies chest pains or pressure. He denies dizzy spells, feeling faint or fainting, He denies palpitations or difficulty breathing while laying flat. He denies lower extremity swelling. He saw his PMD. Repeat echo in our office showed normal LV function, severe LAE, and moderate MR.       < from: TTE W or WO Ultrasound Enhancing Agent (03.15.24 @ 10:20) >  CONCLUSIONS:   1. Left ventricular systolic function is normal with an ejection fraction of 53 % by Burns's method of disks.   2. Mild left ventricular hypertrophy.   3. Normal right ventricular cavity size, with normal wall thickness, and normal systolic function.   4. The left atrium is mildly dilated.   5. Trileaflet aortic valve with normal systolic excursion. There is mild calcification of the aortic valve leaflets.   6. Mild aortic regurgitation.   7. Mild mitral regurgitation.   8. Trace pulmonic regurgitation.   9. The inferior vena cava is normal in size measuring 1.33 cm in diameter, (normal <2.1cm) with normal inspiratory collapse (normal >50%) consistent with normal right atrial pressure (~3, range 0-5mmHg).  10. Small pericardial effusion with no evidence of hemodynamic compromise (or echocardiographic evidence of cardiac tamponade).  < end of copied text >    4/9/24 Pt presents today for elective LHC with Dr Anand. Feeling well, no c/o offered. Hgb on am labs 10.2 (baseline 12.5-13), admits to black stools over past few weeks. Denies dizziness, CP, SOB, palpitations, N/V, fever/chills, abd pain, numbness/tingling/weakness, other c/o at this time.

## 2024-04-09 ENCOUNTER — OUTPATIENT (OUTPATIENT)
Dept: OUTPATIENT SERVICES | Facility: HOSPITAL | Age: 74
LOS: 1 days | End: 2024-04-09
Payer: COMMERCIAL

## 2024-04-09 ENCOUNTER — TRANSCRIPTION ENCOUNTER (OUTPATIENT)
Age: 74
End: 2024-04-09

## 2024-04-09 VITALS
HEART RATE: 87 BPM | WEIGHT: 179.9 LBS | DIASTOLIC BLOOD PRESSURE: 82 MMHG | OXYGEN SATURATION: 100 % | HEIGHT: 64 IN | SYSTOLIC BLOOD PRESSURE: 200 MMHG | RESPIRATION RATE: 15 BRPM | TEMPERATURE: 98 F

## 2024-04-09 VITALS
DIASTOLIC BLOOD PRESSURE: 73 MMHG | OXYGEN SATURATION: 99 % | RESPIRATION RATE: 12 BRPM | SYSTOLIC BLOOD PRESSURE: 148 MMHG | HEART RATE: 68 BPM

## 2024-04-09 DIAGNOSIS — I50.9 HEART FAILURE, UNSPECIFIED: ICD-10-CM

## 2024-04-09 DIAGNOSIS — I25.10 ATHEROSCLEROTIC HEART DISEASE OF NATIVE CORONARY ARTERY WITHOUT ANGINA PECTORIS: Chronic | ICD-10-CM

## 2024-04-09 LAB
ANION GAP SERPL CALC-SCNC: 8 MMOL/L — SIGNIFICANT CHANGE UP (ref 5–17)
BUN SERPL-MCNC: 40 MG/DL — HIGH (ref 7–23)
CALCIUM SERPL-MCNC: 8.6 MG/DL — SIGNIFICANT CHANGE UP (ref 8.5–10.1)
CHLORIDE SERPL-SCNC: 108 MMOL/L — SIGNIFICANT CHANGE UP (ref 96–108)
CO2 SERPL-SCNC: 26 MMOL/L — SIGNIFICANT CHANGE UP (ref 22–31)
CREAT SERPL-MCNC: 2.5 MG/DL — HIGH (ref 0.5–1.3)
EGFR: 27 ML/MIN/1.73M2 — LOW
GLUCOSE SERPL-MCNC: 120 MG/DL — HIGH (ref 70–99)
HCT VFR BLD CALC: 33 % — LOW (ref 39–50)
HGB BLD-MCNC: 10.2 G/DL — LOW (ref 13–17)
MCHC RBC-ENTMCNC: 23.9 PG — LOW (ref 27–34)
MCHC RBC-ENTMCNC: 30.9 GM/DL — LOW (ref 32–36)
MCV RBC AUTO: 77.3 FL — LOW (ref 80–100)
NRBC # BLD: 0 /100 WBCS — SIGNIFICANT CHANGE UP (ref 0–0)
PLATELET # BLD AUTO: 280 K/UL — SIGNIFICANT CHANGE UP (ref 150–400)
POTASSIUM SERPL-MCNC: 3.6 MMOL/L — SIGNIFICANT CHANGE UP (ref 3.5–5.3)
POTASSIUM SERPL-SCNC: 3.6 MMOL/L — SIGNIFICANT CHANGE UP (ref 3.5–5.3)
RBC # BLD: 4.27 M/UL — SIGNIFICANT CHANGE UP (ref 4.2–5.8)
RBC # FLD: 17.3 % — HIGH (ref 10.3–14.5)
SODIUM SERPL-SCNC: 142 MMOL/L — SIGNIFICANT CHANGE UP (ref 135–145)
WBC # BLD: 6.88 K/UL — SIGNIFICANT CHANGE UP (ref 3.8–10.5)
WBC # FLD AUTO: 6.88 K/UL — SIGNIFICANT CHANGE UP (ref 3.8–10.5)

## 2024-04-09 PROCEDURE — 80048 BASIC METABOLIC PNL TOTAL CA: CPT

## 2024-04-09 PROCEDURE — 93005 ELECTROCARDIOGRAM TRACING: CPT

## 2024-04-09 PROCEDURE — 99223 1ST HOSP IP/OBS HIGH 75: CPT

## 2024-04-09 PROCEDURE — 93460 R&L HRT ART/VENTRICLE ANGIO: CPT

## 2024-04-09 PROCEDURE — C1769: CPT

## 2024-04-09 PROCEDURE — 36415 COLL VENOUS BLD VENIPUNCTURE: CPT

## 2024-04-09 PROCEDURE — 93460 R&L HRT ART/VENTRICLE ANGIO: CPT | Mod: 26

## 2024-04-09 PROCEDURE — C1894: CPT

## 2024-04-09 PROCEDURE — 85027 COMPLETE CBC AUTOMATED: CPT

## 2024-04-09 PROCEDURE — 99152 MOD SED SAME PHYS/QHP 5/>YRS: CPT

## 2024-04-09 PROCEDURE — C1887: CPT

## 2024-04-09 PROCEDURE — 93010 ELECTROCARDIOGRAM REPORT: CPT

## 2024-04-09 RX ORDER — RIVAROXABAN 15 MG-20MG
1 KIT ORAL
Refills: 0 | DISCHARGE

## 2024-04-09 RX ORDER — SODIUM CHLORIDE 9 MG/ML
250 INJECTION INTRAMUSCULAR; INTRAVENOUS; SUBCUTANEOUS ONCE
Refills: 0 | Status: COMPLETED | OUTPATIENT
Start: 2024-04-09 | End: 2024-04-09

## 2024-04-09 RX ORDER — CLOPIDOGREL BISULFATE 75 MG/1
1 TABLET, FILM COATED ORAL
Qty: 0 | Refills: 0 | DISCHARGE

## 2024-04-09 RX ORDER — SODIUM CHLORIDE 9 MG/ML
1000 INJECTION INTRAMUSCULAR; INTRAVENOUS; SUBCUTANEOUS
Refills: 0 | Status: DISCONTINUED | OUTPATIENT
Start: 2024-04-09 | End: 2024-04-23

## 2024-04-09 RX ORDER — ASPIRIN/CALCIUM CARB/MAGNESIUM 324 MG
1 TABLET ORAL
Qty: 0 | Refills: 0 | DISCHARGE

## 2024-04-09 RX ORDER — SODIUM CHLORIDE 9 MG/ML
500 INJECTION INTRAMUSCULAR; INTRAVENOUS; SUBCUTANEOUS
Refills: 0 | Status: COMPLETED | OUTPATIENT
Start: 2024-04-09 | End: 2024-04-09

## 2024-04-09 RX ADMIN — SODIUM CHLORIDE 75 MILLILITER(S): 9 INJECTION INTRAMUSCULAR; INTRAVENOUS; SUBCUTANEOUS at 11:30

## 2024-04-09 RX ADMIN — SODIUM CHLORIDE 500 MILLILITER(S): 9 INJECTION INTRAMUSCULAR; INTRAVENOUS; SUBCUTANEOUS at 08:07

## 2024-04-09 NOTE — DISCHARGE NOTE PROVIDER - NSDCFUSCHEDAPPT_GEN_ALL_CORE_FT
Ezekiel Gillespie  [facility]  CARDIOLOGY [address] [facility]  Scheduled Appointment: 04/12/2024

## 2024-04-09 NOTE — CONSULT NOTE ADULT - SUBJECTIVE AND OBJECTIVE BOX
[facility]  Gene Asencio PA-C  [address] [place] [id number] [phone]      Chief Complaint:  Patient is a 74y old  Male who presents with a chief complaint of ABLA (09 Apr 2024 13:48)      HPI: 74M with history as listed who presents with progressive JORDAN  noted to have cath which was negative today  asked to eval for persistently declining hgb  denies blood in stool  NEVER had EGD or colonoscopy    Allergies:  No Known Allergies      Medications:  sodium chloride 0.9%. 1000 milliLiter(s) IV Continuous <Continuous>      PMHX/PSHX:  Essential hypertension    Gastroesophageal reflux disease, esophagitis presence not specified    HTN (hypertension)    Hyperlipidemia    CAD (coronary artery disease)    No significant past surgical history    No significant past surgical history    CAD S/P percutaneous coronary angioplasty        Family history:  Family history of heart disease    No pertinent family history in first degree relatives    FH: heart disease        Social History:     ROS:     General:  no fevers, chills, night sweats, fatigue,   Eyes:  Good vision, no reported pain  ENT:  No sore throat, pain, runny nose, dysphagia  CV:  No pain, palpitations, hypo/hypertension  Resp:  No dyspnea, cough, tachypnea, wheezing  GI:  No pain, No nausea, No vomiting, No diarrhea, No constipation, No weight loss, No fever, No pruritis, No rectal bleeding, No tarry stools, No dysphagia,  :  No pain, bleeding, incontinence, nocturia  Muscle:  No pain, weakness  Neuro:  No weakness, tingling, memory problems  Psych:  No fatigue, insomnia, mood problems, depression  Endocrine:  No polyuria, polydipsia, cold/heat intolerance  Heme:  No petechiae, ecchymosis, easy bruisability  Skin:  No rash, tattoos, scars, edema      PHYSICAL EXAM:   Vital Signs:  Vital Signs Last 24 Hrs  T(C): --  T(F): --  HR: --  BP: --  BP(mean): --  RR: --  SpO2: --      Daily     Daily     GENERAL:  Appears stated age,   HEENT:  NC/AT,    CHEST:  Full & symmetric excursion,   HEART:  Regular rhythm  ABDOMEN:  Soft, non-tender, non-distended,   EXTEREMITIES:  no cyanosis,clubbing or edema  SKIN:  No rash  NEURO:  Alert,    LABS:                        10.2   6.88  )-----------( 280      ( 09 Apr 2024 07:20 )             33.0     04-09    142  |  108  |  40<H>  ----------------------------<  120<H>  3.6   |  26  |  2.50<H>    Ca    8.6      09 Apr 2024 07:20          Urinalysis Basic - ( 09 Apr 2024 07:20 )    Color: x / Appearance: x / SG: x / pH: x  Gluc: 120 mg/dL / Ketone: x  / Bili: x / Urobili: x   Blood: x / Protein: x / Nitrite: x   Leuk Esterase: x / RBC: x / WBC x   Sq Epi: x / Non Sq Epi: x / Bacteria: x          Imaging:

## 2024-04-09 NOTE — CONSULT NOTE ADULT - SUBJECTIVE AND OBJECTIVE BOX
[facility] - Roxi Pardo, Jose Miguel, Rommel, Caroline, Cristi Enriquez  Office Number: [phone]    Initial Consult Note    CHIEF COMPLAINT: Patient is a 74y old  Male who presents with a chief complaint of sent for cath     HPI:  74 year old male with PMH CAD s/p PCI/ANAHI x 2 to LAD/Ramus , MI , Htn, HLD, PAfib , CKD    admitted  with PNA, again march for pulmonary edema with afib with rvr LEOBARDO now presenting for elective cardiac catherizaton.   Seen at bedside with wife present. Overall he feels weakness denies chest pain dizziness palpitations or dyspnea. No recent fever chills cough  . + black stools for weeks he has noticed   Follows in office with Dr Gillespie      PAST MEDICAL & SURGICAL HISTORY:  Essential hypertension      Gastroesophageal reflux disease, esophagitis presence not specified      HTN (hypertension)      Hyperlipidemia      CAD (coronary artery disease)  stents x 2      CAD S/P percutaneous coronary angioplasty  with ANAHI          SOCIAL HISTORY: former quit 40years ago    denies ethanol, or drug abuse.    FAMILY HISTORY:  FH: heart disease  father and mother       No family history of acute MI or sudden cardiac death.    MEDICATIONS  (STANDING):  sodium chloride 0.9%. 1000 milliLiter(s) (75 mL/Hr) IV Continuous <Continuous>    MEDICATIONS  (PRN):      Allergies    No Known Allergies    Intolerances        REVIEW OF SYSTEMS:  All other review of systems is negative unless indicated above    VITAL SIGNS:   Vital Signs Last 24 Hrs  T(C): 36.6 (2024 09:20), Max: 36.8 (2024 07:35)  T(F): 97.8 (2024 09:20), Max: 98.2 (2024 07:35)  HR: 66 (2024 11:15) (61 - 87)  BP: 112/55 (2024 11:15) (102/76 - 200/82)  BP(mean): --  RR: 16 (2024 11:15) (11 - 18)  SpO2: 99% (2024 11:15) (97% - 100%)    Parameters below as of 2024 09:20  Patient On (Oxygen Delivery Method): room air        I&O's Summary      On Exam:    Constitutional: NAD, alert and oriented x 3  Lungs:  Non-labored, breath sounds are clear bilaterally, No wheezing, rales or rhonchi  Cardiovascular: IRRR.  S1 and S2 positive.  No murmurs, rubs, gallops or clicks  Gastrointestinal: Bowel Sounds present, soft, nontender.   Lymph: No peripheral edema. No cervical lymphadenopathy.  Neurological: Alert, no focal deficits  Skin: No rashes or ulcers , right wrist benign dressing cdi   Psych:  Mood & affect appropriate.    LABS: All Labs Reviewed:                        10.2   6.88  )-----------( 280      ( 2024 07:20 )             33.0     2024 07:20    142    |  108    |  40     ----------------------------<  120    3.6     |  26     |  2.50     Ca    8.6        2024 07:20            Blood Culture:         RADIOLOGY:    EKG:    TRANSTHORACIC ECHOCARDIOGRAM REPORT  ________________________________________________________________________________                                      _______       Pt. Name:       TRUDI COOLEY Study Date:    3/15/2024  MRN:            [id number]      YOB: 1950  Accession #:    [id number]     Age:           74 years  Account#:       [id number]    Gender:        M  Heart Rate:                   Height:        64.17 in (163.00 cm)  Rhythm:                       Weight:        191.80 lb (87.00 kg)  Blood Pressure: 120/76 mmHg   BSA/BMI:       1.93 m² / 32.74 kg/m²  ________________________________________________________________________________________  Referring Physician:    Danilo Mooney MD  Interpreting Physician: Marva Enriquez MD  Primary Sonographer:    Yessica Rdz [facility]    CPT:               ECHO TTE WO CON COMP W DOPP - 06577.m  Indication(s):     Heart failure, unspecified - I50.9  Procedure:         Transthoracic echocardiogram with 2-D, M-mode and complete                    spectral and color flow Doppler.  Ordering Location: [facility]  Admission Status:  Inpatient    _______________________________________________________________________________________     CONCLUSIONS:      1. Left ventricular systolic function is normal with an ejection fraction of 53 % by Burns's method of disks.   2. Mild left ventricular hypertrophy.   3. Normal right ventricular cavity size, with normal wall thickness, and normal systolic function.   4. The left atrium is mildly dilated.   5. Trileaflet aortic valve with normal systolic excursion. There is mild calcification of the aortic valve leaflets.   6. Mild aortic regurgitation.   7. Mild mitral regurgitation.   8. Trace pulmonic regurgitation.   9. The inferior vena cava is normal in size measuring 1.33 cm in diameter, (normal <2.1cm) with normal inspiratory collapse (normal >50%) consistent with normal right atrial pressure (~3, range 0-5mmHg).  10. Small pericardial effusion with no evidence of hemodynamic compromise (or echocardiographic evidence of cardiac tamponade).    ________________________________________________________________________________________  FINDINGS:     Left Ventricle:  Left ventricular systolic function is normal with a calculated ejection fraction of 53 % by the Bruns's biplane method of disks. Mild left ventricular hypertrophy.     Right Ventricle:  The right ventricular cavity is normal in size, with normal wall thickness and normal systolic function.     Left Atrium:  The left atrium is mildly dilated with an indexed volume of 41.66 ml/m².     Right Atrium:  The right atrium is normal in size with an indexed volume of 28.62 ml/m².     Aortic Valve:  The aortic valve appears trileaflet with normal systolic excursion. There is mild calcification of the aortic valve leaflets. There is mild aortic regurgitation.     Mitral Valve:  Structurally normal mitral valve with normal leaflet excursion. There is no mitral valve stenosis. There is mild mitral regurgitation.     Tricuspid Valve:  Structurally normal tricuspid valve with normal leaflet excursion.     Pulmonic Valve:  Structurally normal pulmonic valve with normal leaflet excursion. There is trace pulmonic regurgitation.     Aorta:  The aortic root at the sinuses of Valsalva is normal in size, measuring 2.90 cm (indexed 1.51 cm/m²). The aortic arch diameter is normal in size, measuring 3.2 cm (indexed 1.66 cm/m²).     Pericardium:  There is a small pericardial effusion with no evidence of hemodynamic compromise (or echocardiographic evidence of cardiac tamponade).     Systemic Veins:  The inferior vena cava is normal in size measuring 1.33 cm in diameter, (normal <2.1cm) with normal inspiratory collapse (normal >50%) consistent with normal right atrial pressure (~3, range 0-5mmHg).  ____________________________________________________________________  QUANTITATIVE DATA:  Left Ventricle Measurements: (Indexed to BSA)     IVSd (2D):   1.3 cm  LVPWd (2D):  1.3 cm  LVIDd (2D):  4.8 cm  LVIDs (2D):  3.5 cm  LV Mass:     244 g  126.7 g/m²  LV Vol d, MOD A2C: 107.0 ml 55.58 ml/m²  LV Vol d, MOD A4C: 128.0 ml 66.48 ml/m²  LV Vol d, MOD BP:  122.3 ml 63.50 ml/m²  LV Vol s, MOD A2C: 56.0 ml  29.09 ml/m²  LV Vol s, MOD A4C: 54.8 ml  28.46 ml/m²  LV Vol s, MOD BP:  57.5 ml  29.88 ml/m²  LVOT SV MOD BP:    64.7 ml  LV EF% MOD BP:     53 %     MV E Vmax:    0.92 m/s  e' lateral:   12.10 cm/s  e' medial:    8.27 cm/s  E/e' lateral: 7.58  E/e' medial:  11.09  E/e' Average: 9.00  MV DT:        197 msec    Aorta Measurements: (Normal range) (Indexed to BSA)     Sinuses of Valsalva: 2.90 cm (3.1 - 3.7 cm)  Ao Arch:             3.2 cm       Left Atrium Measurements: (Indexed to BSA)  LA Diam 2D: 3.90 cm    Right Ventricle Measurements: Right Atrial Measurements:     RV Base (RVID1):  3.9 cm      RA Vol:       55.10 ml  RV Mid (RVID2):   3.1 cm      RA Vol Index: 28.62 ml/m²  RV Major (RVID3): 8.7 cm    Aortic Valve Measurements:  AR Vmax  4.92 m/s  AR PHT   531 msec  AR [place] 2.71 m/s²    Mitral Valve Measurements:     MV E Vmax: 0.9 m/s         MR Vmax:          4.39 m/s                             MR Peak Gradient: 77.1 mmHg       Tricuspid Valve Measurements:     RA Pressure: 3 mmHg    ________________________________________________________________________________________

## 2024-04-09 NOTE — DISCHARGE NOTE PROVIDER - NSDCFUADDINST_GEN_ALL_CORE_FT
Wound Care:   the day AFTER your procedure...     Remove the bandage from the site and gently clean with soap and water then pat dry; leave open to air.     You may take a brief shower     Do NOT apply lotions, creams, powders, ointments, or perfumes to your incision site unless prescribed by your physician     Do NOT soak your procedure site for 1 week (no baths, no pools, no tubs, etc...)     Check  your groin and /or wrist daily. A small amount of bruising, and soreness are normal    ACTIVITY: for 24 hours      - DO NOT DRIVE     - DO NOT make any important decisions or sign legal documents      - DO NOT operate heavy machinery      - you may resume sexual activity in 48 hours, unless otherwise instructed by your cardiologist          If your procedure was done through the WRIST: for the NEXT 3 DAYS:          - avoid pushing, pulling, with that affected wrist (such as pushing up from a seated position)          - avoid repeated movement of that hand and wrist (such as typing or hammering)          - DO NOT LIFT anything more than 5 pounds         If your procedure was done through the GROIN: for the NEXT 5 DAYS          - Limit climbing stairs, DO NOT soak in bathtub or pool          - no strenuous activities, pushing, pulling, straining          - Do not lift anything more than 10 pounds     MEDICATION:      Please take your medications as explained to you (found on your discharge paperwork)      DO NOT STOP taking them without consulting with your cardiologist first.      **if you have diabetes and take metformin please do not take this medication for 2 days after the procedure. Restart and take as usual starting day 3.    Follow the heart healthy diet recommended by your doctor.   Drink plenty of water for the next 24 hours unless otherwise instructed.  Do not drink any alcoholic beverages for 24 hours (beer, wine, liquor, etc).    If you smoke: STOP SMOKING. Call the Center for Tobacco Control at [phone] for assistance.    CALL your cardiologist/primary care doctor to make a follow-up appointment in 2 WEEKS     **CALL YOUR DOCTOR if you experience     fever, chills, body aches, or severe pain, swelling, redness, heat or yellow discharge at incision site     bleeding or excruciating pain at the procedural site, swelling (golf ball size) at your procedural site     CHEST PAIN     numbness, tingling, temperature change (of your procedural site)     Pain  -you may have pain after your surgery or procedure at the puncture site or in the artery/vein that has been treated.  -take pain medication as directed by your doctor.  call your doctor if your pain is not getting better within 5 days or if it gets worse  -prescription pain medication should be taken with food, and can cause constipation, an over-the-counter softener may be helpful    Nausea  -anesthesia/sedation can upset your stomach  -eat bland foods (Jell-o, crackers, toast) and drink ginger ale if you are nauseated  -drink plenty of fluids such as water or ginger ale (unless instructed otherwise by your doctor)  -if you have nausea or vomiting the day after your procedure, call your doctor    Bleeding  -you may have a small amount of oozing from your surgical or procedural site  -bleeding as the site can be dangerous and should prompt immediate medical attention    Infection  -if you have any of the following signs of infection, call your doctor:       redness, swelling, fever over 101 degrees, thick yellow/white drainage    If you are unable to reach your doctor, you may contact:   Dr Latia Anand @ [phone]    **Call 911 immediately if:     - your hand or leg becomes blue, feels cold to touch, or if you have numbness or tingling     - bleeding or swelling from your wrist or groin site cannot be controlled or if area becomes very red or hot to touch     - you have pain, pressure, tightness or burning in your chest, arms, jaw or stomach; shortness of breath; nausea or excessive sweating; lightheadedness; dizziness or a fainting spell; or if you have sudden back or stomach pain     -you have rapid heartbeat or palpitations     - you have bright red blood in large amounts, severe pain at access site (wrist or groin) or significant new swelling at the puncture site

## 2024-04-09 NOTE — DISCHARGE NOTE PROVIDER - PROVIDER TOKENS
PROVIDER:[TOKEN:[[id number]]],PROVIDER:[TOKEN:[[id number]]],PROVIDER:[TOKEN:[[id number]]],PROVIDER:[TOKEN:[[id number]]]

## 2024-04-09 NOTE — CONSULT NOTE ADULT - NS ATTEND AMEND GEN_ALL_CORE FT
74 year old male with PMH CAD (s/p PCI to the proximal LAD and RI in January of 2016), HTN, HFpEF, atrial fibrillation (on xarelto), HLD, CKD  follows with Dr paul recent abnormal stress , admitted 12/23 with PNA, again march for pulmonary edema with afib with rvr LEOBARDO now presenting for cardiac catherizaton.        Known CAD s/p PCI/ANAHI x 2 to LAD/Ramus 2016, MI 2016, Htn, HLD, PAfib   Sp diagnostic LHC 4/9/24  : LAD stent, Diag 100%, dLCx 100%, dRCA 90%.  RHC: CO/CI: 6.53/3.49; PA (S/D/M) 37/14/24; LVEDP 11   to treat rca disease medically for now given anemia black stool for outpt viability study   Check 12 lead EKG  Continue home asa statin and beta blocker   Echo as above 3/15/2024 ef 53% full report as above   check full lipid     has known afib   on coreg and cardizem as outpt  on home xarelto can hold for egd/ colon     LEOBARDO on CKD, seen by renal    hold home Lasix  on IVF        spoke with pt at length as not sure about egd/colon. explained that it would be necessary at this time to be saturnino to resume AC and hopefully prevent more ADHF admissions.        Addendum:  despite risk pt has decided to sign out AMA

## 2024-04-09 NOTE — CONSULT NOTE ADULT - ASSESSMENT
anemia    admit to medicine  hold a/c  planned for EGD/COLON as inpatient on thursday PM  reg diet today  clears tommorow  prep to be ordered
  74 year old male with PMH CAD (s/p PCI to the proximal LAD and RI in January of 2016), HTN, HFpEF, atrial fibrillation (on xarelto), HLD, CKD  follows with Dr paul recent abnormal stress , admitted 12/23 with PNA, again march for pulmonary edema with afib with rvr LEOBARDO now presenting for cardiac catherizaton.        Known CAD s/p PCI/ANAHI x 2 to LAD/Ramus 2016, MI 2016, Htn, HLD, PAfib   Sp diagnostic LHC 4/9/24  : LAD stent, Diag 100%, dLCx 100%, dRCA 90%.  RHC: CO/CI: 6.53/3.49; PA (S/D/M) 37/14/24; LVEDP 11   to treat rca disease medically for now given anemia black stool for outpt viability study   Check 12 lead EKG  Continue home asa statin and beta blocker   Echo as above 3/15/2024 ef 53% full report as above   check full lipid     has known afib   on coreg and cardizem as outpt  on home xarelto can hold for egd/ colon     LEOBARDO on CKD, seen by renal    hold home Lasix  on IVF     seen by GI plan for colonoscopy this week 4/11/24   can hold xarelto for procedure  patient is unsure if he wants to proceed  no cardiac objection to proceeding with low risk procedure     bp stable    to fu Dr Paul on dc  Monitor and replete electrolytes. Keep K>4.0 and Mg>2.0.  Marquita Echeverria FNP-C  Cardiology NP  [id number] [phone]       
CKD 3, CRS  CAD, s/p Cardiac cath  Hypertension    Monitor creatinine trend post cardiac cath. Continue IV hydration. D/w patient regarding need for out patient nephrology follow up.   Cardiology follow up. D/w patients family at bedside.     Further recommendations pending clinical course. Thank you for the courtesy of this referral.

## 2024-04-09 NOTE — DISCHARGE NOTE PROVIDER - NSDCMRMEDTOKEN_GEN_ALL_CORE_FT
aspirin 81 mg oral tablet: 1 tab(s) orally once a day  atorvastatin 40 mg oral tablet: 1 tab(s) orally once a day (at bedtime)  carvedilol 25 mg oral tablet: 1 tab(s) orally 2 times a day  dilTIAZem 120 mg/24 hours oral capsule, extended release: 1 cap(s) orally once a day  folic acid 1 mg oral tablet: 1 tab(s) orally once a day  furosemide 20 mg oral tablet: 1 tab(s) orally once a day  Protonix 40 mg oral delayed release tablet: 1 tab(s) orally once a day  Xarelto 20 mg oral tablet: 1 tab(s) orally once a day   atorvastatin 40 mg oral tablet: 1 tab(s) orally once a day (at bedtime)  carvedilol 25 mg oral tablet: 1 tab(s) orally 2 times a day  dilTIAZem 120 mg/24 hours oral capsule, extended release: 1 cap(s) orally once a day  folic acid 1 mg oral tablet: 1 tab(s) orally once a day  Protonix 40 mg oral delayed release tablet: 1 tab(s) orally once a day

## 2024-04-09 NOTE — ASU PATIENT PROFILE, ADULT - FALL HARM RISK - CONCLUSION
Problem: Physical Therapy Goal  Goal: Physical Therapy Goal  Description: Goals to be met by: 2020    Patient will increase functional independence with mobility by performin. Bed to chair transfer with [place] using no AD    Outcome: Met      Universal Safety Interventions

## 2024-04-09 NOTE — CHART NOTE - NSCHARTNOTEFT_GEN_A_CORE
Post Diagnostic Cardiac Catheterization Chart Note      Prelim cath report:   LHC via RRA  RHC via Right brachial vein  Patent LAD stent, Diag 100%, dLCx 100%, dRCA 90%.  RHC: CO/CI: 6.53/3.49; PA (S/D/M) 37/14/24; LVEDP 11   full/official report to follow      Patient without complaints. Denies CP, SOB, palpitations, N/V, fever/chills, abd pain, numbness/tingling/weakness, other c/o at this time.    A+O x 3, neurologically intact  RRA access site stable (clean, dry, intact, without bleeding, heat, erythema, or hematoma). Radial band in place.  Right brachial venous access site stable, dressing C/D/I.  No active bleeding and/or hematoma.  RUE motor, neuro, circ intact.  Hemodynamically stable, neurologically intact, VS stable, afebrile    A/P: s/p diagnostic LHC  - Defer PCI at this time given acute blood loss anemia and suspected acute on CKD seen on pre-procedure labs.  Continue medical management per clinical cardiology team  - Admit to medicine/remote telemetry for further anemia work up including need for GI consult as discussed with Dr. Real.   - Defer AC to medicine/clinical cardiology team.  My resume Xarelto tomorrow from Interventional Cardiology standpoint if no contraindications. If plan is to cover with heparin gtt, may start after 15:00 on 04/09.    - post cath access site precautions reviewed with pt who verbalized good understanding  - May transfer to medicine bed once post cath recovery completed / discharge criteria met and wrist / hemodynamics remain stable (with radial band removed).   - see discharge for instructions/plan  - remainder of plan per primary team/non-interventional cardiology.
Was asked by cardiac cath team to admit patient for possible GIB.  patient and wife adamantly refusing inpatient admission for further work up.  signed AMA form ( witnessed by RN emilie at bedside )  explained risk of worsening GIB to patient and wife.  notified cardio Dr. haley, GI Dr. Seaman and PMD Dr. Lazo.

## 2024-04-09 NOTE — DISCHARGE NOTE PROVIDER - NSDCCPCAREPLAN_GEN_ALL_CORE_FT
PRINCIPAL DISCHARGE DIAGNOSIS  Diagnosis: CAD (coronary artery disease)  Assessment and Plan of Treatment:       SECONDARY DISCHARGE DIAGNOSES  Diagnosis: Acute blood loss anemia  Assessment and Plan of Treatment:

## 2024-04-09 NOTE — DISCHARGE NOTE PROVIDER - CARE PROVIDER_API CALL
MAGGI SCHNEIDER, JOHNY BECKETT  Phone: [phone]  Fax: [phone]  Follow Up Time:     Pako Wilson  Gastroenterology  [address] [place] [address]  Phone: [phone]  Fax: [phone]  Follow Up Time:     Goran Pink  Nephrology  [address] [facility], [address]  [place] [address]  Phone: [phone]  Fax: [phone]  Follow Up Time:     Ezekiel Gillespie  Cardiology  [address] [place] [address]  Phone: [phone]  Fax: [phone]  Follow Up Time:

## 2024-04-09 NOTE — DISCHARGE NOTE PROVIDER - NSDCFUADDAPPT_GEN_ALL_CORE_FT
follow up with your primary care doctor for further care and instructions    Hold xarelto and lasix until you follow-up/call Dr Gillespie. follow up with your primary care doctor for further care and instructions    Hold xarelto  and lasix until you follow-up/call Dr Gillespie.

## 2024-04-09 NOTE — DISCHARGE NOTE PROVIDER - CARE PROVIDERS DIRECT ADDRESSES
[email or web address],[email or web address]

## 2024-04-09 NOTE — DISCHARGE NOTE PROVIDER - HOSPITAL COURSE
74 year old male with PMH CAD (s/p PCI to the proximal LAD and RI in January of 2016), HTN, HFpEF, atrial fibrillation (on xarelto), HLD, CKD (recent creat 3/2024 2-2.3).    3/20/24 f/u cardiology appt with Dr MANASA Gillespie:   nuclear stress testing in January of 2019 and was found to have a 9 METS exercise capacity with no symptoms. He had a large area of inferior and inferolateral infarct, along with a medium sized area of mild mid to distal anterior wall ischemia. We discussed the options, and given that his defect was mild, he had a robust exercise tolerance, and no symptoms, we elected to manage this medically. He has not had any further chest pains since. He has also been diagnosed with PAF.     He presented to [facility] ED on 12/21/23, BIBEMS with SOB, JORDAN and congestion which has worsened in last day. C-xray was done and showed b/l infiltrates, thought to be PNA. CT chest also done, showed B/L infiltrates vs edema. He was started on antibiotics and given 500cc IVF. Overnight a rapid response was called for increasing SOB, hypoxia (SpO2 71% on 100% nrb) and hypertensive urgency. He was started on BiPap, given IV Lasix 80mg x1 and started on a Nitro gtt. A bedside POCUS was done and showed +B-lines but no evidence of right heart strain. He was subsequently transferred to the ICU for suspected flash pulmonary edema.     IN March he was again admitted with pulmonary edema and AF with RVR. He developed a mild LEOBARDO. He improved quickly, and was discharged with outpatient follow up. He was started on diltiazem, and hydralazine was stopped.     He arrives today feeling well. He denies chest pains or pressure. He denies dizzy spells, feeling faint or fainting, He denies palpitations or difficulty breathing while laying flat. He denies lower extremity swelling. He saw his PMD. Repeat echo in our office showed normal LV function, severe LAE, and moderate MR.       < from: TTE W or WO Ultrasound Enhancing Agent (03.15.24 @ 10:20) >  CONCLUSIONS:   1. Left ventricular systolic function is normal with an ejection fraction of 53 % by Burns's method of disks.   2. Mild left ventricular hypertrophy.   3. Normal right ventricular cavity size, with normal wall thickness, and normal systolic function.   4. The left atrium is mildly dilated.   5. Trileaflet aortic valve with normal systolic excursion. There is mild calcification of the aortic valve leaflets.   6. Mild aortic regurgitation.   7. Mild mitral regurgitation.   8. Trace pulmonic regurgitation.   9. The inferior vena cava is normal in size measuring 1.33 cm in diameter, (normal <2.1cm) with normal inspiratory collapse (normal >50%) consistent with normal right atrial pressure (~3, range 0-5mmHg).  10. Small pericardial effusion with no evidence of hemodynamic compromise (or echocardiographic evidence of cardiac tamponade).  < end of copied text >    4/9/24 Pt presents today for elective LHC with Dr Anand. Feeling well, no c/o offered. Hgb on am labs 10.2 (baseline 12.5-13), admits to black stools over past few weeks. Denies dizziness, CP, SOB, palpitations, N/V, fever/chills, abd pain, numbness/tingling/weakness, other c/o at this time.     < from: Cardiac Catheterization (04.09.24 @ 08:42) >  Cath Lab Report    Diagnostic Cardiologist:       Latia Anand MD   Referring Physician:           Ezekiel Gillespie MD   Procedures Performed   1.    Venous Access - Right Brachial   2.    RHC   3.    Arterial Access - Right Radial   4.    Left Heart Cath   5.    Diagnostic Coronary Angiography   Indications:               Congestive heart failure, acute on chronic diastolic  Diagnostic Conclusions:      Patent LAD and OM stents.      Occluded ()- D1 and dLCx.      Severe RCA disease - worse than prior.      Normal PCWP with V wave upto 28mmHg   Recommendations:      Admit for anemia and LEOBARDO w/u.      Would Rx RCA disease medically for now and consider outpt viability assessment  Acute complication:    No complications   < end of copied text >    non-interventional cardiology recommendations include holding xarelto pending egd/colonoscopy and lasix pending reevaluation  Pt being discharged against medical advice. R/B/A/L discussed with patient by Dr Real.

## 2024-04-09 NOTE — CONSULT NOTE ADULT - SUBJECTIVE AND OBJECTIVE BOX
Patient is a 74y old  Male who presents with a chief complaint of   HPI:  74 year old male with PMH CAD (s/p PCI to the proximal LAD and RI in 2016), HTN, HFpEF, atrial fibrillation (on xarelto), HLD, CKD (recent creat 3/2024 2-2.3).    3/20/24 f/u cardiology appt with Dr MANASA Gillespie:   nuclear stress testing in 2019 and was found to have a 9 METS exercise capacity with no symptoms. He had a large area of inferior and inferolateral infarct, along with a medium sized area of mild mid to distal anterior wall ischemia. We discussed the options, and given that his defect was mild, he had a robust exercise tolerance, and no symptoms, we elected to manage this medically. He has not had any further chest pains since. He has also been diagnosed with PAF.     He presented to [facility] ED on 23, BIBEMS with SOB, JORDAN and congestion which has worsened in last day. C-xray was done and showed b/l infiltrates, thought to be PNA. CT chest also done, showed B/L infiltrates vs edema. He was started on antibiotics and given 500cc IVF. Overnight a rapid response was called for increasing SOB, hypoxia (SpO2 71% on 100% nrb) and hypertensive urgency. He was started on BiPap, given IV Lasix 80mg x1 and started on a Nitro gtt. A bedside POCUS was done and showed +B-lines but no evidence of right heart strain. He was subsequently transferred to the ICU for suspected flash pulmonary edema.     IN March he was again admitted with pulmonary edema and AF with RVR. He developed a mild LEOBARDO. He improved quickly, and was discharged with outpatient follow up. He was started on diltiazem, and hydralazine was stopped.     He arrives today feeling well. He denies chest pains or pressure. He denies dizzy spells, feeling faint or fainting, He denies palpitations or difficulty breathing while laying flat. He denies lower extremity swelling. He saw his PMD. Repeat echo in our office showed normal LV function, severe LAE, and moderate MR.       < from: TTE W or WO Ultrasound Enhancing Agent (03.15.24 @ 10:20) >  CONCLUSIONS:   1. Left ventricular systolic function is normal with an ejection fraction of 53 % by Burns's method of disks.   2. Mild left ventricular hypertrophy.   3. Normal right ventricular cavity size, with normal wall thickness, and normal systolic function.   4. The left atrium is mildly dilated.   5. Trileaflet aortic valve with normal systolic excursion. There is mild calcification of the aortic valve leaflets.   6. Mild aortic regurgitation.   7. Mild mitral regurgitation.   8. Trace pulmonic regurgitation.   9. The inferior vena cava is normal in size measuring 1.33 cm in diameter, (normal <2.1cm) with normal inspiratory collapse (normal >50%) consistent with normal right atrial pressure (~3, range 0-5mmHg).  10. Small pericardial effusion with no evidence of hemodynamic compromise (or echocardiographic evidence of cardiac tamponade).  < end of copied text >    24 Pt presents today for elective LHC with Dr Anand. Feeling well, no c/o offered. Hgb on am labs 10.2 (baseline 12.5-13), admits to black stools over past few weeks. Denies dizziness, CP, SOB, palpitations, N/V, fever/chills, abd pain, numbness/tingling/weakness, other c/o at this time.    (2024 14:30)      PAST MEDICAL HISTORY:  Essential hypertension    Gastroesophageal reflux disease, esophagitis presence not specified    HTN (hypertension)    Hyperlipidemia    CAD (coronary artery disease)        PAST SURGICAL HISTORY:  No significant past surgical history    No significant past surgical history    CAD S/P percutaneous coronary angioplasty        FAMILY HISTORY:  FH: heart disease  father and mother         SOCIAL HISTORY:    Allergies    No Known Allergies    Intolerances      Home Medications:  aspirin 81 mg oral tablet: 1 tab(s) orally once a day (2024 14:19)  atorvastatin 40 mg oral tablet: 1 tab(s) orally once a day (at bedtime) (2024 14:19)  carvedilol 25 mg oral tablet: 1 tab(s) orally 2 times a day (2024 14:19)  Xarelto 20 mg oral tablet: 1 tab(s) orally once a day (2024 14:19)    MEDICATIONS  (STANDING):  sodium chloride 0.9%. 1000 milliLiter(s) (75 mL/Hr) IV Continuous <Continuous>    MEDICATIONS  (PRN):      REVIEW OF SYSTEMS:  General:   Respiratory: No cough, SOB  Cardiovascular: No CP or Palpitations	  Gastrointestinal: No nausea, Vomiting. No diarrhea  Genitourinary: No urinary complaints	  Musculoskeletal: No leg swelling, No new rash or lesions	  Neurological: 	  all other systems negative    T(F): 97.8 (24 @ 09:20), Max: 98.2 (24 @ 07:35)  HR: 66 (24 @ 11:15) (61 - 87)  BP: 112/55 (24 @ 11:15) (102/76 - 200/82)  RR: 16 (24 @ 11:15) (11 - 18)  SpO2: 99% (24 @ 11:15) (97% - 100%)  Wt(kg): --    PHYSICAL EXAM:  General: NAD  Respiratory: b/l air entry  Cardiovascular: S1 S2  Gastrointestinal: soft  Extremities: edema            142  |  108  |  40<H>  ----------------------------<  120<H>  3.6   |  26  |  2.50<H>    Ca    8.6      2024 07:20                            10.2   6.88  )-----------( 280      ( 2024 07:20 )             33.0       Hemoglobin: 10.2 g/dL ( @ 07:20)  Hematocrit: 33.0 % ( @ 07:20)  Potassium: 3.6 mmol/L ( @ 07:20)  Blood Urea Nitrogen: 40 mg/dL ( @ 07:20)      Creatinine, Serum: 2.50 ( @ 07:20)      Urinalysis Basic - ( 2024 07:20 )    Color: x / Appearance: x / SG: x / pH: x  Gluc: 120 mg/dL / Ketone: x  / Bili: x / Urobili: x   Blood: x / Protein: x / Nitrite: x   Leuk Esterase: x / RBC: x / WBC x   Sq Epi: x / Non Sq Epi: x / Bacteria: x                    I&O's Detail         Patient is a 74y old  Male who presents with a chief complaint of CKD.     HPI:  74 year old male with PMH CAD (s/p PCI to the proximal LAD and RI in 2016), HTN, HFpEF, atrial fibrillation (on xarelto), HLD, CKD (recent creat 3/2024 2-2.3).    3/20/24 f/u cardiology appt with Dr MANASA Gillespie:   nuclear stress testing in 2019 and was found to have a 9 METS exercise capacity with no symptoms. He had a large area of inferior and inferolateral infarct, along with a medium sized area of mild mid to distal anterior wall ischemia. We discussed the options, and given that his defect was mild, he had a robust exercise tolerance, and no symptoms, we elected to manage this medically. He has not had any further chest pains since. He has also been diagnosed with PAF.     He presented to [facility] ED on 23, BIBEMS with SOB, JORDAN and congestion which has worsened in last day. C-xray was done and showed b/l infiltrates, thought to be PNA. CT chest also done, showed B/L infiltrates vs edema. He was started on antibiotics and given 500cc IVF. Overnight a rapid response was called for increasing SOB, hypoxia (SpO2 71% on 100% nrb) and hypertensive urgency. He was started on BiPap, given IV Lasix 80mg x1 and started on a Nitro gtt. A bedside POCUS was done and showed +B-lines but no evidence of right heart strain. He was subsequently transferred to the ICU for suspected flash pulmonary edema.     IN March he was again admitted with pulmonary edema and AF with RVR. He developed a mild LEOBARDO. He improved quickly, and was discharged with outpatient follow up. He was started on diltiazem, and hydralazine was stopped.     He arrives today feeling well. He denies chest pains or pressure. He denies dizzy spells, feeling faint or fainting, He denies palpitations or difficulty breathing while laying flat. He denies lower extremity swelling. He saw his PMD. Repeat echo in our office showed normal LV function, severe LAE, and moderate MR.       < from: TTE W or WO Ultrasound Enhancing Agent (03.15.24 @ 10:20) >  CONCLUSIONS:   1. Left ventricular systolic function is normal with an ejection fraction of 53 % by Burns's method of disks.   2. Mild left ventricular hypertrophy.   3. Normal right ventricular cavity size, with normal wall thickness, and normal systolic function.   4. The left atrium is mildly dilated.   5. Trileaflet aortic valve with normal systolic excursion. There is mild calcification of the aortic valve leaflets.   6. Mild aortic regurgitation.   7. Mild mitral regurgitation.   8. Trace pulmonic regurgitation.   9. The inferior vena cava is normal in size measuring 1.33 cm in diameter, (normal <2.1cm) with normal inspiratory collapse (normal >50%) consistent with normal right atrial pressure (~3, range 0-5mmHg).  10. Small pericardial effusion with no evidence of hemodynamic compromise (or echocardiographic evidence of cardiac tamponade).  < end of copied text >    24 Pt presents today for elective LHC with Dr Anand. Feeling well, no c/o offered. Hgb on am labs 10.2 (baseline 12.5-13), admits to black stools over past few weeks. Denies dizziness, CP, SOB, palpitations, N/V, fever/chills, abd pain, numbness/tingling/weakness, other c/o at this time.    (2024 14:30)    Renal consult called for LEOBARDO on CKD.       PAST MEDICAL HISTORY:  Essential hypertension    Gastroesophageal reflux disease, esophagitis presence not specified    HTN (hypertension)    Hyperlipidemia    CAD (coronary artery disease)        PAST SURGICAL HISTORY:  No significant past surgical history    No significant past surgical history    CAD S/P percutaneous coronary angioplasty        FAMILY HISTORY:  FH: heart disease  father and mother         SOCIAL HISTORY: No smoking or alcohol use     Allergies    No Known Allergies    Intolerances      Home Medications:  aspirin 81 mg oral tablet: 1 tab(s) orally once a day (2024 14:19)  atorvastatin 40 mg oral tablet: 1 tab(s) orally once a day (at bedtime) (2024 14:19)  carvedilol 25 mg oral tablet: 1 tab(s) orally 2 times a day (2024 14:19)  Xarelto 20 mg oral tablet: 1 tab(s) orally once a day (2024 14:19)    MEDICATIONS  (STANDING):  sodium chloride 0.9%. 1000 milliLiter(s) (75 mL/Hr) IV Continuous <Continuous>    MEDICATIONS  (PRN):      REVIEW OF SYSTEMS:  General: no distress  Respiratory: No cough, SOB  Cardiovascular: No CP or Palpitations	  Gastrointestinal: No nausea, Vomiting. No diarrhea  Genitourinary: No urinary complaints	  Musculoskeletal: No new rash or lesions		  all other systems negative    T(F): 97.8 (24 @ 09:20), Max: 98.2 (24 @ 07:35)  HR: 66 (24 @ 11:15) (61 - 87)  BP: 112/55 (24 @ 11:15) (102/76 - 200/82)  RR: 16 (24 @ 11:15) (11 - 18)  SpO2: 99% (24 @ 11:15) (97% - 100%)  Wt(kg): --    PHYSICAL EXAM:  General: NAD  Respiratory: b/l air entry  Cardiovascular: S1 S2  Gastrointestinal: soft  Extremities: edema            142  |  108  |  40<H>  ----------------------------<  120<H>  3.6   |  26  |  2.50<H>    Ca    8.6      2024 07:20                            10.2   6.88  )-----------( 280      ( 2024 07:20 )             33.0       Hemoglobin: 10.2 g/dL ( @ 07:20)  Hematocrit: 33.0 % ( @ 07:20)  Potassium: 3.6 mmol/L ( 07:20)  Blood Urea Nitrogen: 40 mg/dL ( @ 07:20)      Creatinine, Serum: 2.50 ( @ 07:20)      Urinalysis Basic - ( 2024 07:20 )    Color: x / Appearance: x / SG: x / pH: x  Gluc: 120 mg/dL / Ketone: x  / Bili: x / Urobili: x   Blood: x / Protein: x / Nitrite: x   Leuk Esterase: x / RBC: x / WBC x   Sq Epi: x / Non Sq Epi: x / Bacteria: x

## 2024-04-12 ENCOUNTER — APPOINTMENT (OUTPATIENT)
Dept: CARDIOLOGY | Facility: CLINIC | Age: 74
End: 2024-04-12
Payer: COMMERCIAL

## 2024-04-12 VITALS
OXYGEN SATURATION: 98 % | HEIGHT: 64 IN | BODY MASS INDEX: 30.73 KG/M2 | HEART RATE: 78 BPM | SYSTOLIC BLOOD PRESSURE: 147 MMHG | DIASTOLIC BLOOD PRESSURE: 88 MMHG | WEIGHT: 180 LBS

## 2024-04-12 PROCEDURE — 93000 ELECTROCARDIOGRAM COMPLETE: CPT

## 2024-04-12 PROCEDURE — G2211 COMPLEX E/M VISIT ADD ON: CPT

## 2024-04-12 PROCEDURE — 99215 OFFICE O/P EST HI 40 MIN: CPT

## 2024-04-12 NOTE — CARDIOLOGY SUMMARY
[de-identified] : NSR [de-identified] : [facility] ECHO CONCLUSIONS:    1. Technically difficult image quality.  2. Left ventricular systolic function is low-normal with a LVEF estimated at 50-55%.  3. Normal right ventricular cavity size, wall thickness, and systolic function.  4. Mild mitral regurgitation.  5. Mild aortic regurgitation.  6. Trace tricuspid regurgitation.  7. Small pericardial effusion noted adjacent to the posterior left ventricle associated with a large anterior fat pad, without diastolic RV collapse.  8. The inferior vena cava is normal in size measuring 1.33 cm in diameter, (normal <2.1cm) with normal inspiratory collapse (normal >50%) consistent with normal right atrial pressure (~3, range 0-5mmHg).  [___] : [unfilled] [None] : normal LV function

## 2024-04-12 NOTE — DISCUSSION/SUMMARY
[FreeTextEntry1] : This is a 73 year old man with a history of CAD s/p PCI to the proximal LAD and RI in January of 2016, hypertension PAF, and hyperlipidemia who presents to the office for follow up.     He was admitted with an ADHF event in the setting of uncontrolled HTN in December of 2023.  He was again admitted with AF with RVR and acute diastolic CHF on March of 2024.  He arrives today feeling well.  I sent him for cath, and he was noted to have patent stents,  of a diagonal and distal LCX, and an 80% proximal RCA stenosis. This was elected to manage medically as he had a new anemia with a 3g drop in his hemoglobin.  He was admitted for a GI evaluation, but signed out AMA.  He is still taking aspirin and Xarelto.  He has not had black stool for one week.   He has follow up with GI on Wednesday, and is going to need an EGD and colonoscopy  Eventually, he needs his degree of MR reassessed, and needs to be considered for an AF ablation.    He is euvolemic on exam, compensated from a volume perspective.    His blood pressure is controlled.  He will continue carvedilol 25 bid and diltiazem 120 BID.   He will continue Lasix 20 QD.  I am repeating a CBC and comprehensive panel today.  HE will follow in one month.  He knows to call the office with any issues.   He will continue ASA and atorvastatin 40mg daily for goal LDL <70.  He will continue xarelto 20mg daily for thromboembolic prevention.  He will followup again in one month.  [EKG obtained to assist in diagnosis and management of assessed problem(s)] : EKG obtained to assist in diagnosis and management of assessed problem(s)

## 2024-04-12 NOTE — HISTORY OF PRESENT ILLNESS
[FreeTextEntry1] : This is a 74 year old man with a history of CAD s/p PCI to the proximal LAD and RI in January of 2016, hypertension and hyperlipidemia who presents to the office for follow up.  He underwent nuclear stress testing in January of 2019 and was found to have a 9 METS exercise capacity with no symptoms.  He had a large area of inferior and inferolateral infarct, along with a medium sized area of mild mid to distal anterior wall ischemia.  We discussed the options, and given that his defect was mild, he had a robust exercise tolerance, and no symptoms, we elected to manage this medically.  He has not had any further chest pains since.   He has also been diagnosed with PAF.   He presented to [facility] ED on 12/21/23, BIBEMS with SOB, JORDAN and congestion which has worsened in last day. C-xray was done and showed b/l infiltrates, thought to be PNA. CT chest also done, showed B/L infiltrates vs edema. He was started on antibiotics and given 500cc IVF. Overnight a rapid response was called for increasing SOB, hypoxia (SpO2 71% on 100% nrb) and hypertensive urgency. He was started on BiPap, given IV Lasix 80mg x1 and started on a Nitro gtt.  A bedside POCUS was done and showed +B-lines but no evidence of right heart strain. He was subsequently transferred to the ICU for suspected flash pulmonary edema.   IN March he was again admitted with pulmonary edema and AF with RVR.  He developed a mild LEOBARDO.  He improved quickly, and was discharged with outpatient follow up.  He was started on diltiazem, and hydralazine was stopped.    He arrives today feeling well.  I sent him for cath, and he was noted to have patent stents,  of a diagonal and distal LCX, and an 80% proximal RCA stenosis. This was elected to manage medically as he had a new anemia with a 3g drop in his hemoglobin.  He was admitted for a GI evaluation, but signed out AMA.  He is still taking aspirin and Xarelto.  He has not had black stool for one week.  He denies chest pains or pressure. He  denies dizzy spells, feeling faint or fainting, He   denies palpitations or difficulty breathing while laying flat. He denies lower extremity swelling.   Repeat echo in our office showed normal LV function, severe LAE, and moderate MR.  He did have a large v wave on his right heart cath, which we may want to further assess his degree of MR in the future.

## 2024-04-12 NOTE — PHYSICAL EXAM
[Normal Appearance] : normal appearance [General Appearance - In No Acute Distress] : no acute distress [Normal Oral Mucosa] : normal oral mucosa [Normal Jugular Venous V Waves Present] : normal jugular venous V waves present [FreeTextEntry1] : No JVD [Respiration, Rhythm And Depth] : normal respiratory rhythm and effort [Exaggerated Use Of Accessory Muscles For Inspiration] : no accessory muscle use [Auscultation Breath Sounds / Voice Sounds] : lungs were clear to auscultation bilaterally [Bowel Sounds] : normal bowel sounds [Abdomen Soft] : soft [Abdomen Tenderness] : non-tender [Abnormal Walk] : normal gait [Gait - Sufficient For Exercise Testing] : the gait was sufficient for exercise testing [Cyanosis, Localized] : no localized cyanosis [Nail Clubbing] : no clubbing of the fingernails [Petechial Hemorrhages (___cm)] : no petechial hemorrhages [Skin Color & Pigmentation] : normal skin color and pigmentation [] : no rash [No Venous Stasis] : no venous stasis [Skin Lesions] : no skin lesions [Oriented To Time, Place, And Person] : oriented to person, place, and time [Affect] : the affect was normal [Mood] : the mood was normal [No Anxiety] : not feeling anxious [Not Palpable] : not palpable [Normal Rate] : normal [No Precordial Heave] : no precordial heave was noted [Irregularly Irregular] : irregularly irregular [2+] : left 2+ [Bruit] : no bruit heard [Well Developed] : well developed [Well Nourished] : well nourished [No Acute Distress] : no acute distress [Normal Conjunctiva] : normal conjunctiva [Normal Venous Pressure] : normal venous pressure [No Carotid Bruit] : no carotid bruit [Normal S1, S2] : normal S1, S2 [No Rub] : no rub [No Gallop] : no gallop [Rhythm Regular] : regular [Normal S1] : normal S1 [Normal S2] : normal S2 [No Murmur] : no murmurs heard [No Pitting Edema] : no pitting edema present [Right Carotid Bruit] : no bruit heard over the right carotid [Left Carotid Bruit] : no bruit heard over the left carotid [No Abnormalities] : the abdominal aorta was not enlarged and no bruit was heard [Clear Lung Fields] : clear lung fields [Good Air Entry] : good air entry [No Respiratory Distress] : no respiratory distress  [Soft] : abdomen soft [Non Tender] : non-tender [No Masses/organomegaly] : no masses/organomegaly [Normal Bowel Sounds] : normal bowel sounds [Normal Gait] : normal gait [No Edema] : no edema [No Cyanosis] : no cyanosis [No Clubbing] : no clubbing [No Varicosities] : no varicosities [No Rash] : no rash [No Skin Lesions] : no skin lesions [Moves all extremities] : moves all extremities [No Focal Deficits] : no focal deficits [Normal Speech] : normal speech [Alert and Oriented] : alert and oriented [Normal memory] : normal memory

## 2024-04-15 LAB
ALBUMIN SERPL ELPH-MCNC: 4.7 G/DL
ALP BLD-CCNC: 116 U/L
ALT SERPL-CCNC: 12 U/L
ANION GAP SERPL CALC-SCNC: 15 MMOL/L
AST SERPL-CCNC: 16 U/L
BILIRUB SERPL-MCNC: 0.4 MG/DL
BUN SERPL-MCNC: 31 MG/DL
CALCIUM SERPL-MCNC: 9.6 MG/DL
CHLORIDE SERPL-SCNC: 104 MMOL/L
CO2 SERPL-SCNC: 22 MMOL/L
CREAT SERPL-MCNC: 2.56 MG/DL
EGFR: 26 ML/MIN/1.73M2
GLUCOSE SERPL-MCNC: 116 MG/DL
HCT VFR BLD CALC: 33.6 %
HGB BLD-MCNC: 10.5 G/DL
MCHC RBC-ENTMCNC: 24.2 PG
MCHC RBC-ENTMCNC: 31.3 GM/DL
MCV RBC AUTO: 77.6 FL
PLATELET # BLD AUTO: 275 K/UL
POTASSIUM SERPL-SCNC: 4.2 MMOL/L
PROT SERPL-MCNC: 7 G/DL
RBC # BLD: 4.33 M/UL
RBC # FLD: 17.5 %
SODIUM SERPL-SCNC: 141 MMOL/L
WBC # FLD AUTO: 7.08 K/UL

## 2024-05-13 NOTE — DISCHARGE NOTE NURSING/CASE MANAGEMENT/SOCIAL WORK - CAREGIVER PHONE NUMBER
Attending Attestation: The patient was seen and examined at the bedside with housestaff.  I was present during the key elements of the history, examination and decision-making. I agree with the recommendations as stated below.    Consultation  ESRD on PD    Nephrology consult hx 5/13/2024  Maria lEena is a 73 year old female with PMH stage IV DLBCL (ABC subtype, myc rearrangement -, IPI 2), ESRD (on PD), HTN, HLD, and hypothyroidism that presented to [facility] for PLT transfusion in preparation for diagnostic LP. Nephrology consulted for PD management.     Patient was initially on HD has been on PD since 09/2023 with Dr. Chawla. Patient does produce urine. Patient produces urine still, around 200 cc/day.   Otherwise, patient denies fevers, chills, CP, SOB, N/V/D/C, melena, hematochezia, dysuria, hematuria.     PD: 8.5 hrs, 2400 ml x4 1.5%, 1L LBF 1.5%  Epo 40K qMon/Thur  Blood transfusion 5/5/2024    Current Facility-Administered Medications   Medication Dose Route Frequency Provider Last Rate Last Admin    sodium chloride 0.9 % flush bag 25 mL  25 mL Intravenous PRN Fiona Smith MD        sodium chloride 0.9 % injection 2 mL  2 mL Intracatheter 2 times per day Fiona Smith MD        magnesium sulfate 2 g in 50 mL premix IVPB  2 g Intravenous Once Fiona Smith MD        Followed by    magnesium sulfate 2 g in 50 mL premix IVPB  2 g Intravenous Once Fiona Smith MD        sodium chloride 0.9% infusion   Intravenous Continuous PRN Fiona Smith MD        dextrose 50 % injection 25 g  25 g Intravenous PRN Fiona Smith MD        dextrose 50 % injection 12.5 g  12.5 g Intravenous PRN Fiona Smith MD        glucagon (GLUCAGEN) injection 1 mg  1 mg Intramuscular PRN Fiona Smith MD        dextrose (GLUTOSE) 40 % gel 15 g  15 g Oral PRN Fiona Smith MD        dextrose (GLUTOSE) 40 % gel 30 g  30 g Oral PRN Fiona Smith MD        acyclovir (ZOVIRAX) capsule 200 mg  200 mg Oral BID Luis  MD Fiona        allopurinol (ZYLOPRIM) tablet 300 mg  300 mg Oral Daily Fiona Smith MD        levothyroxine (SYNTHROID, LEVOTHROID) tablet 88 mcg  88 mcg Oral QAM Fiona Larkin MD        midodrine (PROAMATINE) tablet 10 mg  10 mg Oral TID Fiona Larkin MD         Past Medical History:   Diagnosis Date    Abdominal wall mass 10/05/2021    Abnormal nuclear stress test 10/20/2021    Acute cholecystitis 04/24/2023    Biliary stones     Blood clot associated with vein wall inflammation 10/26/2021    LLE    Chemotherapy-induced neuropathy  (CMD)     Chronic pain     back    Coronary artery disease     followed by Dr. 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 Followed by Dr. Gómez Ramirez    Neutropenic fever (CMD) 11/10/2021    Osteomyelitis of lumbar spine  (CMD) 11/30/2022    6 weeks of IV antibiotics    Pancytopenia due to chemotherapy (CMD) 11/10/2021    Parotid mass 08/11/2022    RSV (respiratory syncytial virus infection) 12/22/2022    Sepsis  (CMD) 09/19/2022    febrile neutopenia E Coli/Proteus bacteremia, cellulitis RLE and left neck surgical site infection    Stress and adjustment reaction 10/20/2021    Toxic metabolic encephalopathy 11/09/2022      Past Surgical History:   Procedure Laterality Date    Arthrotomy,open repair meniscus Left     Ir ivc filter retrieval/removal s&i  2022    Ir kidney biopsy  02/10/2023    Lymph node biopsy  10/08/2021    left iliac    Open access colonoscopy      x3    Removal gallbladder  06/2023    Salivary  gland surgery  08/15/2022    left parotid excision. pathology shows relapsed CD30 positive DLBCL, non-germinal center subtype.  Cells positive for CD20,c-Myc (30%) by IHC.  FISH neg for MYC rearrangement    Tubal ligation  1980     Social History     Tobacco Use    Smoking status: Never    Smokeless tobacco: Never   Vaping Use    Vaping status: never used   Substance Use Topics    Alcohol use: Never    Drug use: Never       Family History   Problem Relation Age of Onset    COPD Mother     Coronary Artery Disease Mother     Hyperlipidemia Mother     Heart disease Mother     Heart disease Father     COPD Father     Cancer, Lung Maternal Aunt      ALLERGIES:  Chlorhexidine, Hydromorphone, Contrast media, Levaquin, and Adhesive   (environmental)  Medications Prior to Admission   Medication Sig Dispense Refill    olopatadine (PATADAY) 0.2 % ophthalmic solution Place 1 drop into both eyes daily. 2.5 mL 1    docusate sodium (COLACE) 100 MG capsule Take 300 mg by mouth nightly.      TRUEplus Lancets 33G Misc USE THREE TIMES DAILY 100 each 1    blood glucose (True Metrix Blood Glucose Test) test strip TEST THREE TIMES DAILY 100 strip 1    Insulin Lispro w/ Trans Port (HumaLOG Tempo Pen) 100 UNIT/ML Solution Pen-injector Inject 5 Units into the skin in the morning and 5 Units at noon and 5 Units in the evening. Prime 2 units before each dose. 100 mL 0    Insulin Pen Needle 32G X 5 MM Misc Use to injection insulin 3 times daily.  Remove needle cover(s) to expose needle before injecting. 100 each 3    Continuous Blood Gluc Sensor Misc 1 Device 4 times daily. 1 each 1    acyclovir (ZOVIRAX) 200 MG capsule Take 1 capsule by mouth in the morning and 1 capsule in the evening. 60 capsule 1    allopurinol (ZYLOPRIM) 300 MG tablet TAKE 1 TABLET BY MOUTH DAILY 30 tablet 0    B Complex-C-Folic Acid (Mary-Del) Tab TAKE 1 TABLET BY MOUTH DAILY. DO NOT. START BEFORE MARCH 29, 2024 90 tablet 3    calcium acetate gelcap (PHOSLO) 667 MG  capsule Take 2 capsules by mouth in the morning and 2 capsules at noon and 2 capsules in the evening. Take with meals. 180 capsule 0    levothyroxine 88 MCG tablet Take 1 tablet by mouth daily (before breakfast). 90 tablet 3    Multiple Vitamins-Minerals (RENAPLEX PO) Take 1 capsule by mouth daily.      midodrine (PROAMATINE) 10 MG tablet Take 1 tablet by mouth 3 times daily (before meals). Used to treat orthostatic hypotension (Patient taking differently: Take 10 mg by mouth 3 times daily (before meals). Holds if SBP is >119.) 90 tablet 0       Visit Vitals  BP 97/65 (BP Location: LUE - Left upper extremity, Patient Position: Sitting)   Pulse 96   Temp 97.9 °F (36.6 °C) (Oral)   Resp 18   Ht 5' 4.96\" (1.65 m)   Wt 89.5 kg (197 lb 5 oz)   SpO2 94%   BMI 32.87 kg/m²     Physical Exam  Constitutional:       Appearance: Normal appearance.   HENT:      Head: Normocephalic and atraumatic.      Right Ear: External ear normal.      Left Ear: External ear normal.      Nose: Nose normal.      Mouth/Throat:      Mouth: Mucous membranes are moist.      Pharynx: Oropharynx is clear.   Eyes:      Extraocular Movements: Extraocular movements intact.      Pupils: Pupils are equal, round, and reactive to light.   Cardiovascular:      Rate and Rhythm: Normal rate and regular rhythm.   Pulmonary:      Effort: Pulmonary effort is normal.      Breath sounds: Normal breath sounds.   Abdominal:      General: Abdomen is flat. Bowel sounds are normal. There is no distension.      Tenderness: There is no abdominal tenderness. There is no guarding or rebound.      Comments: PD catheter   Musculoskeletal:      Right lower leg: Edema present.      Left lower leg: Edema present.   Skin:     General: Skin is warm and dry.   Neurological:      General: No focal deficit present.      Mental Status: She is alert and oriented to person, place, and time.   Psychiatric:         Mood and Affect: Mood normal.         Behavior: Behavior normal.          Recent Labs   Lab 05/13/24  0836 05/10/24  1039   SODIUM 137 137   POTASSIUM 3.6 4.0   CHLORIDE 107 106   CO2 20* 25   BUN 51* 53*   CREATININE 6.27* 6.42*   CALCIUM 8.6 8.7   MG 1.6*  --    PHOS 4.7 4.3   ALBUMIN 2.6* 2.4*   BILIRUBIN 0.8 0.7   ALKPT 80 69   GPT 24 23   AST 20 18   GLUCOSE 155* 253*     Recent Labs   Lab 05/13/24  0836 05/10/24  1039   WBC 2.2* 1.8*   HGB 9.1* 6.8*   HCT 26.7* 19.9*   PLT 31* 25*           Impression  - Stage IV DLBCL with admission for LP  - ESRD on PD with minimal UOP  - HTN/hypotension controlled  - Hypomag a/w PPI  - Anemia a/w ESRD and myelofibrosis on SHANNON, high ferritin  - Renal SHPT     Plan  -PD: 8.5hrs, 2.4L x 4 exchanges of 1.5%/2.5% mix + LBF 1L 2.5%, all low Ca   -Midodrine 10 mg TID   -Epogen 40,000 units sq Mon & Thur  -Nephro-Del 0.8 mg daily   -Calcium acetate 1.334 g TID  -Low phos, low sodium diet   -Mag replaced earlier  -Strict Is/Os   -Dose for eGFR <15 mL/min     Tra Payne DO  Internal Medicine PGY-1    Seen and discussed with Dr. Lozano.     Attending Attestation: The patient was seen and examined at the bedside with housestaff.  I was present during the key elements of the history, examination and decision-making. I agree with the recommendations as stated above.    Karishma Lozano MD  [facility] [place]  Contact via [employer]  Office/Answering service [phone]     [phone], [place], cell [phone], [place], cell [phone]

## 2024-05-31 ENCOUNTER — NON-APPOINTMENT (OUTPATIENT)
Age: 74
End: 2024-05-31

## 2024-05-31 ENCOUNTER — APPOINTMENT (OUTPATIENT)
Dept: CARDIOLOGY | Facility: CLINIC | Age: 74
End: 2024-05-31
Payer: COMMERCIAL

## 2024-05-31 VITALS
BODY MASS INDEX: 30.39 KG/M2 | HEIGHT: 64 IN | SYSTOLIC BLOOD PRESSURE: 151 MMHG | WEIGHT: 178 LBS | HEART RATE: 98 BPM | DIASTOLIC BLOOD PRESSURE: 101 MMHG | OXYGEN SATURATION: 99 %

## 2024-05-31 DIAGNOSIS — I48.91 UNSPECIFIED ATRIAL FIBRILLATION: ICD-10-CM

## 2024-05-31 DIAGNOSIS — I50.30 UNSPECIFIED DIASTOLIC (CONGESTIVE) HEART FAILURE: ICD-10-CM

## 2024-05-31 DIAGNOSIS — I25.10 ATHEROSCLEROTIC HEART DISEASE OF NATIVE CORONARY ARTERY W/OUT ANGINA PECTORIS: ICD-10-CM

## 2024-05-31 PROCEDURE — 93000 ELECTROCARDIOGRAM COMPLETE: CPT

## 2024-05-31 PROCEDURE — 99215 OFFICE O/P EST HI 40 MIN: CPT

## 2024-05-31 PROCEDURE — G2211 COMPLEX E/M VISIT ADD ON: CPT

## 2024-05-31 RX ORDER — HYDRALAZINE HYDROCHLORIDE 25 MG/1
25 TABLET ORAL TWICE DAILY
Qty: 180 | Refills: 1 | Status: ACTIVE | COMMUNITY
Start: 2024-05-31 | End: 1900-01-01

## 2024-05-31 NOTE — CARDIOLOGY SUMMARY
[de-identified] : Atrial fibrillation.  [de-identified] : [facility] ECHO CONCLUSIONS:    1. Technically difficult image quality.  2. Left ventricular systolic function is low-normal with a LVEF estimated at 50-55%.  3. Normal right ventricular cavity size, wall thickness, and systolic function.  4. Mild mitral regurgitation.  5. Mild aortic regurgitation.  6. Trace tricuspid regurgitation.  7. Small pericardial effusion noted adjacent to the posterior left ventricle associated with a large anterior fat pad, without diastolic RV collapse.  8. The inferior vena cava is normal in size measuring 1.33 cm in diameter, (normal <2.1cm) with normal inspiratory collapse (normal >50%) consistent with normal right atrial pressure (~3, range 0-5mmHg).  [___] : [unfilled] [None] : normal LV function

## 2024-05-31 NOTE — DISCUSSION/SUMMARY
[FreeTextEntry1] : This is a 73 year old man with a history of CAD s/p PCI to the proximal LAD and RI in January of 2016, hypertension PAF, and hyperlipidemia who presents to the office for follow up.     He was admitted with an ADHF event in the setting of uncontrolled HTN in December of 2023.  He was again admitted with AF with RVR and acute diastolic CHF on March of 2024.  He arrives today feeling well.  I sent him for cath, and he was noted to have patent stents,  of a diagonal and distal LCX, and an 80% proximal RCA stenosis. This was elected to manage medically as he had a new anemia with a 3g drop in his hemoglobin.  He was admitted for a GI evaluation, but signed out AMA.  He has not yet had his GI evaluation, noting that he is busy moving.  I stressed the importance of this . I am repeating a CBC and a CMP today.  He is still taking aspirin and Xarelto.  He has not had black stools.  Eventually, he needs his degree of MR reassessed, and needs to be considered for an AF ablation.    He is euvolemic on exam, compensated from a volume perspective.    His blood pressure is uncontrolled.  I am resuming hydralazine at 25 bid.  He will continue carvedilol 25 bid and diltiazem 120 BID.   He will continue Lasix 20 QD.  I am repeating a CBC and comprehensive panel today.  HE will follow in one month.  He knows to call the office with any issues.   He will continue ASA and atorvastatin 40mg daily for goal LDL <70.  He will continue xarelto 20mg daily for thromboembolic prevention.  He will followup again in one month.  [EKG obtained to assist in diagnosis and management of assessed problem(s)] : EKG obtained to assist in diagnosis and management of assessed problem(s)

## 2024-05-31 NOTE — PHYSICAL EXAM
[Normal Appearance] : normal appearance [General Appearance - In No Acute Distress] : no acute distress [Normal Oral Mucosa] : normal oral mucosa [Normal Jugular Venous V Waves Present] : normal jugular venous V waves present [FreeTextEntry1] : No JVD [Respiration, Rhythm And Depth] : normal respiratory rhythm and effort [Exaggerated Use Of Accessory Muscles For Inspiration] : no accessory muscle use [Auscultation Breath Sounds / Voice Sounds] : lungs were clear to auscultation bilaterally [Bowel Sounds] : normal bowel sounds [Abdomen Soft] : soft [Abdomen Tenderness] : non-tender [Abnormal Walk] : normal gait [Gait - Sufficient For Exercise Testing] : the gait was sufficient for exercise testing [Nail Clubbing] : no clubbing of the fingernails [Cyanosis, Localized] : no localized cyanosis [Petechial Hemorrhages (___cm)] : no petechial hemorrhages [Skin Color & Pigmentation] : normal skin color and pigmentation [] : no rash [No Venous Stasis] : no venous stasis [Skin Lesions] : no skin lesions [Oriented To Time, Place, And Person] : oriented to person, place, and time [Affect] : the affect was normal [Mood] : the mood was normal [No Anxiety] : not feeling anxious [Not Palpable] : not palpable [No Precordial Heave] : no precordial heave was noted [Normal Rate] : normal [Irregularly Irregular] : irregularly irregular [2+] : left 2+ [Bruit] : no bruit heard [Well Developed] : well developed [Well Nourished] : well nourished [No Acute Distress] : no acute distress [Normal Conjunctiva] : normal conjunctiva [Normal Venous Pressure] : normal venous pressure [No Carotid Bruit] : no carotid bruit [Normal S1, S2] : normal S1, S2 [No Rub] : no rub [No Gallop] : no gallop [Rhythm Regular] : regular [Normal S1] : normal S1 [Normal S2] : normal S2 [No Murmur] : no murmurs heard [No Pitting Edema] : no pitting edema present [Right Carotid Bruit] : no bruit heard over the right carotid [Left Carotid Bruit] : no bruit heard over the left carotid [No Abnormalities] : the abdominal aorta was not enlarged and no bruit was heard [Clear Lung Fields] : clear lung fields [Good Air Entry] : good air entry [No Respiratory Distress] : no respiratory distress  [Soft] : abdomen soft [Non Tender] : non-tender [No Masses/organomegaly] : no masses/organomegaly [Normal Bowel Sounds] : normal bowel sounds [Normal Gait] : normal gait [No Edema] : no edema [No Cyanosis] : no cyanosis [No Clubbing] : no clubbing [No Varicosities] : no varicosities [No Rash] : no rash [No Skin Lesions] : no skin lesions [Moves all extremities] : moves all extremities [No Focal Deficits] : no focal deficits [Normal Speech] : normal speech [Alert and Oriented] : alert and oriented [Normal memory] : normal memory

## 2024-05-31 NOTE — HISTORY OF PRESENT ILLNESS
[FreeTextEntry1] : This is a 74 year old man with a history of CAD s/p PCI to the proximal LAD and RI in January of 2016, hypertension and hyperlipidemia who presents to the office for follow up.  He underwent nuclear stress testing in January of 2019 and was found to have a 9 METS exercise capacity with no symptoms.  He had a large area of inferior and inferolateral infarct, along with a medium sized area of mild mid to distal anterior wall ischemia.  We discussed the options, and given that his defect was mild, he had a robust exercise tolerance, and no symptoms, we elected to manage this medically.  He has not had any further chest pains since.   He has also been diagnosed with PAF.   He presented to [facility] ED on 12/21/23, BIBEMS with SOB, JORDAN and congestion which has worsened in last day. C-xray was done and showed b/l infiltrates, thought to be PNA. CT chest also done, showed B/L infiltrates vs edema. He was started on antibiotics and given 500cc IVF. Overnight a rapid response was called for increasing SOB, hypoxia (SpO2 71% on 100% nrb) and hypertensive urgency. He was started on BiPap, given IV Lasix 80mg x1 and started on a Nitro gtt.  A bedside POCUS was done and showed +B-lines but no evidence of right heart strain. He was subsequently transferred to the ICU for suspected flash pulmonary edema.   IN March he was again admitted with pulmonary edema and AF with RVR.  He developed a mild LEOBARDO.  He improved quickly, and was discharged with outpatient follow up.  He was started on diltiazem, and hydralazine was stopped.    He arrives today feeling well.  I sent him for cath, and he was noted to have patent stents,  of a diagonal and distal LCX, and an 80% proximal RCA stenosis. This was elected to manage medically as he had a new anemia with a 3g drop in his hemoglobin.  He was admitted for a GI evaluation, but signed out AMA.  He has not yet scheduled his endoscopy, noting that he is busy moving.   He is still taking aspirin and Xarelto.  He has not had black stools.  He denies chest pains or pressure. He  denies dizzy spells, feeling faint or fainting, He   denies palpitations or difficulty breathing while laying flat. He denies lower extremity swelling.   Repeat echo in our office showed normal LV function, severe LAE, and moderate MR.  He did have a large v wave on his right heart cath, which we may want to further assess his degree of MR in the future.  His BP is still uncontrolled.

## 2024-06-03 LAB
ALBUMIN SERPL ELPH-MCNC: 4.6 G/DL
ALP BLD-CCNC: 135 U/L
ALT SERPL-CCNC: 14 U/L
ANION GAP SERPL CALC-SCNC: 14 MMOL/L
AST SERPL-CCNC: 15 U/L
BILIRUB SERPL-MCNC: 0.4 MG/DL
BUN SERPL-MCNC: 37 MG/DL
CALCIUM SERPL-MCNC: 9.6 MG/DL
CHLORIDE SERPL-SCNC: 104 MMOL/L
CO2 SERPL-SCNC: 23 MMOL/L
CREAT SERPL-MCNC: 2.38 MG/DL
EGFR: 28 ML/MIN/1.73M2
GLUCOSE SERPL-MCNC: 113 MG/DL
HCT VFR BLD CALC: 41.6 %
HGB BLD-MCNC: 13.1 G/DL
MCHC RBC-ENTMCNC: 23.5 PG
MCHC RBC-ENTMCNC: 31.5 GM/DL
MCV RBC AUTO: 74.7 FL
PLATELET # BLD AUTO: 210 K/UL
POTASSIUM SERPL-SCNC: 4.2 MMOL/L
PROT SERPL-MCNC: 7.7 G/DL
RBC # BLD: 5.57 M/UL
RBC # FLD: 15.7 %
SODIUM SERPL-SCNC: 141 MMOL/L
WBC # FLD AUTO: 7.21 K/UL

## 2024-06-06 ENCOUNTER — RX RENEWAL (OUTPATIENT)
Age: 74
End: 2024-06-06

## 2024-06-06 RX ORDER — FUROSEMIDE 20 MG/1
20 TABLET ORAL
Qty: 90 | Refills: 0 | Status: ACTIVE | COMMUNITY
Start: 2024-03-20 | End: 1900-01-01

## 2024-06-20 NOTE — DISCHARGE NOTE PROVIDER - CARE PROVIDER_API CALL
Discharge Instructions: Avoid direct and prolonged immersion of wound until skin has completely closed. The padded dressing used for compression can be removed immediately if necessary but would be best left in place for first 24 hours. If needed, elevating the involved hand above the level of the chest and icing is recommended for pain and swelling. Sutures will be removed in clinic in 7-10 days from surgery.  If you develop an extremely painful/swollen hand or fingers you should immediately contact the surgeon and go to the nearest emergency room for evaluation.   
Ezekiel Gillespie  Cardiology  [address] [place] [address]  Phone: [phone]  Fax: [phone]  Established Patient  Follow Up Time: 1 week    [facility]Te Jur[facility]  Nephrology  [address], [address]  [place] [address]  Phone: [phone]  Fax: [phone]  Follow Up Time: 2 weeks    MAGGI SCHNEIDER, JOHNY BECKETT  Phone: [phone]  Fax: [phone]  Established Patient  Follow Up Time: 1 week

## 2024-07-19 ENCOUNTER — NON-APPOINTMENT (OUTPATIENT)
Age: 74
End: 2024-07-19

## 2024-07-19 ENCOUNTER — APPOINTMENT (OUTPATIENT)
Dept: CARDIOLOGY | Facility: CLINIC | Age: 74
End: 2024-07-19
Payer: COMMERCIAL

## 2024-07-19 VITALS
SYSTOLIC BLOOD PRESSURE: 143 MMHG | HEIGHT: 64 IN | DIASTOLIC BLOOD PRESSURE: 100 MMHG | BODY MASS INDEX: 30.73 KG/M2 | WEIGHT: 180 LBS | HEART RATE: 90 BPM | OXYGEN SATURATION: 98 %

## 2024-07-19 VITALS — SYSTOLIC BLOOD PRESSURE: 136 MMHG | DIASTOLIC BLOOD PRESSURE: 90 MMHG

## 2024-07-19 DIAGNOSIS — I25.10 ATHEROSCLEROTIC HEART DISEASE OF NATIVE CORONARY ARTERY W/OUT ANGINA PECTORIS: ICD-10-CM

## 2024-07-19 DIAGNOSIS — I50.30 UNSPECIFIED DIASTOLIC (CONGESTIVE) HEART FAILURE: ICD-10-CM

## 2024-07-19 PROCEDURE — G2211 COMPLEX E/M VISIT ADD ON: CPT

## 2024-07-19 PROCEDURE — 99214 OFFICE O/P EST MOD 30 MIN: CPT

## 2024-07-19 PROCEDURE — 93000 ELECTROCARDIOGRAM COMPLETE: CPT

## 2024-08-30 ENCOUNTER — NON-APPOINTMENT (OUTPATIENT)
Age: 74
End: 2024-08-30

## 2024-08-30 ENCOUNTER — APPOINTMENT (OUTPATIENT)
Dept: CARDIOLOGY | Facility: CLINIC | Age: 74
End: 2024-08-30
Payer: COMMERCIAL

## 2024-08-30 VITALS
HEART RATE: 90 BPM | WEIGHT: 179 LBS | BODY MASS INDEX: 30.56 KG/M2 | DIASTOLIC BLOOD PRESSURE: 99 MMHG | OXYGEN SATURATION: 100 % | HEIGHT: 64 IN | SYSTOLIC BLOOD PRESSURE: 160 MMHG

## 2024-08-30 DIAGNOSIS — I25.10 ATHEROSCLEROTIC HEART DISEASE OF NATIVE CORONARY ARTERY W/OUT ANGINA PECTORIS: ICD-10-CM

## 2024-08-30 DIAGNOSIS — I48.91 UNSPECIFIED ATRIAL FIBRILLATION: ICD-10-CM

## 2024-08-30 PROCEDURE — 93000 ELECTROCARDIOGRAM COMPLETE: CPT

## 2024-08-30 PROCEDURE — 99214 OFFICE O/P EST MOD 30 MIN: CPT

## 2024-08-30 PROCEDURE — G2211 COMPLEX E/M VISIT ADD ON: CPT

## 2024-08-30 NOTE — CARDIOLOGY SUMMARY
[de-identified] : Atrial fibrillation.  [de-identified] : [facility] ECHO CONCLUSIONS:    1. Technically difficult image quality.  2. Left ventricular systolic function is low-normal with a LVEF estimated at 50-55%.  3. Normal right ventricular cavity size, wall thickness, and systolic function.  4. Mild mitral regurgitation.  5. Mild aortic regurgitation.  6. Trace tricuspid regurgitation.  7. Small pericardial effusion noted adjacent to the posterior left ventricle associated with a large anterior fat pad, without diastolic RV collapse.  8. The inferior vena cava is normal in size measuring 1.33 cm in diameter, (normal <2.1cm) with normal inspiratory collapse (normal >50%) consistent with normal right atrial pressure (~3, range 0-5mmHg).  [___] : [unfilled] [None] : normal LV function

## 2024-08-30 NOTE — DISCUSSION/SUMMARY
[FreeTextEntry1] : This is a 74 year old man with a history of CAD s/p PCI to the proximal LAD and RI in January of 2016, hypertension PAF, and hyperlipidemia who presents to the office for follow up.     He was admitted with an ADHF event in the setting of uncontrolled HTN in December of 2023.  He was again admitted with AF with RVR and acute diastolic CHF on March of 2024.  He arrives today feeling well.  I sent him for cath, and he was noted to have patent stents,  of a diagonal and distal LCX, and an 80% proximal RCA stenosis. This was elected to manage medically as he had a new anemia with a 3g drop in his hemoglobin.  He was admitted for a GI evaluation, but signed out AMA.  He has not yet had his GI evaluation, noting that he is busy with his new house.  HE states he has not unpacked yet.   I stressed the importance of this .  His repeat CBC showed that his H/H recovered.   He is still taking aspirin and Xarelto.  He has not had black stools.  Eventually, he needs his degree of MR reassessed, and needs to be considered for an AF ablation.    He is euvolemic on exam, compensated from a volume perspective.    His blood pressure is uncontrolled.  I am increasing hydralazine to 100 bid.    He will continue carvedilol 25 bid and diltiazem 120 BID.   He will continue Lasix 20 QD.  HE will follow in one month.  He knows to call the office with any issues.   He will continue ASA and atorvastatin 40mg daily for goal LDL <70.  He will continue xarelto 20mg daily for thromboembolic prevention.     [EKG obtained to assist in diagnosis and management of assessed problem(s)] : EKG obtained to assist in diagnosis and management of assessed problem(s)

## 2024-09-03 LAB
HDL-C: 41 MG/DL
HDL-P (TOTAL): 21.5 UMOL/L
LDL SIZE: 20.6 NM
LDL-C: 88 MG/DL
LDL-P: 960 NMOL/L
LP-IR SCORE: 39
NMR CHOLESTEROL, TOTAL: 145 MG/DL
SMALL LDL-P: 449 NMOL/L
TRIGL SERPL-MCNC: 83 MG/DL

## 2024-10-25 ENCOUNTER — APPOINTMENT (OUTPATIENT)
Dept: CARDIOLOGY | Facility: CLINIC | Age: 74
End: 2024-10-25
Payer: COMMERCIAL

## 2024-10-25 ENCOUNTER — NON-APPOINTMENT (OUTPATIENT)
Age: 74
End: 2024-10-25

## 2024-10-25 VITALS
SYSTOLIC BLOOD PRESSURE: 142 MMHG | OXYGEN SATURATION: 100 % | DIASTOLIC BLOOD PRESSURE: 96 MMHG | BODY MASS INDEX: 30.39 KG/M2 | HEIGHT: 64 IN | WEIGHT: 178 LBS | HEART RATE: 84 BPM

## 2024-10-25 DIAGNOSIS — I25.10 ATHEROSCLEROTIC HEART DISEASE OF NATIVE CORONARY ARTERY W/OUT ANGINA PECTORIS: ICD-10-CM

## 2024-10-25 DIAGNOSIS — I48.91 UNSPECIFIED ATRIAL FIBRILLATION: ICD-10-CM

## 2024-10-25 PROCEDURE — G2211 COMPLEX E/M VISIT ADD ON: CPT

## 2024-10-25 PROCEDURE — 93000 ELECTROCARDIOGRAM COMPLETE: CPT

## 2024-10-25 PROCEDURE — 99214 OFFICE O/P EST MOD 30 MIN: CPT

## 2024-12-20 ENCOUNTER — RESULT REVIEW (OUTPATIENT)
Age: 74
End: 2024-12-20

## 2025-01-14 ENCOUNTER — RX RENEWAL (OUTPATIENT)
Age: 75
End: 2025-01-14

## 2025-01-14 ENCOUNTER — APPOINTMENT (OUTPATIENT)
Dept: CARDIOLOGY | Facility: CLINIC | Age: 75
End: 2025-01-14

## 2025-02-07 ENCOUNTER — APPOINTMENT (OUTPATIENT)
Dept: CARDIOLOGY | Facility: CLINIC | Age: 75
End: 2025-02-07
Payer: COMMERCIAL

## 2025-02-07 ENCOUNTER — NON-APPOINTMENT (OUTPATIENT)
Age: 75
End: 2025-02-07

## 2025-02-07 VITALS
SYSTOLIC BLOOD PRESSURE: 124 MMHG | HEIGHT: 64 IN | DIASTOLIC BLOOD PRESSURE: 83 MMHG | OXYGEN SATURATION: 98 % | HEART RATE: 104 BPM | BODY MASS INDEX: 30.05 KG/M2 | WEIGHT: 176 LBS

## 2025-02-07 DIAGNOSIS — I48.91 UNSPECIFIED ATRIAL FIBRILLATION: ICD-10-CM

## 2025-02-07 DIAGNOSIS — I50.30 UNSPECIFIED DIASTOLIC (CONGESTIVE) HEART FAILURE: ICD-10-CM

## 2025-02-07 PROCEDURE — 93000 ELECTROCARDIOGRAM COMPLETE: CPT

## 2025-02-07 PROCEDURE — 99215 OFFICE O/P EST HI 40 MIN: CPT

## 2025-02-07 RX ORDER — DILTIAZEM HYDROCHLORIDE 240 MG/1
240 CAPSULE, EXTENDED RELEASE ORAL
Qty: 30 | Refills: 2 | Status: ACTIVE | COMMUNITY
Start: 2025-02-07

## 2025-02-20 ENCOUNTER — RX RENEWAL (OUTPATIENT)
Age: 75
End: 2025-02-20

## 2025-03-28 ENCOUNTER — NON-APPOINTMENT (OUTPATIENT)
Age: 75
End: 2025-03-28

## 2025-03-28 ENCOUNTER — APPOINTMENT (OUTPATIENT)
Dept: CARDIOLOGY | Facility: CLINIC | Age: 75
End: 2025-03-28
Payer: COMMERCIAL

## 2025-03-28 VITALS
BODY MASS INDEX: 30.22 KG/M2 | HEIGHT: 64 IN | WEIGHT: 177 LBS | OXYGEN SATURATION: 100 % | HEART RATE: 97 BPM | SYSTOLIC BLOOD PRESSURE: 144 MMHG | DIASTOLIC BLOOD PRESSURE: 92 MMHG

## 2025-03-28 VITALS — SYSTOLIC BLOOD PRESSURE: 124 MMHG | DIASTOLIC BLOOD PRESSURE: 84 MMHG

## 2025-03-28 DIAGNOSIS — I48.91 UNSPECIFIED ATRIAL FIBRILLATION: ICD-10-CM

## 2025-03-28 DIAGNOSIS — I50.30 UNSPECIFIED DIASTOLIC (CONGESTIVE) HEART FAILURE: ICD-10-CM

## 2025-03-28 PROCEDURE — G2211 COMPLEX E/M VISIT ADD ON: CPT

## 2025-03-28 PROCEDURE — 99214 OFFICE O/P EST MOD 30 MIN: CPT

## 2025-03-28 PROCEDURE — 93000 ELECTROCARDIOGRAM COMPLETE: CPT

## 2025-04-07 ENCOUNTER — TRANSCRIPTION ENCOUNTER (OUTPATIENT)
Age: 75
End: 2025-04-07

## 2025-04-21 ENCOUNTER — TRANSCRIPTION ENCOUNTER (OUTPATIENT)
Age: 75
End: 2025-04-21

## 2025-04-28 ENCOUNTER — TRANSCRIPTION ENCOUNTER (OUTPATIENT)
Age: 75
End: 2025-04-28

## 2025-04-29 ENCOUNTER — RX RENEWAL (OUTPATIENT)
Age: 75
End: 2025-04-29

## 2025-06-27 ENCOUNTER — NON-APPOINTMENT (OUTPATIENT)
Age: 75
End: 2025-06-27

## 2025-06-27 ENCOUNTER — APPOINTMENT (OUTPATIENT)
Dept: CARDIOLOGY | Facility: CLINIC | Age: 75
End: 2025-06-27
Payer: COMMERCIAL

## 2025-06-27 VITALS
WEIGHT: 186 LBS | SYSTOLIC BLOOD PRESSURE: 148 MMHG | DIASTOLIC BLOOD PRESSURE: 99 MMHG | OXYGEN SATURATION: 99 % | BODY MASS INDEX: 31.76 KG/M2 | HEART RATE: 91 BPM | HEIGHT: 64 IN

## 2025-06-27 PROCEDURE — 99214 OFFICE O/P EST MOD 30 MIN: CPT

## 2025-06-27 PROCEDURE — 93000 ELECTROCARDIOGRAM COMPLETE: CPT

## 2025-06-27 PROCEDURE — G2211 COMPLEX E/M VISIT ADD ON: CPT

## 2025-06-30 LAB
ALBUMIN SERPL ELPH-MCNC: 4.8 G/DL
ALP BLD-CCNC: 134 U/L
ALT SERPL-CCNC: 21 U/L
ANION GAP SERPL CALC-SCNC: 21 MMOL/L
AST SERPL-CCNC: 21 U/L
BILIRUB SERPL-MCNC: 0.4 MG/DL
BUN SERPL-MCNC: 35 MG/DL
CALCIUM SERPL-MCNC: 9.8 MG/DL
CHLORIDE SERPL-SCNC: 103 MMOL/L
CHOLEST SERPL-MCNC: 213 MG/DL
CO2 SERPL-SCNC: 18 MMOL/L
CREAT SERPL-MCNC: 2.72 MG/DL
EGFRCR SERPLBLD CKD-EPI 2021: 24 ML/MIN/1.73M2
ESTIMATED AVERAGE GLUCOSE: 111 MG/DL
GLUCOSE SERPL-MCNC: 103 MG/DL
HBA1C MFR BLD HPLC: 5.5 %
HCT VFR BLD CALC: 41.4 %
HDLC SERPL-MCNC: 47 MG/DL
HGB BLD-MCNC: 13.1 G/DL
LDLC SERPL-MCNC: 150 MG/DL
MCHC RBC-ENTMCNC: 23.5 PG
MCHC RBC-ENTMCNC: 31.6 G/DL
MCV RBC AUTO: 74.2 FL
NONHDLC SERPL-MCNC: 166 MG/DL
PLATELET # BLD AUTO: 226 K/UL
POTASSIUM SERPL-SCNC: 4.5 MMOL/L
PROT SERPL-MCNC: 7.7 G/DL
RBC # BLD: 5.58 M/UL
RBC # FLD: 18.4 %
SODIUM SERPL-SCNC: 143 MMOL/L
TRIGL SERPL-MCNC: 87 MG/DL
TSH SERPL-ACNC: 3.66 UIU/ML
WBC # FLD AUTO: 7.15 K/UL

## 2025-07-01 PROBLEM — E78.5 HYPERLIPIDEMIA: Status: ACTIVE | Noted: 2025-07-01

## 2025-07-01 RX ORDER — ROSUVASTATIN CALCIUM 40 MG/1
40 TABLET, FILM COATED ORAL
Qty: 90 | Refills: 3 | Status: ACTIVE | COMMUNITY
Start: 2025-07-01

## 2025-07-10 ENCOUNTER — NON-APPOINTMENT (OUTPATIENT)
Age: 75
End: 2025-07-10

## 2025-07-17 NOTE — ED ADULT TRIAGE NOTE - LOCATION:
Anthony Coelho (:  1972) is a 53 y.o. male, Established patient, here for evaluation of the following chief complaint(s):  Cyst and Follow-up (Patient presents today for f/u after I&D of cyst. He has returned for repacking of this. No new concerns. )     Assessment & Plan  1. Post-procedural follow-up: Acute.  - The iodoform was removed today, revealing significant drainage. The wound was repacked with iodoform. Discussion about the possibility of the patient's wife removing the packing tomorrow; however, due to her own health issues, it was decided that another clinician will remove it.  - Scheduled for a follow-up visit tomorrow at 10:20 AM for further evaluation and potential repacking.    2. Type 2 diabetes mellitus: Stable.  - Blood sugar was 183 this morning, attributed to overconsumption of carbohydrates the previous day. Monitoring of blood sugar levels discussed. Encouraged to maintain dietary control to manage blood sugar levels effectively.    Follow-up  - Scheduled for a follow-up visit tomorrow at 10:20 AM for further evaluation and potential repacking.    PROCEDURE    Procedure: Iodoform removal from infected sebaceous cyst    All questions were answered and agreement to proceed was given after the following Pre-Procedure details were reviewed:  - Consent: Verbal consent obtained    Intra-Procedure:  - Dressing: Removed iodoform, purulent drainage extracted, repacked with 1/4 inch iodoform gauze    Post-Procedure:  - Tolerance Level: Patient tolerated the procedure well  - Home Care Instructions: Patient advised to return tomorrow for further evaluation and potential repacking    Results  Labs   - Blood glucose test: 183 mg/dL        ICD-10-CM    1. Infected sebaceous cyst  L72.3 1/4 inch iodoform removed. Purulent drainage extracted. 1/4 inch iodoform replaced.    Continue Cleocin 300 mg TID    L08.9         Return in about 1 day (around 2025), or if symptoms worsen or fail to improve.    Left arm;

## 2025-07-22 ENCOUNTER — TRANSCRIPTION ENCOUNTER (OUTPATIENT)
Age: 75
End: 2025-07-22

## 2025-08-22 ENCOUNTER — APPOINTMENT (OUTPATIENT)
Dept: CARDIOLOGY | Facility: CLINIC | Age: 75
End: 2025-08-22

## 2025-09-12 ENCOUNTER — APPOINTMENT (OUTPATIENT)
Dept: CARDIOLOGY | Facility: CLINIC | Age: 75
End: 2025-09-12
Payer: MEDICARE

## 2025-09-12 VITALS
OXYGEN SATURATION: 97 % | BODY MASS INDEX: 31.92 KG/M2 | DIASTOLIC BLOOD PRESSURE: 81 MMHG | HEIGHT: 64 IN | WEIGHT: 187 LBS | HEART RATE: 80 BPM | SYSTOLIC BLOOD PRESSURE: 139 MMHG

## 2025-09-12 VITALS — DIASTOLIC BLOOD PRESSURE: 76 MMHG | SYSTOLIC BLOOD PRESSURE: 126 MMHG

## 2025-09-12 DIAGNOSIS — I50.30 UNSPECIFIED DIASTOLIC (CONGESTIVE) HEART FAILURE: ICD-10-CM

## 2025-09-12 DIAGNOSIS — I48.91 UNSPECIFIED ATRIAL FIBRILLATION: ICD-10-CM

## 2025-09-12 PROCEDURE — G2211 COMPLEX E/M VISIT ADD ON: CPT

## 2025-09-12 PROCEDURE — 99205 OFFICE O/P NEW HI 60 MIN: CPT

## 2025-09-12 PROCEDURE — 93000 ELECTROCARDIOGRAM COMPLETE: CPT

## 2025-09-12 RX ORDER — APIXABAN 5 MG/1
5 TABLET, FILM COATED ORAL
Qty: 180 | Refills: 3 | Status: ACTIVE | COMMUNITY
Start: 2025-09-12 | End: 1900-01-01